# Patient Record
Sex: FEMALE | Race: WHITE | NOT HISPANIC OR LATINO | Employment: FULL TIME | URBAN - METROPOLITAN AREA
[De-identification: names, ages, dates, MRNs, and addresses within clinical notes are randomized per-mention and may not be internally consistent; named-entity substitution may affect disease eponyms.]

---

## 2017-03-21 ENCOUNTER — OFFICE VISIT (OUTPATIENT)
Dept: FAMILY MEDICINE | Facility: CLINIC | Age: 22
End: 2017-03-21
Payer: COMMERCIAL

## 2017-03-21 VITALS
TEMPERATURE: 97.5 F | SYSTOLIC BLOOD PRESSURE: 111 MMHG | WEIGHT: 135.2 LBS | OXYGEN SATURATION: 98 % | DIASTOLIC BLOOD PRESSURE: 72 MMHG | HEIGHT: 67 IN | HEART RATE: 85 BPM | BODY MASS INDEX: 21.22 KG/M2

## 2017-03-21 DIAGNOSIS — Z20.2 EXPOSURE TO CHLAMYDIA: ICD-10-CM

## 2017-03-21 DIAGNOSIS — Z11.3 SCREEN FOR STD (SEXUALLY TRANSMITTED DISEASE): Primary | ICD-10-CM

## 2017-03-21 LAB — HIV 1+2 AB+HIV1 P24 AG SERPL QL IA: NORMAL

## 2017-03-21 PROCEDURE — 99213 OFFICE O/P EST LOW 20 MIN: CPT | Performed by: PHYSICIAN ASSISTANT

## 2017-03-21 PROCEDURE — 87591 N.GONORRHOEAE DNA AMP PROB: CPT | Performed by: PHYSICIAN ASSISTANT

## 2017-03-21 PROCEDURE — 86780 TREPONEMA PALLIDUM: CPT | Performed by: PHYSICIAN ASSISTANT

## 2017-03-21 PROCEDURE — 36415 COLL VENOUS BLD VENIPUNCTURE: CPT | Performed by: PHYSICIAN ASSISTANT

## 2017-03-21 PROCEDURE — 87389 HIV-1 AG W/HIV-1&-2 AB AG IA: CPT | Performed by: PHYSICIAN ASSISTANT

## 2017-03-21 PROCEDURE — 87491 CHLMYD TRACH DNA AMP PROBE: CPT | Performed by: PHYSICIAN ASSISTANT

## 2017-03-21 NOTE — MR AVS SNAPSHOT
"              After Visit Summary   3/21/2017    Ary Lutz    MRN: [id number]           Patient Information     Date Of Birth          1995        Visit Information        Provider Department      3/21/2017 7:20 AM Yanick Baca PA-C [facility] [place]        Today's Diagnoses     Screen for STD (sexually transmitted disease)    -  1    Chlamydia contact           Follow-ups after your visit        Who to contact     If you have questions or need follow up information about today's clinic visit or your schedule please contact [facility] directly at [phone].  Normal or non-critical lab and imaging results will be communicated to you by [place], letter or phone within 4 business days after the clinic has received the results. If you do not hear from us within 7 days, please contact the clinic through [place] or phone. If you have a critical or abnormal lab result, we will notify you by phone as soon as possible.  Submit refill requests through So1 or call your pharmacy and they will forward the refill request to us. Please allow 3 business days for your refill to be completed.          Additional Information About Your Visit        MyChart Information     So1 lets you send messages to your doctor, view your test results, renew your prescriptions, schedule appointments and more. To sign up, go to [email or web address] . Click on \"Log in\" on the left side of the screen, which will take you to the Welcome page. Then click on \"Sign up Now\" on the right side of the page.     You will be asked to enter the access code listed below, as well as some personal information. Please follow the directions to create your username and password.     Your access code is: [email or web address]  Expires: 2017  7:49 AM     Your access code will  in 90 days. If you need help or a new code, please call your [facility] or [phone].        Care EveryWhere ID     " "This is your Care EveryWhere ID. This could be used by other organizations to access your [facility] medical records  [id number]        Your Vitals Were     Pulse Temperature Height Last Period Pulse Oximetry BMI (Body Mass Index)    85 97.5  F (36.4  C) (Tympanic) 5' 7\" (1.702 m) 03/21/2017 98% 21.18 kg/m2       Blood Pressure from Last 3 Encounters:   03/21/17 111/72   12/22/16 (!) 86/60    Weight from Last 3 Encounters:   03/21/17 135 lb 3.2 oz (61.3 kg)   12/22/16 124 lb (56.2 kg)              We Performed the Following     Anti Treponema     Chlamydia trachomatis PCR     HIV Antigen Antibody Combo     Neisseria gonorrhoeae PCR        Primary Care Provider    None Specified       No primary provider on file.        Thank you!     [facility]. Our goal is always to provide you with excellent care. Hearing back from our patients is one way we can continue to improve our services. Please take a few minutes to complete the written survey that you may receive in the mail after your visit with us. Thank you!             Your Updated Medication List - Protect others around you: Learn how to safely use, store and throw away your medicines at www.disposemymeds.org.          This list is accurate as of: 3/21/17  7:49 AM.  Always use your most recent med list.                   Brand Name Dispense Instructions for use    drospirenone-ethinyl estradiol 3-0.03 MG per tablet    BRANDON     1 tablet daily       FLUoxetine 40 MG capsule    PROzac     Take 1 capsule (40 mg) by mouth daily       naproxen 500 MG tablet    NAPROSYN    30 tablet    Take 1 tablet (500 mg) by mouth 2 times daily as needed for moderate pain       pantoprazole 40 MG EC tablet    PROTONIX     TAKE 1 TABLET BY MOUTH EVERY MORNING BEFORE BREAKFAST.         "

## 2017-03-21 NOTE — NURSING NOTE
"Chief Complaint   Patient presents with     STD       Initial There were no vitals taken for this visit. Estimated body mass index is 19.42 kg/(m^2) as calculated from the following:    Height as of 12/22/16: 5' 7\" (1.702 m).    Weight as of 12/22/16: 124 lb (56.2 kg).  Medication Reconciliation: complete   Albania Bedolla CMA    "

## 2017-03-21 NOTE — PROGRESS NOTES
SUBJECTIVE:                                                    Ary Lutz is a 21 year old female who presents to clinic today for the following health issues:      STD check   Patient was notified by partner 1 month ago that he tested positive for chlamydia.  [facility].  She is here today to be retested to ensure that chlamydia has been cured ad would like other STD testing.  She is asymptomatic at this time. No SA since last exposure.+ condom use          Problem list and histories reviewed & adjusted, as indicated.  Additional history: as documented    Patient Active Problem List   Diagnosis     SCOTT (generalized anxiety disorder)     History reviewed. No pertinent past surgical history.    Social History   Substance Use Topics     Smoking status: Never Smoker     Smokeless tobacco: Never Used     Alcohol use No     History reviewed. No pertinent family history.      Current Outpatient Prescriptions   Medication Sig Dispense Refill     drospirenone-ethinyl estradiol (BRANDON) 3-0.03 MG per tablet 1 tablet daily  3     pantoprazole (PROTONIX) 40 MG EC tablet TAKE 1 TABLET BY MOUTH EVERY MORNING BEFORE BREAKFAST.  0     FLUoxetine (PROZAC) 40 MG capsule Take 1 capsule (40 mg) by mouth daily       naproxen (NAPROSYN) 500 MG tablet Take 1 tablet (500 mg) by mouth 2 times daily as needed for moderate pain (Patient not taking: Reported on 3/21/2017) 30 tablet 1     Allergies   Allergen Reactions     Bactrim [Sulfamethoxazole W/Trimethoprim] Rash       Reviewed and updated as needed this visit by clinical staff       Reviewed and updated as needed this visit by Provider         ROS:  Constitutional, HEENT, cardiovascular, pulmonary, gi and gu systems are negative, except as otherwise noted.    OBJECTIVE:                                                    /72 (BP Location: Right arm, Patient Position: Chair, Cuff Size:  "Adult Regular)  Pulse 85  Temp 97.5  F (36.4  C) (Tympanic)  Ht 5' 7\" (1.702 m)  Wt 135 lb 3.2 oz (61.3 kg)  LMP 03/21/2017  SpO2 98%  BMI 21.18 kg/m2  Body mass index is 21.18 kg/(m^2).  GENERAL: healthy, alert and no distress  RESP: lungs clear to auscultation - no rales, rhonchi or wheezes  CV: regular rate and rhythm, normal S1 S2, no S3 or S4, no murmur  ABDOMEN: soft, nontender, no hepatosplenomegaly, no masses and bowel sounds normal    Diagnostic Test Results:  none      ASSESSMENT/PLAN:                                                      1. Exposure to chlamydia  Already treated.  Will retest today and treat accordingly if positive.  She is agreeable to this plan of care    2. Screen for STD (sexually transmitted disease)  - Neisseria gonorrhoeae PCR  - Chlamydia trachomatis PCR  - HIV Antigen Antibody Combo  - Anti Treponema    See Patient Instructions    Yanick Baca PA-C  [facility]  "

## 2017-03-22 LAB
N GONORRHOEA DNA SPEC QL NAA+PROBE: NORMAL
SPECIMEN SOURCE: NORMAL
T PALLIDUM IGG+IGM SER QL: NEGATIVE

## 2017-03-23 LAB
C TRACH DNA SPEC QL NAA+PROBE: ABNORMAL
SPECIMEN SOURCE: ABNORMAL

## 2017-03-24 ENCOUNTER — TELEPHONE (OUTPATIENT)
Dept: FAMILY MEDICINE | Facility: CLINIC | Age: 22
End: 2017-03-24

## 2017-03-24 DIAGNOSIS — A74.9 CHLAMYDIA INFECTION: Primary | ICD-10-CM

## 2017-03-24 RX ORDER — DOXYCYCLINE 100 MG/1
100 CAPSULE ORAL 2 TIMES DAILY
Qty: 14 CAPSULE | Refills: 0 | Status: SHIPPED | OUTPATIENT
Start: 2017-03-24 | End: 2017-03-31

## 2017-03-24 NOTE — TELEPHONE ENCOUNTER
Attempted to call patient regarding her results showing positive chlamydia test. She was treated 1 month ago for this, so I believe that we should  try a different antibiotic. I have called in doxycycline BID x 7 days to the pharmacy.  Please call patient and inform her.  Let me know if she has questions.  Please begin [facility] paperwork.  She should follow up for retesting 3 weeks after last dose of meds.  She should also inform sex partners

## 2017-03-24 NOTE — TELEPHONE ENCOUNTER
Patient called back and was given result below per Yanick Baca PA-C. Patient agrees to  doxycyline. Paperwork for [facility] completed and faxed. Shae Johnston RN

## 2017-04-14 ENCOUNTER — OFFICE VISIT (OUTPATIENT)
Dept: FAMILY MEDICINE | Facility: CLINIC | Age: 22
End: 2017-04-14
Payer: COMMERCIAL

## 2017-04-14 VITALS
BODY MASS INDEX: 20.25 KG/M2 | WEIGHT: 129 LBS | SYSTOLIC BLOOD PRESSURE: 100 MMHG | RESPIRATION RATE: 16 BRPM | TEMPERATURE: 98.6 F | HEIGHT: 67 IN | OXYGEN SATURATION: 99 % | HEART RATE: 74 BPM | DIASTOLIC BLOOD PRESSURE: 60 MMHG

## 2017-04-14 DIAGNOSIS — Z86.19 HX OF CHLAMYDIA INFECTION: ICD-10-CM

## 2017-04-14 DIAGNOSIS — K21.9 GASTROESOPHAGEAL REFLUX DISEASE WITHOUT ESOPHAGITIS: Primary | ICD-10-CM

## 2017-04-14 PROCEDURE — 99213 OFFICE O/P EST LOW 20 MIN: CPT | Performed by: PHYSICIAN ASSISTANT

## 2017-04-14 PROCEDURE — 87491 CHLMYD TRACH DNA AMP PROBE: CPT | Performed by: PHYSICIAN ASSISTANT

## 2017-04-14 NOTE — MR AVS SNAPSHOT
"              After Visit Summary   4/14/2017    Ary Lutz    MRN: [id number]           Patient Information     Date Of Birth          1995        Visit Information        Provider Department      4/14/2017 7:00 AM Yanick Baca PA-C [facility] [place]        Today's Diagnoses     Gastroesophageal reflux disease without esophagitis    -  1    Hx of chlamydia infection           Follow-ups after your visit        Future tests that were ordered for you today     Open Future Orders        Priority Expected Expires Ordered    H Pylori antigen, stool Routine  5/14/2017 4/14/2017            Who to contact     If you have questions or need follow up information about today's clinic visit or your schedule please contact [facility] directly at [phone].  Normal or non-critical lab and imaging results will be communicated to you by MyChart, letter or phone within 4 business days after the clinic has received the results. If you do not hear from us within 7 days, please contact the clinic through eSiliconhart or phone. If you have a critical or abnormal lab result, we will notify you by phone as soon as possible.  Submit refill requests through X2TV or call your pharmacy and they will forward the refill request to us. Please allow 3 business days for your refill to be completed.          Additional Information About Your Visit        MyChart Information     X2TV lets you send messages to your doctor, view your test results, renew your prescriptions, schedule appointments and more. To sign up, go to [email or web address] . Click on \"Log in\" on the left side of the screen, which will take you to the Welcome page. Then click on \"Sign up Now\" on the right side of the page.     You will be asked to enter the access code listed below, as well as some personal information. Please follow the directions to create your username and password.     Your access code is: " "[email or web address]  Expires: 2017  7:49 AM     Your access code will  in 90 days. If you need help or a new code, please call your [facility] clinic or [phone].        Care EveryWhere ID     This is your Care EveryWhere ID. This could be used by other organizations to access your [facility] medical records  [id number]        Your Vitals Were     Pulse Temperature Respirations Height Last Period Pulse Oximetry    74 98.6  F (37  C) 16 5' 7\" (1.702 m) 2017 99%    BMI (Body Mass Index)                   20.2 kg/m2            Blood Pressure from Last 3 Encounters:   17 100/60   17 111/72   16 (!) 86/60    Weight from Last 3 Encounters:   17 129 lb (58.5 kg)   17 135 lb 3.2 oz (61.3 kg)   16 124 lb (56.2 kg)              We Performed the Following     Chlamydia trachomatis PCR        Primary Care Provider Office Phone # Fax #    Yanick Baca PA-C [phone]       [facility] [place] [address] [place] [address] [address]  [place] [address]        Thank you!     Thank you for choosing [facility]  for your care. Our goal is always to provide you with excellent care. Hearing back from our patients is one way we can continue to improve our services. Please take a few minutes to complete the written survey that you may receive in the mail after your visit with us. Thank you!             Your Updated Medication List - Protect others around you: Learn how to safely use, store and throw away your medicines at www.disposemymeds.org.          This list is accurate as of: 17  7:54 AM.  Always use your most recent med list.                   Brand Name Dispense Instructions for use    drospirenone-ethinyl estradiol 3-0.03 MG per tablet    BRANDON     1 tablet daily       FLUoxetine 40 MG capsule    PROzac     Take 1 capsule (40 mg) by mouth daily       pantoprazole 40 MG EC tablet    PROTONIX     TAKE 1 TABLET BY MOUTH EVERY MORNING BEFORE " BREAKFAST.

## 2017-04-14 NOTE — LETTER
[facility]          [address] [place] [address]                            [phone]  Fax: [phone]  April 18, 2017     Ary Lutz  [address]   [facility] [place] [address]      Dear Ary,    The results of your recent tests were reviewed and are as follows:    Your repeat chlamydia test is negative at this time.  No further treatment is needed      Enclosed is a copy of the results.     Results for orders placed or performed in visit on 04/14/17   Chlamydia trachomatis PCR   Result Value Ref Range    Specimen Description Urine     Chlamydia Trachomatis PCR  NEG     Negative   Negative for C. trachomatis rRNA by transcription mediated amplification.   A negative result by transcription mediated amplification does not preclude the   presence of C. trachomatis infection because results are dependent on proper   and adequate collection, absence of inhibitors, and sufficient rRNA to be   detected.         Thank you for choosing [facility].  We appreciate the opportunity to serve you and look forward to supporting your healthcare needs in the future.    If you have any questions or concerns, please call me or my staff at [phone].      Sincerely,      Yanick Baca PA-C

## 2017-04-14 NOTE — PROGRESS NOTES
"Chief Complaint   Patient presents with     Abdominal Pain       Initial /60  Pulse 74  Temp 98.6  F (37  C)  Resp 16  Ht 5' 7\" (1.702 m)  Wt 129 lb (58.5 kg)  LMP 03/21/2017  SpO2 99%  BMI 20.2 kg/m2 Estimated body mass index is 20.2 kg/(m^2) as calculated from the following:    Height as of this encounter: 5' 7\" (1.702 m).    Weight as of this encounter: 129 lb (58.5 kg).  Medication Reconciliation: complete. SHAUNA Duran LPN        SUBJECTIVE:                                                    Ary Lutz is a 21 year old female who presents to clinic today for the following health issues:      ABDOMINAL PAIN     Onset: 3 weeks    Description:   Character: Cramping  Location: middle - changes  Radiation: None    Intensity: 7/10    Progression of Symptoms:  worsening    Accompanying Signs & Symptoms:  Fever/Chills?: no   Gas/Bloating: YES  Nausea: YES- sometimes  Vomitting: no   Diarrhea?: YES  Constipation:no   Dysuria or Hematuria: no    History:   Trauma: no   Previous similar pain: no    Previous tests done: none    Precipitating factors:   Does the pain change with:     Food: YES- better for a short while     BM: no     Urination: no     Alleviating factors:  none    Therapies Tried and outcome: none    LMP:  3/20/17           Ary has a hx of GERD for which she takes Protonix 40 mg daily.   She had an endoscopy in 2014 which showed H pylori and she was treated.  Over the past 3 weeks she has been having nausea and an \"empty gnawing\" feeling her her epigastrium that is constant and is worse with eating.  She notes occasional loose stools as well.  No melena, or hematochezia.    Patient also was treated for chlamydia 3 weeks ago, she is asymptomatic and would like to be tested for cure at this time.    Problem list and histories reviewed & adjusted, as indicated.  Additional history: as documented    Patient Active Problem List   Diagnosis     SCOTT (generalized anxiety disorder)     No " "past surgical history on file.    Social History   Substance Use Topics     Smoking status: Never Smoker     Smokeless tobacco: Never Used     Alcohol use No     Family History   Problem Relation Age of Onset     Thyroid Cancer Mother      Familial Adenomatous Polyposis (FAP) Father          Current Outpatient Prescriptions   Medication Sig Dispense Refill     drospirenone-ethinyl estradiol (BRANDON) 3-0.03 MG per tablet 1 tablet daily  3     pantoprazole (PROTONIX) 40 MG EC tablet TAKE 1 TABLET BY MOUTH EVERY MORNING BEFORE BREAKFAST.  0     FLUoxetine (PROZAC) 40 MG capsule Take 1 capsule (40 mg) by mouth daily       Allergies   Allergen Reactions     Bactrim [Sulfamethoxazole W/Trimethoprim] Rash       Reviewed and updated as needed this visit by clinical staff  Tobacco  Allergies  Meds  Fam Hx  Soc Hx      Reviewed and updated as needed this visit by Provider         ROS:  Constitutional, HEENT, cardiovascular, pulmonary, gi and gu systems are negative, except as otherwise noted.    OBJECTIVE:                                                    /60  Pulse 74  Temp 98.6  F (37  C)  Resp 16  Ht 5' 7\" (1.702 m)  Wt 129 lb (58.5 kg)  LMP 03/21/2017  SpO2 99%  BMI 20.2 kg/m2  Body mass index is 20.2 kg/(m^2).  GENERAL: healthy, alert and no distress  RESP: lungs clear to auscultation - no rales, rhonchi or wheezes  CV: regular rate and rhythm, normal S1 S2, no S3 or S4, no murmur  ABDOMEN: soft, generalized epigastric tenderness, no rebound or guarding, no hepatosplenomegaly, no masses and bowel sounds normal  PSYCH: mentation appears normal, affect normal/bright    Diagnostic Test Results:  none      ASSESSMENT/PLAN:                                                      1. Gastroesophageal reflux disease without esophagitis  Advise that patient start zantac daily with the use of Protonix x 2-3 weeks. And we will check for H pylori infection at this time.  If no improvement in 3 weeks will send to GI " or endoscopy for further assessment  - H Pylori antigen, stool; Future    2. Hx of chlamydia infection  - Chlamydia trachomatis PCR    Follow-Up:As above    Yanick Baca PA-C  [facility] [place]

## 2017-04-18 LAB
C TRACH DNA SPEC QL NAA+PROBE: NORMAL
SPECIMEN SOURCE: NORMAL

## 2017-07-31 ENCOUNTER — OFFICE VISIT (OUTPATIENT)
Dept: FAMILY MEDICINE | Facility: CLINIC | Age: 22
End: 2017-07-31
Payer: COMMERCIAL

## 2017-07-31 VITALS
HEIGHT: 67 IN | TEMPERATURE: 96.7 F | HEART RATE: 83 BPM | BODY MASS INDEX: 20.81 KG/M2 | SYSTOLIC BLOOD PRESSURE: 104 MMHG | OXYGEN SATURATION: 100 % | RESPIRATION RATE: 16 BRPM | DIASTOLIC BLOOD PRESSURE: 72 MMHG | WEIGHT: 132.6 LBS

## 2017-07-31 DIAGNOSIS — J01.90 ACUTE SINUSITIS WITH SYMPTOMS > 10 DAYS: ICD-10-CM

## 2017-07-31 DIAGNOSIS — R35.0 URINARY FREQUENCY: ICD-10-CM

## 2017-07-31 DIAGNOSIS — K21.9 GASTROESOPHAGEAL REFLUX DISEASE WITHOUT ESOPHAGITIS: Primary | ICD-10-CM

## 2017-07-31 LAB
ALBUMIN UR-MCNC: NEGATIVE MG/DL
APPEARANCE UR: CLEAR
BACTERIA #/AREA URNS HPF: ABNORMAL /HPF
BILIRUB UR QL STRIP: NEGATIVE
COLOR UR AUTO: YELLOW
GLUCOSE UR STRIP-MCNC: NEGATIVE MG/DL
HGB UR QL STRIP: ABNORMAL
KETONES UR STRIP-MCNC: NEGATIVE MG/DL
LEUKOCYTE ESTERASE UR QL STRIP: NEGATIVE
MUCOUS THREADS #/AREA URNS LPF: PRESENT /LPF
NITRATE UR QL: NEGATIVE
NON-SQ EPI CELLS #/AREA URNS LPF: ABNORMAL /LPF
PH UR STRIP: 6 PH (ref 5–7)
RBC #/AREA URNS AUTO: ABNORMAL /HPF (ref 0–2)
SP GR UR STRIP: <=1.005 (ref 1–1.03)
URN SPEC COLLECT METH UR: ABNORMAL
UROBILINOGEN UR STRIP-ACNC: 0.2 EU/DL (ref 0.2–1)
WBC #/AREA URNS AUTO: ABNORMAL /HPF (ref 0–2)

## 2017-07-31 PROCEDURE — 99214 OFFICE O/P EST MOD 30 MIN: CPT | Performed by: PHYSICIAN ASSISTANT

## 2017-07-31 PROCEDURE — 81001 URINALYSIS AUTO W/SCOPE: CPT | Performed by: PHYSICIAN ASSISTANT

## 2017-07-31 PROCEDURE — 87086 URINE CULTURE/COLONY COUNT: CPT | Performed by: PHYSICIAN ASSISTANT

## 2017-07-31 NOTE — PROGRESS NOTES
SUBJECTIVE:                                                    Ary Lutz is a 22 year old female who presents to clinic today for the following health issues:      Acute Illness   Acute illness concerns: Sinus concerns  Onset: 4 months    Fever: YES- sometimes, none recently    Chills/Sweats: YES    Headache (location?): YES    Sinus Pressure:YES, R>L    Conjunctivitis:  no    Ear Pain: YES- both    Rhinorrhea: YES    Congestion: YES  Sore Throat: YES- scatchy   Cough: YES    Wheeze: no     Decreased Appetite: no     Nausea: YES    Vomiting: no     Diarrhea:  YES    Dysuria/Freq.: YES- frequent    Fatigue/Achiness: YES- both    Sick/Strep Exposure: no      Therapies Tried and outcome: OTC medication decongestant - helps a little.    Sinus pressure:  4 month hx of facial pressure, dental pain and headaches R>L.  She notes associated nasal congestion, and sore throat, some sneezing and itchy watery eyes.  She was using afrin  Which did not help.  Has not tried any other medications.      GERD; See last note, using Protonix 40 mg without improvement, GERD seems to be worsening despite this.  Has tried omeprazole and zantac without improvement     Urinary frequency:  X 2-3 weeks, no urgency, dysuria or hematuria.  No n/v/d.        Problem list and histories reviewed & adjusted, as indicated.  Additional history: as documented    Patient Active Problem List   Diagnosis     SCOTT (generalized anxiety disorder)     No past surgical history on file.    Social History   Substance Use Topics     Smoking status: Never Smoker     Smokeless tobacco: Never Used     Alcohol use No     Family History   Problem Relation Age of Onset     Thyroid Cancer Mother      Familial Adenomatous Polyposis (FAP) Father          Current Outpatient Prescriptions   Medication Sig Dispense Refill     amoxicillin-clavulanate (AUGMENTIN) 875-125 MG per tablet Take 1 tablet by mouth 2 times daily 20 tablet 0     drospirenone-ethinyl estradiol  "(BRANDON) 3-0.03 MG per tablet 1 tablet daily  3     pantoprazole (PROTONIX) 40 MG EC tablet TAKE 1 TABLET BY MOUTH EVERY MORNING BEFORE BREAKFAST.  0     FLUoxetine (PROZAC) 40 MG capsule Take 1 capsule (40 mg) by mouth daily       Allergies   Allergen Reactions     Bactrim [Sulfamethoxazole W/Trimethoprim] Rash         Reviewed and updated as needed this visit by clinical staff     Reviewed and updated as needed this visit by Provider         ROS:  Constitutional, HEENT, cardiovascular, pulmonary, GI, , musculoskeletal, neuro, skin, endocrine and psych systems are negative, except as otherwise noted.      OBJECTIVE:   /72 (BP Location: Left arm, Patient Position: Chair, Cuff Size: Adult Regular)  Pulse 83  Temp 96.7  F (35.9  C) (Tympanic)  Resp 16  Ht 5' 7\" (1.702 m)  Wt 132 lb 9.6 oz (60.1 kg)  LMP 07/04/2017 (Approximate)  SpO2 100%  BMI 20.77 kg/m2  Body mass index is 20.77 kg/(m^2).  GENERAL: healthy, alert and no distress  RESP: lungs clear to auscultation - no rales, rhonchi or wheezes  HENT:  TTP along right maxillary and frontal sinus, nasal turbinates swollen bilaterally, tonsils without exudates  CV: regular rate and rhythm, normal S1 S2, no S3 or S4, no murmur, click or rub  ABDOMEN: soft, nontender, no hepatosplenomegaly, no masses and bowel sounds normal  PSYCH: mentation appears normal, affect normal/bright    Diagnostic Test Results:  none     ASSESSMENT/PLAN:       1. Gastroesophageal reflux disease without esophagitis  Will test for h pylori, if negative, consider EGD.  - H Pylori antigen, stool; Future  - Urine Microscopic    2. Acute sinusitis with symptoms > 10 days  Symptoms and exam consistent with sinusitis. Will try round of antibiotics and flonase over the next 2 weeks, if no improvement, may consider steroid burst  - amoxicillin-clavulanate (AUGMENTIN) 875-125 MG per tablet; Take 1 tablet by mouth 2 times daily  Dispense: 20 tablet; Refill: 0    3. Urinary frequency  Will " await culture for treatment  - UA reflex to Microscopic and Culture  - Urine Culture Aerobic Bacterial    See Patient Instructions    Yanick Baca PA-C  [facility] [place]

## 2017-07-31 NOTE — MR AVS SNAPSHOT
After Visit Summary   7/31/2017    Ary Lutz    MRN: [id number]           Patient Information     Date Of Birth          1995        Visit Information        Provider Department      7/31/2017 7:00 AM Yanick Baca PA-C [facility] [place]        Today's Diagnoses     Gastroesophageal reflux disease without esophagitis    -  1    Acute sinusitis with symptoms > 10 days        Urinary frequency           Follow-ups after your visit        Future tests that were ordered for you today     Open Future Orders        Priority Expected Expires Ordered    H Pylori antigen, stool Routine  8/30/2017 7/31/2017            Who to contact     If you have questions or need follow up information about today's clinic visit or your schedule please [facility] directly at [phone].  Normal or non-critical lab and imaging results will be communicated to you by MyChart, letter or phone within 4 business days after the clinic has received the results. If you do not hear from us within 7 days, please contact the clinic through MyChart or phone. If you have a critical or abnormal lab result, we will notify you by phone as soon as possible.  Submit refill requests through [employer] or call your pharmacy and they will forward the refill request to us. Please allow 3 business days for your refill to be completed.          Additional Information About Your Visit        MyChart Information     [employer] gives you secure access to your electronic health record. If you see a primary care provider, you can also send messages to your care team and make appointments. If you have questions, please call your primary care clinic.  If you do not have a primary care provider, please call [phone] and they will assist you.        Care EveryWhere ID     This is your Care EveryWhere ID. This could be used by other organizations to access your [facility]  "records  [id number]        Your Vitals Were     Pulse Temperature Respirations Height Last Period Pulse Oximetry    83 96.7  F (35.9  C) (Tympanic) 16 5' 7\" (1.702 m) 07/04/2017 (Approximate) 100%    BMI (Body Mass Index)                   20.77 kg/m2            Blood Pressure from Last 3 Encounters:   07/31/17 104/72   04/14/17 100/60   03/21/17 111/72    Weight from Last 3 Encounters:   07/31/17 132 lb 9.6 oz (60.1 kg)   04/14/17 129 lb (58.5 kg)   03/21/17 135 lb 3.2 oz (61.3 kg)              We Performed the Following     UA reflex to Microscopic and Culture     Urine Culture Aerobic Bacterial          Today's Medication Changes          These changes are accurate as of: 7/31/17  7:26 AM.  If you have any questions, ask your nurse or doctor.               Start taking these medicines.        Dose/Directions    amoxicillin-clavulanate 875-125 MG per tablet   Commonly known as:  AUGMENTIN   Used for:  Acute sinusitis with symptoms > 10 days   Started by:  Yanick Baca PA-C        Dose:  1 tablet   Take 1 tablet by mouth 2 times daily   Quantity:  20 tablet   Refills:  0            Where to get your medicines      These medications were sent to [place] [address] [facility] [place] [address]  [address] [address]     Phone:  [phone]     amoxicillin-clavulanate 875-125 MG per tablet                Primary Care Provider Office Phone # Fax #    Yanick Baca PA-C [phone]       [facility] [address]  [place] [address]        Equal Access to Services     [facility] AH: Hadii gill jackson Socira, waaxda luqadaha, qaybta kaalmada adeegyada, hollis helms. So [facility] [phone].    ATENCIÓN: Si habla español, tiene a spangler disposición servicios gratuitos de asistencia lingüística. Llame al [phone].    We comply with applicable federal civil rights laws and [place] " laws. We do not discriminate on the basis of race, color, national origin, age, disability sex, sexual orientation or gender identity.            Thank you!     Thank you for choosing [facility] [place]  for your care. Our goal is always to provide you with excellent care. Hearing back from our patients is one way we can continue to improve our services. Please take a few minutes to complete the written survey that you may receive in the mail after your visit with us. Thank you!             Your Updated Medication List - Protect others around you: Learn how to safely use, store and throw away your medicines at www.disposemymeds.org.          This list is accurate as of: 7/31/17  7:26 AM.  Always use your most recent med list.                   Brand Name Dispense Instructions for use Diagnosis    amoxicillin-clavulanate 875-125 MG per tablet    AUGMENTIN    20 tablet    Take 1 tablet by mouth 2 times daily    Acute sinusitis with symptoms > 10 days       drospirenone-ethinyl estradiol 3-0.03 MG per tablet    BRANDON     1 tablet daily        FLUoxetine 40 MG capsule    PROzac     Take 1 capsule (40 mg) by mouth daily        pantoprazole 40 MG EC tablet    PROTONIX     TAKE 1 TABLET BY MOUTH EVERY MORNING BEFORE BREAKFAST.

## 2017-08-01 LAB
BACTERIA SPEC CULT: NORMAL
MICRO REPORT STATUS: NORMAL
SPECIMEN SOURCE: NORMAL

## 2017-08-01 NOTE — PROGRESS NOTES
Ary-  Here are your recent results.     Your urine culture grew out normal anushka and no antibiotic is needed at this time.      If you have any questions please do not hesitate to contact our office via phone ([phone]) or you may send me a message via OpenPortal by clicking the contact my Care Team link.      It was a pleasure participating in your care!    Thank you,    Yanick Baca MPH, PA-C  [address] [place] [address] [phone]

## 2017-08-02 DIAGNOSIS — K21.9 GASTROESOPHAGEAL REFLUX DISEASE WITHOUT ESOPHAGITIS: ICD-10-CM

## 2017-08-02 PROCEDURE — 87338 HPYLORI STOOL AG IA: CPT | Performed by: PHYSICIAN ASSISTANT

## 2017-08-03 ENCOUNTER — TELEPHONE (OUTPATIENT)
Dept: FAMILY MEDICINE | Facility: CLINIC | Age: 22
End: 2017-08-03

## 2017-08-03 DIAGNOSIS — K21.9 GASTROESOPHAGEAL REFLUX DISEASE WITHOUT ESOPHAGITIS: Primary | ICD-10-CM

## 2017-08-03 LAB
H PYLORI AG STL QL IA: NORMAL
MICRO REPORT STATUS: NORMAL
SPECIMEN SOURCE: NORMAL

## 2017-08-03 NOTE — TELEPHONE ENCOUNTER
Please call Ary and inform her that her h pylori test is negative .  Given her persistent GERD symptoms I would like her to have an endoscopy at this time.  This test has been ordered.  Please assist her with the GI information

## 2017-08-03 NOTE — TELEPHONE ENCOUNTER
Attempted to call patient, no answer or voicemail. Will try later.   Inga Flower RN   [facility] - Triage

## 2017-08-04 NOTE — TELEPHONE ENCOUNTER
Unable to leave message.  Mailbox full.  I mycharted this information to the patient  Faviola Lea RN- Triage [employer]

## 2017-09-06 DIAGNOSIS — F41.1 GAD (GENERALIZED ANXIETY DISORDER): ICD-10-CM

## 2017-09-06 NOTE — TELEPHONE ENCOUNTER
fluoxetine     Last Written Prescription Date: 8/9/17  Last Fill Quantity: 30, # refills: 0  Last Office Visit with [facility] primary care provider:  7/31/17        Last PHQ-9 score on record=   PHQ-9 SCORE 8/9/2017   Total Score MyChart 2 (Minimal depression)   Total Score 2

## 2017-09-07 ENCOUNTER — SURGERY (OUTPATIENT)
Age: 22
End: 2017-09-07

## 2017-09-07 ENCOUNTER — HOSPITAL ENCOUNTER (OUTPATIENT)
Facility: CLINIC | Age: 22
Discharge: HOME | End: 2017-09-07
Attending: INTERNAL MEDICINE | Admitting: INTERNAL MEDICINE
Payer: COMMERCIAL

## 2017-09-07 VITALS
BODY MASS INDEX: 20.4 KG/M2 | WEIGHT: 130 LBS | OXYGEN SATURATION: 95 % | SYSTOLIC BLOOD PRESSURE: 125 MMHG | DIASTOLIC BLOOD PRESSURE: 65 MMHG | RESPIRATION RATE: 14 BRPM | HEIGHT: 67 IN

## 2017-09-07 LAB — UPPER GI ENDOSCOPY: NORMAL

## 2017-09-07 PROCEDURE — 25000128 H RX IP 250 OP 636: Performed by: INTERNAL MEDICINE

## 2017-09-07 PROCEDURE — G0500 MOD SEDAT ENDO SERVICE >5YRS: HCPCS | Performed by: INTERNAL MEDICINE

## 2017-09-07 PROCEDURE — 88305 TISSUE EXAM BY PATHOLOGIST: CPT | Performed by: INTERNAL MEDICINE

## 2017-09-07 PROCEDURE — 43239 EGD BIOPSY SINGLE/MULTIPLE: CPT | Performed by: INTERNAL MEDICINE

## 2017-09-07 PROCEDURE — 88305 TISSUE EXAM BY PATHOLOGIST: CPT | Mod: 26 | Performed by: INTERNAL MEDICINE

## 2017-09-07 RX ORDER — FLUOXETINE 40 MG/1
CAPSULE ORAL
Qty: 90 CAPSULE | Refills: 0 | Status: SHIPPED | OUTPATIENT
Start: 2017-09-07 | End: 2017-12-06

## 2017-09-07 RX ORDER — FENTANYL CITRATE 50 UG/ML
INJECTION, SOLUTION INTRAMUSCULAR; INTRAVENOUS PRN
Status: DISCONTINUED | OUTPATIENT
Start: 2017-09-07 | End: 2017-09-07 | Stop reason: HOSPADM

## 2017-09-07 RX ORDER — LIDOCAINE 40 MG/G
CREAM TOPICAL
Status: DISCONTINUED | OUTPATIENT
Start: 2017-09-07 | End: 2017-09-07 | Stop reason: HOSPADM

## 2017-09-07 RX ORDER — ONDANSETRON 2 MG/ML
4 INJECTION INTRAMUSCULAR; INTRAVENOUS
Status: DISCONTINUED | OUTPATIENT
Start: 2017-09-07 | End: 2017-09-07 | Stop reason: HOSPADM

## 2017-09-07 RX ADMIN — FENTANYL CITRATE 100 MCG: 50 INJECTION, SOLUTION INTRAMUSCULAR; INTRAVENOUS at 09:58

## 2017-09-07 RX ADMIN — MIDAZOLAM HYDROCHLORIDE 2 MG: 1 INJECTION, SOLUTION INTRAMUSCULAR; INTRAVENOUS at 10:05

## 2017-09-07 RX ADMIN — MIDAZOLAM HYDROCHLORIDE 2 MG: 1 INJECTION, SOLUTION INTRAMUSCULAR; INTRAVENOUS at 10:01

## 2017-09-07 RX ADMIN — FENTANYL CITRATE 50 MCG: 50 INJECTION, SOLUTION INTRAMUSCULAR; INTRAVENOUS at 10:16

## 2017-09-07 RX ADMIN — MIDAZOLAM HYDROCHLORIDE 2 MG: 1 INJECTION, SOLUTION INTRAMUSCULAR; INTRAVENOUS at 09:57

## 2017-09-07 NOTE — TELEPHONE ENCOUNTER
Prescription approved per [facility], [facility] or [facility] refill protocol.  Lissa Carroll RN - Triage  [facility]

## 2017-09-08 LAB — COPATH REPORT: NORMAL

## 2017-09-12 PROBLEM — K21.9 ESOPHAGEAL REFLUX: Status: ACTIVE | Noted: 2017-09-12

## 2017-09-15 ENCOUNTER — TELEPHONE (OUTPATIENT)
Dept: FAMILY MEDICINE | Facility: CLINIC | Age: 22
End: 2017-09-15

## 2017-09-15 NOTE — TELEPHONE ENCOUNTER
Left non-detailed message to call back and ask to speak with a triage nurse at [phone].  Shae Johnston RN

## 2017-09-15 NOTE — TELEPHONE ENCOUNTER
Patient is confirmed    [facility] by the patient.  Routing to Triage to see if this requires a phone call      Georgia LEVY

## 2017-09-18 ENCOUNTER — OFFICE VISIT (OUTPATIENT)
Dept: FAMILY MEDICINE | Facility: CLINIC | Age: 22
End: 2017-09-18
Payer: COMMERCIAL

## 2017-09-18 VITALS
DIASTOLIC BLOOD PRESSURE: 60 MMHG | BODY MASS INDEX: 21.14 KG/M2 | OXYGEN SATURATION: 99 % | HEART RATE: 80 BPM | SYSTOLIC BLOOD PRESSURE: 98 MMHG | TEMPERATURE: 98.2 F | WEIGHT: 135 LBS

## 2017-09-18 DIAGNOSIS — K21.9 GASTROESOPHAGEAL REFLUX DISEASE WITHOUT ESOPHAGITIS: Primary | ICD-10-CM

## 2017-09-18 DIAGNOSIS — R01.1 UNDIAGNOSED CARDIAC MURMURS: ICD-10-CM

## 2017-09-18 PROCEDURE — 99213 OFFICE O/P EST LOW 20 MIN: CPT | Performed by: PHYSICIAN ASSISTANT

## 2017-09-18 NOTE — PROGRESS NOTES
SUBJECTIVE:   Ary Lutz is a 22 year old female who presents to clinic today for the following health issues:      GERD/Heartburn  Onset: Pt had an endoscopy and the H. Pylori came back negative, she is here to f/u     Ary continued to experience a pain and burning in her chest despite Protonix.  Recently underwent endoscopy which was normal. She is wondering about next steps.  Today, she notes that sh has started to have more left sided chest wall pain after eating , which is change in her usual symptoms.  She also notes that her pain is becoming more persistent.  It occurs mainly after eating.  Pain is not exertional.  She does associate the pain with some intermittent palpations and some SOB that she attributes to anxiety when she starts to feel sick.  No dizziness, + nausea.  She has recent normal EKG done at [facility] 07/22/2017, this was reviewed on care everywhere today. No hx of cardiac issues.        Problem list and histories reviewed & adjusted, as indicated.  Additional history: as documented    Patient Active Problem List   Diagnosis     SCOTT (generalized anxiety disorder)     Esophageal reflux     Past Surgical History:   Procedure Laterality Date     ESOPHAGOSCOPY, GASTROSCOPY, DUODENOSCOPY (EGD), COMBINED N/A 9/7/2017    Procedure: COMBINED ESOPHAGOSCOPY, GASTROSCOPY, DUODENOSCOPY (EGD), BIOPSY SINGLE OR MULTIPLE;  gastroscopy;  Surgeon: Hollis Prather MD;  Location:  GI       Social History   Substance Use Topics     Smoking status: Never Smoker     Smokeless tobacco: Never Used     Alcohol use No     Family History   Problem Relation Age of Onset     Thyroid Cancer Mother      Familial Adenomatous Polyposis (FAP) Father          Current Outpatient Prescriptions   Medication Sig Dispense Refill     FLUoxetine (PROZAC) 40 MG capsule TAKE 1 CAPSULE (40 MG) BY MOUTH DAILY 90 capsule 0     drospirenone-ethinyl estradiol (BRANDON) 3-0.03 MG per tablet 1 tablet daily  3      pantoprazole (PROTONIX) 40 MG EC tablet TAKE 1 TABLET BY MOUTH EVERY MORNING BEFORE BREAKFAST.  0     Allergies   Allergen Reactions     Bactrim [Sulfamethoxazole W/Trimethoprim] Rash         Reviewed and updated as needed this visit by clinical staff     Reviewed and updated as needed this visit by Provider         ROS:  Constitutional, HEENT, cardiovascular, pulmonary, GI, , musculoskeletal, neuro, skin, endocrine and psych systems are negative, except as otherwise noted.      OBJECTIVE:   BP 98/60 (BP Location: Left arm, Patient Position: Chair, Cuff Size: Adult Regular)  Pulse 80  Temp 98.2  F (36.8  C) (Tympanic)  Wt 135 lb (61.2 kg)  LMP 08/21/2017  SpO2 99%  BMI 21.14 kg/m2  Body mass index is 21.14 kg/(m^2).  GENERAL: healthy, alert and no distress  RESP: lungs clear to auscultation - no rales, rhonchi or wheezes  CV: regular rate and rhythm, 2/6 systolic murmur more prominent at LSB, no click or rub  PSYCH: mentation appears normal, affect normal/bright    Diagnostic Test Results:  none     ASSESSMENT/PLAN:     1. Gastroesophageal reflux disease without esophagitis  Endoscopy normal despite no improvement of symptoms with Protonix.  On exam today, I do note a systolic murmur that was not noted previously.  This is likely not new, however given her worsening pain and associated symptoms, I would like to first r/o a cardiac cause prior to moving forward with further GI workup. She is agreeable.  Echo scheduled for thursday  - Echocardiogram Complete; Future    2. Undiagnosed cardiac murmurs  - Echocardiogram Complete; Future    See Patient Instructions    Yanick Baca PA-C  [facility]

## 2017-09-18 NOTE — TELEPHONE ENCOUNTER
Patient had scheduled appointment for Chest pains on 9/18/2017 however had cancelled appointment.  Message left for patient to return call if still experiencing symptoms to call and speak to triage nurse.  Lissa Carroll RN - Triage  [facility]

## 2017-09-18 NOTE — NURSING NOTE
"Chief Complaint   Patient presents with     Gastric Problem     RECHECK       Initial BP 98/60 (BP Location: Left arm, Patient Position: Chair, Cuff Size: Adult Regular)  Pulse 80  Temp 98.2  F (36.8  C) (Tympanic)  Wt 135 lb (61.2 kg)  LMP 08/21/2017  SpO2 99%  BMI 21.14 kg/m2 Estimated body mass index is 21.14 kg/(m^2) as calculated from the following:    Height as of 9/7/17: 5' 7\" (1.702 m).    Weight as of this encounter: 135 lb (61.2 kg).  Medication Reconciliation: complete  "

## 2017-09-18 NOTE — MR AVS SNAPSHOT
After Visit Summary   9/18/2017    Ary Lutz    MRN: [id number]           Patient Information     Date Of Birth          1995        Visit Information        Provider Department      9/18/2017 3:00 PM Yanick Baca PA-C [facility]        Today's Diagnoses     Gastroesophageal reflux disease without esophagitis    -  1    Undiagnosed cardiac murmurs           Follow-ups after your visit        Your next 10 appointments already scheduled     Sep 21, 2017  4:00 PM CDT   Ech Complete with ECECH1   [facility] ([facility])    [address] [place] [id number]   [phone]           1. Please bring or wear a comfortable two-piece outfit. 2. You may eat, drink and take your normal medicines. 3. For any questions that cannot be answered, please contact the ordering physician            Sep 22, 2017  7:00 AM CDT   MyChart Long with Yanick Baca PA-C   [facility] ([facility])    [address] [place] [id number]   [phone]              Future tests that were ordered for you today     Open Future Orders        Priority Expected Expires Ordered    Echocardiogram Complete Routine  9/18/2018 9/18/2017            Who to contact     If you have questions or need follow up information about today's clinic visit or your schedule please contact [facility] directly at [phone].  Normal or non-critical lab and imaging results will be communicated to you by MyChart, letter or phone within 4 business days after the clinic has received the results. If you do not hear from us within 7 days, please contact the clinic through [employer] or phone. If you have a critical or abnormal lab result, we will notify you by phone as soon as possible.  Submit refill requests through [employer] or call your pharmacy and they will forward the refill  request to us. Please allow 3 business days for your refill to be completed.          Additional Information About Your Visit        MyChart Information     [employer] gives you secure access to your electronic health record. If you see a primary care provider, you can also send messages to your care team and make appointments. If you have questions, please call your primary care clinic.  If you do not have a primary care provider, please call [phone] and they will assist you.        Care EveryWhere ID     This is your Care EveryWhere ID. This could be used by other organizations to access your [place] medical records  [id number]        Your Vitals Were     Pulse Temperature Last Period Pulse Oximetry BMI (Body Mass Index)       80 98.2  F (36.8  C) (Tympanic) 08/21/2017 99% 21.14 kg/m2        Blood Pressure from Last 3 Encounters:   09/18/17 98/60   09/07/17 125/65   07/31/17 104/72    Weight from Last 3 Encounters:   09/18/17 135 lb (61.2 kg)   09/07/17 130 lb (59 kg)   07/31/17 132 lb 9.6 oz (60.1 kg)               Primary Care Provider Office Phone # Fax #    Yanick Baca PA-C [phone]       [address]  [place] [address]        Equal Access to Services     [facility] AH: Hadii aad ku hadasho Soomaali, waaxda luqadaha, qaybta kaalmada adeegyada, waxay idiin hayaan shilpa vázquez la'aaalessandra . So [facility] [phone].    ATENCIÓN: Si habla español, tiene a spangler disposición servicios gratuitos de asistencia lingüística. Llame al [phone].    We comply with applicable federal civil rights laws and [place] laws. We do not discriminate on the basis of race, color, national origin, age, disability sex, sexual orientation or gender identity.            Thank you!     Thank you for choosing [facility] [place]  for your care. Our goal is always to provide you with excellent care. Hearing back from our patients is one way we can continue to improve our services. Please  take a few minutes to complete the written survey that you may receive in the mail after your visit with us. Thank you!             Your Updated Medication List - Protect others around you: Learn how to safely use, store and throw away your medicines at www.disposemymeds.org.          This list is accurate as of: 9/18/17  4:25 PM.  Always use your most recent med list.                   Brand Name Dispense Instructions for use Diagnosis    drospirenone-ethinyl estradiol 3-0.03 MG per tablet    BRANDON     1 tablet daily        FLUoxetine 40 MG capsule    PROzac    90 capsule    TAKE 1 CAPSULE (40 MG) BY MOUTH DAILY    SCOTT (generalized anxiety disorder)       pantoprazole 40 MG EC tablet    PROTONIX     TAKE 1 TABLET BY MOUTH EVERY MORNING BEFORE BREAKFAST.

## 2017-09-19 ENCOUNTER — MYC MEDICAL ADVICE (OUTPATIENT)
Dept: FAMILY MEDICINE | Facility: CLINIC | Age: 22
End: 2017-09-19

## 2017-09-19 NOTE — TELEPHONE ENCOUNTER
Please see My Chart message below and advise as appropriate.  Lissa Carroll RN - Triage  [facility]

## 2017-09-20 ENCOUNTER — TELEPHONE (OUTPATIENT)
Dept: FAMILY MEDICINE | Facility: CLINIC | Age: 22
End: 2017-09-20

## 2017-09-20 NOTE — TELEPHONE ENCOUNTER
Please call patient to see how she is feeling today and whether the her pain has improved, see mychart message

## 2017-09-20 NOTE — TELEPHONE ENCOUNTER
Left non detailed message for patient to return call.  Inga Flower RN   [facility] - Triage

## 2017-09-20 NOTE — TELEPHONE ENCOUNTER
Patient states that her pain is still a dull pain no increase or worsening in pain. States still continuous.  Echo is scheduled tomorrow.     Denies: SOB, sweats, light headed, dizziness, nausea, vomiting, radiating pain.     Education on any further worsening or additional symptoms to seek ER care and not wait for testing tomorrow.  Patient agreed with plan.  Lissa Carroll RN - Triage  [facility]

## 2017-09-21 ENCOUNTER — RADIANT APPOINTMENT (OUTPATIENT)
Dept: CARDIOLOGY | Facility: CLINIC | Age: 22
End: 2017-09-21
Attending: PHYSICIAN ASSISTANT
Payer: COMMERCIAL

## 2017-09-21 DIAGNOSIS — K21.9 GASTROESOPHAGEAL REFLUX DISEASE WITHOUT ESOPHAGITIS: ICD-10-CM

## 2017-09-21 DIAGNOSIS — R01.1 UNDIAGNOSED CARDIAC MURMURS: ICD-10-CM

## 2017-09-21 PROCEDURE — 93306 TTE W/DOPPLER COMPLETE: CPT | Performed by: INTERNAL MEDICINE

## 2017-09-22 ENCOUNTER — MYC MEDICAL ADVICE (OUTPATIENT)
Dept: FAMILY MEDICINE | Facility: CLINIC | Age: 22
End: 2017-09-22

## 2017-09-22 DIAGNOSIS — K21.9 GASTROESOPHAGEAL REFLUX DISEASE WITHOUT ESOPHAGITIS: Primary | ICD-10-CM

## 2017-09-22 NOTE — TELEPHONE ENCOUNTER
Please review and advise to my chart request for echo results.  Lissa Carroll RN - Triage  [facility]

## 2017-10-02 ENCOUNTER — MYC MEDICAL ADVICE (OUTPATIENT)
Dept: FAMILY MEDICINE | Facility: CLINIC | Age: 22
End: 2017-10-02

## 2017-10-02 DIAGNOSIS — Z30.41 ENCOUNTER FOR SURVEILLANCE OF CONTRACEPTIVE PILLS: Primary | ICD-10-CM

## 2017-10-02 RX ORDER — DROSPIRENONE AND ETHINYL ESTRADIOL 0.03MG-3MG
1 KIT ORAL DAILY
Qty: 30 TABLET | Refills: 11 | Status: SHIPPED | OUTPATIENT
Start: 2017-10-02 | End: 2018-04-12

## 2017-10-02 NOTE — TELEPHONE ENCOUNTER
Please see My Chart message below and advise as appropriate.  Lissa Carroll RN - Triage  [facility]

## 2017-10-02 NOTE — TELEPHONE ENCOUNTER
Please see MyChart response:    Yes, I do take them without any breaks, I have been taking them since I was 16 years old.  And my current period just started yesterday!    Inga Flower RN   [facility] - Triage

## 2017-10-02 NOTE — TELEPHONE ENCOUNTER
[employer] message sent to patient.   Inga Flower RN   [facility] - Triage

## 2017-10-02 NOTE — TELEPHONE ENCOUNTER
Please ask if She has been taking this continuously without breaks in medication and LMP prior to refill

## 2017-10-02 NOTE — TELEPHONE ENCOUNTER
OCP      Last Written Prescription Date: HISTORICAL  Last Office Visit with [facility], P or [facility] prescribing provider: 9/18/17    Routing refill request to provider for review/approval because:  Medication is reported/historical    Inga Flower RN   [facility] - Triage

## 2017-10-12 ENCOUNTER — OFFICE VISIT (OUTPATIENT)
Dept: FAMILY MEDICINE | Facility: CLINIC | Age: 22
End: 2017-10-12
Payer: COMMERCIAL

## 2017-10-12 VITALS
RESPIRATION RATE: 16 BRPM | SYSTOLIC BLOOD PRESSURE: 124 MMHG | BODY MASS INDEX: 21.03 KG/M2 | DIASTOLIC BLOOD PRESSURE: 77 MMHG | HEIGHT: 67 IN | TEMPERATURE: 98 F | OXYGEN SATURATION: 97 % | HEART RATE: 82 BPM | WEIGHT: 134 LBS

## 2017-10-12 DIAGNOSIS — F41.0 PANIC ATTACK: ICD-10-CM

## 2017-10-12 DIAGNOSIS — F41.1 GAD (GENERALIZED ANXIETY DISORDER): Primary | ICD-10-CM

## 2017-10-12 DIAGNOSIS — K21.9 GASTROESOPHAGEAL REFLUX DISEASE WITHOUT ESOPHAGITIS: ICD-10-CM

## 2017-10-12 PROCEDURE — 99213 OFFICE O/P EST LOW 20 MIN: CPT | Performed by: PHYSICIAN ASSISTANT

## 2017-10-12 RX ORDER — HYDROXYZINE HYDROCHLORIDE 25 MG/1
TABLET, FILM COATED ORAL
Qty: 60 TABLET | Refills: 0 | Status: SHIPPED | OUTPATIENT
Start: 2017-10-12 | End: 2017-11-08

## 2017-10-12 RX ORDER — PANTOPRAZOLE SODIUM 40 MG/1
40 TABLET, DELAYED RELEASE ORAL DAILY
Qty: 90 TABLET | Refills: 1 | Status: SHIPPED | OUTPATIENT
Start: 2017-10-12 | End: 2017-11-28

## 2017-10-12 NOTE — NURSING NOTE
"Chief Complaint   Patient presents with     Recheck Medication       Initial /77 (BP Location: Right arm, Patient Position: Chair, Cuff Size: Adult Regular)  Pulse 82  Temp 98  F (36.7  C) (Tympanic)  Resp 16  Ht 5' 7\" (1.702 m)  Wt 134 lb (60.8 kg)  LMP 08/15/2017 (Approximate)  SpO2 97%  BMI 20.99 kg/m2 Estimated body mass index is 20.99 kg/(m^2) as calculated from the following:    Height as of this encounter: 5' 7\" (1.702 m).    Weight as of this encounter: 134 lb (60.8 kg).  Medication Reconciliation: complete    Saadia Odonnell MA  "

## 2017-10-12 NOTE — PROGRESS NOTES
SUBJECTIVE:   Ary Lutz is a 22 year old female who presents to clinic today for the following health issues:      Concern - Medication Check      Anxiety/panic:  Taking Prozac 40 mg daily without issue  At previous doctor she was given script for Ativan for PRN use for panic attacks. She began to take ativan daily due to extreme anxiety and this medication was discontinued, she was then given valium for PRN use (?).  She has panic attacks about once every 3-4 months and needs to take her valium.  She is due for a refill and would like to have some around in case of panic symptoms.      GERD:  Lost referral to GI for refractory GERD.  Needs this to be reprinted          Problem list and histories reviewed & adjusted, as indicated.  Additional history: as documented    Patient Active Problem List   Diagnosis     SCOTT (generalized anxiety disorder)     Esophageal reflux     Panic attack     Past Surgical History:   Procedure Laterality Date     ESOPHAGOSCOPY, GASTROSCOPY, DUODENOSCOPY (EGD), COMBINED N/A 9/7/2017    Procedure: COMBINED ESOPHAGOSCOPY, GASTROSCOPY, DUODENOSCOPY (EGD), BIOPSY SINGLE OR MULTIPLE;  gastroscopy;  Surgeon: Hollis Prather MD;  Location:  GI       Social History   Substance Use Topics     Smoking status: Never Smoker     Smokeless tobacco: Never Used     Alcohol use No     Family History   Problem Relation Age of Onset     Thyroid Cancer Mother      Familial Adenomatous Polyposis (FAP) Father          Current Outpatient Prescriptions   Medication Sig Dispense Refill     pantoprazole (PROTONIX) 40 MG EC tablet Take 1 tablet (40 mg) by mouth daily 90 tablet 1     hydrOXYzine (ATARAX) 25 MG tablet Take 1-2 tablets by mouth for severe anxiety 60 tablet 0     drospirenone-ethinyl estradiol (BRANDON) 3-0.03 MG per tablet Take 1 tablet by mouth daily 30 tablet 11     FLUoxetine (PROZAC) 40 MG capsule TAKE 1 CAPSULE (40 MG) BY MOUTH DAILY 90 capsule 0     Allergies   Allergen  "Reactions     Bactrim [Sulfamethoxazole W/Trimethoprim] Rash         Reviewed and updated as needed this visit by clinical staffAllergies  Meds       Reviewed and updated as needed this visit by Provider         ROS:  Constitutional, HEENT, cardiovascular, pulmonary, gi and gu systems are negative, except as otherwise noted.      OBJECTIVE:   /77 (BP Location: Right arm, Patient Position: Chair, Cuff Size: Adult Regular)  Pulse 82  Temp 98  F (36.7  C) (Tympanic)  Resp 16  Ht 5' 7\" (1.702 m)  Wt 134 lb (60.8 kg)  LMP 08/15/2017 (Approximate)  SpO2 97%  BMI 20.99 kg/m2  Body mass index is 20.99 kg/(m^2).  GENERAL: healthy, alert and no distress  PSYCH: mentation appears normal, affect normal/bright    Diagnostic Test Results:  none     ASSESSMENT/PLAN:       1. SCOTT (generalized anxiety disorder)    2. Panic attack  Advise that she start with atarax due to safety profile and hx of dependence/abuse concerns.  She is agreeable.  Will follow up if persistent symptoms  - hydrOXYzine (ATARAX) 25 MG tablet; Take 1-2 tablets by mouth for severe anxiety  Dispense: 60 tablet; Refill: 0    3. Gastroesophageal reflux disease without esophagitis  - pantoprazole (PROTONIX) 40 MG EC tablet; Take 1 tablet (40 mg) by mouth daily  Dispense: 90 tablet; Refill: 1  - GASTROENTEROLOGY ADULT REF CONSULT ONLY    See Patient Instructions    Yanick Baca PA-C  [facility]  "

## 2017-10-12 NOTE — MR AVS SNAPSHOT
After Visit Summary   10/12/2017    Ary Lutz    MRN: [id number]           Patient Information     Date Of Birth          1995        Visit Information        Provider Department      10/12/2017 5:40 PM Yanick Baca PA-C [facility] [place]        Today's Diagnoses     SCOTT (generalized anxiety disorder)    -  1    Panic attack        Gastroesophageal reflux disease without esophagitis           Follow-ups after your visit        Additional Services     GASTROENTEROLOGY ADULT REF CONSULT ONLY       [facility]erred Location[facility] [phone]      Please be aware that coverage of these services is subject to the terms and limitations of your health insurance plan.  Call member services at your health plan with any benefit or coverage questions.  Any procedures must be performed at a [place] facility OR coordinated by your clinic's referral office.    Please bring the following with you to your appointment:    (1) Any X-Rays, CTs or MRIs which have been performed.  Contact the facility where they were done to arrange for  prior to your scheduled appointment.    (2) List of current medications   (3) This referral request   (4) Any documents/labs given to you for this referral                  Who to contact     If you have questions or need follow up information about today's clinic visit or your schedule please contact [facility] directly at [phone].  Normal or non-critical lab and imaging results will be communicated to you by MyChart, letter or phone within 4 business days after the clinic has received the results. If you do not hear from us within 7 days, please contact the clinic through MyChart or phone. If you have a critical or abnormal lab result, we will notify you by phone as soon as possible.  Submit refill requests through [employer] or call your pharmacy and they will forward the refill request to us. Please  "allow 3 business days for your refill to be completed.          Additional Information About Your Visit        ClipCardhart Information     [employer] gives you secure access to your electronic health record. If you see a primary care provider, you can also send messages to your care team and make appointments. If you have questions, please call your primary care clinic.  If you do not have a primary care provider, please call [phone] and they will assist you.        Care EveryWhere ID     This is your Care EveryWhere ID. This could be used by other organizations to access your [place] medical records  [id number]        Your Vitals Were     Pulse Temperature Respirations Height Last Period Pulse Oximetry    82 98  F (36.7  C) (Tympanic) 16 5' 7\" (1.702 m) 08/15/2017 (Approximate) 97%    BMI (Body Mass Index)                   20.99 kg/m2            Blood Pressure from Last 3 Encounters:   10/12/17 124/77   09/18/17 98/60   09/07/17 125/65    Weight from Last 3 Encounters:   10/12/17 134 lb (60.8 kg)   09/18/17 135 lb (61.2 kg)   09/07/17 130 lb (59 kg)              We Performed the Following     GASTROENTEROLOGY ADULT REF CONSULT ONLY          Today's Medication Changes          These changes are accurate as of: 10/12/17  5:54 PM.  If you have any questions, ask your nurse or doctor.               Start taking these medicines.        Dose/Directions    hydrOXYzine 25 MG tablet   Commonly known as:  ATARAX   Used for:  Panic attack   Started by:  Yanick Baca PA-C        Take 1-2 tablets by mouth for severe anxiety   Quantity:  60 tablet   Refills:  0         These medicines have changed or have updated prescriptions.        Dose/Directions    pantoprazole 40 MG EC tablet   Commonly known as:  PROTONIX   This may have changed:  See the new instructions.   Used for:  Gastroesophageal reflux disease without esophagitis   Changed by:  Yanick Baca PA-C        Dose:  40 mg   Take 1 tablet (40 mg) by " mouth daily   Quantity:  90 tablet   Refills:  1            Where to get your medicines      These medications were sent to [facility] [address] [facility] - [place], [place] - [address]  [address], [place] [address]     Phone:  [phone]     hydrOXYzine 25 MG tablet    pantoprazole 40 MG EC tablet                Primary Care Provider Office Phone # Fax #    Yanick Baca PA-C [phone]       [address]  [place] [address]        Equal Access to Services     [facility]: Hadii aad ku hadasho Soomaali, waaxda luqadaha, qaybta kaalmada adeegyada, waxmilagros marcos haygiuliana arroyo . So [facility] [phone].    ATENCIÓN: Si habla español, tiene a spangler disposición servicios gratuitos de asistencia lingüística. [facility] [phone].    We comply with applicable federal civil rights laws and [place] laws. We do not discriminate on the basis of race, color, national origin, age, disability, sex, sexual orientation, or gender identity.            Thank you!     Thank you for choosing [facility] [place]  for your care. Our goal is always to provide you with excellent care. Hearing back from our patients is one way we can continue to improve our services. Please take a few minutes to complete the written survey that you may receive in the mail after your visit with us. Thank you!             Your Updated Medication List - Protect others around you: Learn how to safely use, store and throw away your medicines at www.disposemymeds.org.          This list is accurate as of: 10/12/17  5:54 PM.  Always use your most recent med list.                   Brand Name Dispense Instructions for use Diagnosis    drospirenone-ethinyl estradiol 3-0.03 MG per tablet    BRANDON    30 tablet    Take 1 tablet by mouth daily    Encounter for surveillance of contraceptive pills       FLUoxetine 40 MG capsule    PROzac    90 capsule    TAKE 1 CAPSULE (40 MG) BY MOUTH  DAILY    SCOTT (generalized anxiety disorder)       hydrOXYzine 25 MG tablet    ATARAX    60 tablet    Take 1-2 tablets by mouth for severe anxiety    Panic attack       pantoprazole 40 MG EC tablet    PROTONIX    90 tablet    Take 1 tablet (40 mg) by mouth daily    Gastroesophageal reflux disease without esophagitis

## 2017-11-08 DIAGNOSIS — F41.0 PANIC ATTACK: ICD-10-CM

## 2017-11-08 RX ORDER — HYDROXYZINE HYDROCHLORIDE 25 MG/1
TABLET, FILM COATED ORAL
Qty: 60 TABLET | Refills: 0 | Status: SHIPPED | OUTPATIENT
Start: 2017-11-08 | End: 2018-04-12

## 2017-11-08 NOTE — TELEPHONE ENCOUNTER
Prescription approved per [facility], [facility] or [facility] refill protocol.  Lissa Carroll RN - Triage  [facility]

## 2017-11-28 ENCOUNTER — OFFICE VISIT (OUTPATIENT)
Dept: FAMILY MEDICINE | Facility: CLINIC | Age: 22
End: 2017-11-28
Payer: COMMERCIAL

## 2017-11-28 VITALS
SYSTOLIC BLOOD PRESSURE: 114 MMHG | TEMPERATURE: 97.5 F | RESPIRATION RATE: 16 BRPM | BODY MASS INDEX: 21.06 KG/M2 | OXYGEN SATURATION: 99 % | WEIGHT: 134.2 LBS | DIASTOLIC BLOOD PRESSURE: 73 MMHG | HEART RATE: 91 BPM | HEIGHT: 67 IN

## 2017-11-28 DIAGNOSIS — K21.9 GASTROESOPHAGEAL REFLUX DISEASE WITHOUT ESOPHAGITIS: ICD-10-CM

## 2017-11-28 DIAGNOSIS — F41.1 GAD (GENERALIZED ANXIETY DISORDER): Primary | ICD-10-CM

## 2017-11-28 DIAGNOSIS — F41.0 PANIC ATTACK: ICD-10-CM

## 2017-11-28 DIAGNOSIS — R42 LIGHTHEADEDNESS: ICD-10-CM

## 2017-11-28 LAB
BASOPHILS # BLD AUTO: 0 10E9/L (ref 0–0.2)
BASOPHILS NFR BLD AUTO: 0.2 %
DIFFERENTIAL METHOD BLD: ABNORMAL
EOSINOPHIL # BLD AUTO: 0.1 10E9/L (ref 0–0.7)
EOSINOPHIL NFR BLD AUTO: 1.7 %
ERYTHROCYTE [DISTWIDTH] IN BLOOD BY AUTOMATED COUNT: 14.1 % (ref 10–15)
HCT VFR BLD AUTO: 36.1 % (ref 35–47)
HGB BLD-MCNC: 12.2 G/DL (ref 11.7–15.7)
LYMPHOCYTES # BLD AUTO: 1.7 10E9/L (ref 0.8–5.3)
LYMPHOCYTES NFR BLD AUTO: 29.7 %
MCH RBC QN AUTO: 25.4 PG (ref 26.5–33)
MCHC RBC AUTO-ENTMCNC: 33.8 G/DL (ref 31.5–36.5)
MCV RBC AUTO: 75 FL (ref 78–100)
MONOCYTES # BLD AUTO: 0.5 10E9/L (ref 0–1.3)
MONOCYTES NFR BLD AUTO: 8.3 %
NEUTROPHILS # BLD AUTO: 3.5 10E9/L (ref 1.6–8.3)
NEUTROPHILS NFR BLD AUTO: 60.1 %
PLATELET # BLD AUTO: 225 10E9/L (ref 150–450)
RBC # BLD AUTO: 4.8 10E12/L (ref 3.8–5.2)
WBC # BLD AUTO: 5.8 10E9/L (ref 4–11)

## 2017-11-28 PROCEDURE — 80048 BASIC METABOLIC PNL TOTAL CA: CPT | Performed by: PHYSICIAN ASSISTANT

## 2017-11-28 PROCEDURE — 82728 ASSAY OF FERRITIN: CPT | Performed by: PHYSICIAN ASSISTANT

## 2017-11-28 PROCEDURE — 99213 OFFICE O/P EST LOW 20 MIN: CPT | Performed by: PHYSICIAN ASSISTANT

## 2017-11-28 PROCEDURE — 85025 COMPLETE CBC W/AUTO DIFF WBC: CPT | Performed by: PHYSICIAN ASSISTANT

## 2017-11-28 PROCEDURE — 36415 COLL VENOUS BLD VENIPUNCTURE: CPT | Performed by: PHYSICIAN ASSISTANT

## 2017-11-28 RX ORDER — ESOMEPRAZOLE MAGNESIUM 40 MG/1
40 CAPSULE, DELAYED RELEASE ORAL
Qty: 90 CAPSULE | Refills: 1 | Status: SHIPPED | OUTPATIENT
Start: 2017-11-28 | End: 2019-04-15 | Stop reason: ALTCHOICE

## 2017-11-28 ASSESSMENT — ANXIETY QUESTIONNAIRES
5. BEING SO RESTLESS THAT IT IS HARD TO SIT STILL: SEVERAL DAYS
IF YOU CHECKED OFF ANY PROBLEMS ON THIS QUESTIONNAIRE, HOW DIFFICULT HAVE THESE PROBLEMS MADE IT FOR YOU TO DO YOUR WORK, TAKE CARE OF THINGS AT HOME, OR GET ALONG WITH OTHER PEOPLE: SOMEWHAT DIFFICULT
GAD7 TOTAL SCORE: 7
2. NOT BEING ABLE TO STOP OR CONTROL WORRYING: SEVERAL DAYS
6. BECOMING EASILY ANNOYED OR IRRITABLE: SEVERAL DAYS
1. FEELING NERVOUS, ANXIOUS, OR ON EDGE: SEVERAL DAYS
3. WORRYING TOO MUCH ABOUT DIFFERENT THINGS: SEVERAL DAYS
7. FEELING AFRAID AS IF SOMETHING AWFUL MIGHT HAPPEN: SEVERAL DAYS

## 2017-11-28 ASSESSMENT — PATIENT HEALTH QUESTIONNAIRE - PHQ9
5. POOR APPETITE OR OVEREATING: SEVERAL DAYS
SUM OF ALL RESPONSES TO PHQ QUESTIONS 1-9: 2

## 2017-11-28 NOTE — PROGRESS NOTES
SUBJECTIVE:   Ary Lutz is a 22 year old female who presents to clinic today for the following health issues:    LAB REQUEST      Hx of anemia:  Had anemia in the past when she saw another provider.  Previously taking iron supplement.  Has been feeling intermittent lightheadedness for the past few months and would like her iron checked again.  No palpitations, HA, n/v, SOB noted.  Not currently symptomatic.    GERD: Taking protonix 40 mg daily, continues to experience regurgitation of food with eating.  Noted that stomach discomfort has improved, but refleux of food occurs despite her diet and is very uncomfortable for her.  She would like to try new PPI.  Has appointment with GI for February ( see last encounter)          Problem list and histories reviewed & adjusted, as indicated.  Additional history: as documented    Patient Active Problem List   Diagnosis     SCOTT (generalized anxiety disorder)     Esophageal reflux     Panic attack     Past Surgical History:   Procedure Laterality Date     ESOPHAGOSCOPY, GASTROSCOPY, DUODENOSCOPY (EGD), COMBINED N/A 9/7/2017    Procedure: COMBINED ESOPHAGOSCOPY, GASTROSCOPY, DUODENOSCOPY (EGD), BIOPSY SINGLE OR MULTIPLE;  gastroscopy;  Surgeon: Hollis Prather MD;  Location:  GI       Social History   Substance Use Topics     Smoking status: Never Smoker     Smokeless tobacco: Never Used     Alcohol use No     Family History   Problem Relation Age of Onset     Thyroid Cancer Mother      Familial Adenomatous Polyposis (FAP) Father          Current Outpatient Prescriptions   Medication Sig Dispense Refill     esomeprazole (NEXIUM) 40 MG CR capsule Take 1 capsule (40 mg) by mouth every morning (before breakfast) Take 30-60 minutes before a eating. 90 capsule 1     hydrOXYzine (ATARAX) 25 MG tablet TAKE 1-2 TABLETS BY MOUTH FOR SEVERE ANXIETY 60 tablet 0     drospirenone-ethinyl estradiol (BRANDON) 3-0.03 MG per tablet Take 1 tablet by mouth daily 30 tablet 11  "    FLUoxetine (PROZAC) 40 MG capsule TAKE 1 CAPSULE (40 MG) BY MOUTH DAILY 90 capsule 0     Allergies   Allergen Reactions     Bactrim [Sulfamethoxazole W/Trimethoprim] Rash         Reviewed and updated as needed this visit by clinical staff       Reviewed and updated as needed this visit by Provider         ROS:  Constitutional, HEENT, cardiovascular, pulmonary, gi and gu systems are negative, except as otherwise noted.      OBJECTIVE:   /73 (BP Location: Right arm, Patient Position: Chair, Cuff Size: Adult Regular)  Pulse 91  Temp 97.5  F (36.4  C) (Tympanic)  Resp 16  Ht 5' 7\" (1.702 m)  Wt 134 lb 3.2 oz (60.9 kg)  LMP 11/28/2017 (Exact Date)  SpO2 99%  BMI 21.02 kg/m2  Body mass index is 21.02 kg/(m^2).  GENERAL: healthy, alert and no distress  RESP: lungs clear to auscultation - no rales, rhonchi or wheezes  CV: regular rate and rhythm, normal S1 S2, no S3 or S4, no murmur  PSYCH: mentation appears normal, affect normal/bright    Diagnostic Test Results:  none     ASSESSMENT/PLAN:       1. Lightheadedness  Recent echo normal, will assess with labs at this time and make recommendations based on results  - Ferritin  - CBC with platelets differential  - Basic metabolic panel    2. SCOTT (generalized anxiety disorder)  Controlled, see PHQ9/SCOTT    3. Gastroesophageal reflux disease without esophagitis  Will try nexium daily, STOP protonix.  Plan for GI follow up in February, sooner if possible.  EGD normal.  - esomeprazole (NEXIUM) 40 MG CR capsule; Take 1 capsule (40 mg) by mouth every morning (before breakfast) Take 30-60 minutes before a eating.  Dispense: 90 capsule; Refill: 1    4. Panic attack  controlled      See Patient Instructions    Yanick Baca PA-C  [facility]  "

## 2017-11-28 NOTE — NURSING NOTE
"Chief Complaint   Patient presents with     LAB REQUEST     check iron levels       Initial /73 (BP Location: Right arm, Patient Position: Chair, Cuff Size: Adult Regular)  Pulse 91  Temp 97.5  F (36.4  C) (Tympanic)  Resp 16  Ht 5' 7\" (1.702 m)  Wt 134 lb 3.2 oz (60.9 kg)  LMP 11/28/2017 (Exact Date)  SpO2 99%  BMI 21.02 kg/m2 Estimated body mass index is 21.02 kg/(m^2) as calculated from the following:    Height as of this encounter: 5' 7\" (1.702 m).    Weight as of this encounter: 134 lb 3.2 oz (60.9 kg).  Medication Reconciliation: complete    Saadia Odonnell MA  "

## 2017-11-28 NOTE — MR AVS SNAPSHOT
After Visit Summary   11/28/2017    Ary Lutz    MRN: [id number]           Patient Information     Date Of Birth          1995        Visit Information        Provider Department      11/28/2017 3:40 PM Yanick Baca PA-C [facility] [place]        Today's Diagnoses     SCOTT (generalized anxiety disorder)    -  1    Lightheadedness        Gastroesophageal reflux disease without esophagitis        Panic attack           Follow-ups after your visit        Follow-up notes from your care team     Return if symptoms worsen or fail to improve.      Who to contact     If you have questions or need follow up information about today's clinic visit or your schedule please contact [facility] directly at [phone].  Normal or non-critical lab and imaging results will be communicated to you by MacroSolvehart, letter or phone within 4 business days after the clinic has received the results. If you do not hear from us within 7 days, please contact the clinic through [employer] or phone. If you have a critical or abnormal lab result, we will notify you by phone as soon as possible.  Submit refill requests through [employer] or call your pharmacy and they will forward the refill request to us. Please allow 3 business days for your refill to be completed.          Additional Information About Your Visit        MyChart Information     [employer] gives you secure access to your electronic health record. If you see a primary care provider, you can also send messages to your care team and make appointments. If you have questions, please call your primary care clinic.  If you do not have a primary care provider, please call [phone] and they will assist you.        Care EveryWhere ID     This is your Care EveryWhere ID. This could be used by other organizations to access your [place] medical records  [id number]        Your Vitals Were     Pulse Temperature Respirations Height  "Last Period Pulse Oximetry    91 97.5  F (36.4  C) (Tympanic) 16 5' 7\" (1.702 m) 11/28/2017 (Exact Date) 99%    BMI (Body Mass Index)                   21.02 kg/m2            Blood Pressure from Last 3 Encounters:   11/28/17 114/73   10/12/17 124/77   09/18/17 98/60    Weight from Last 3 Encounters:   11/28/17 134 lb 3.2 oz (60.9 kg)   10/12/17 134 lb (60.8 kg)   09/18/17 135 lb (61.2 kg)              We Performed the Following     Basic metabolic panel     CBC with platelets differential     Ferritin          Today's Medication Changes          These changes are accurate as of: 11/28/17  4:14 PM.  If you have any questions, ask your nurse or doctor.               Start taking these medicines.        Dose/Directions    esomeprazole 40 MG CR capsule   Commonly known as:  nexIUM   Used for:  Gastroesophageal reflux disease without esophagitis   Started by:  Yanick Baca PA-C        Dose:  40 mg   Take 1 capsule (40 mg) by mouth every morning (before breakfast) Take 30-60 minutes before a eating.   Quantity:  90 capsule   Refills:  1            Where to get your medicines      These medications were sent to [facility] [address] [facility] [place] [address]  [address] [facility] [address]     Phone:  [phone]     esomeprazole 40 MG CR capsule                Primary Care Provider Office Phone # Fax #    Yanick Baca PA-C [phone]       [address]  [place] [address]        Equal Access to Services     [facility]: Hadii aad ku hadasho Soomaali, waaxda luqadaha, qaybta kaalmada adeegyada, hollis helms. So [facility] [phone].    ATENCIÓN: Si habla español, tiene a spangler disposición servicios gratuitos de asistencia lingüística. Llame al [phone].    We comply with applicable federal civil rights laws and [place] laws. We do not discriminate on the basis of race, color, national origin, age, disability, " sex, sexual orientation, or gender identity.            Thank you!     Thank you for choosing [facility] [place]  for your care. Our goal is always to provide you with excellent care. Hearing back from our patients is one way we can continue to improve our services. Please take a few minutes to complete the written survey that you may receive in the mail after your visit with us. Thank you!             Your Updated Medication List - Protect others around you: Learn how to safely use, store and throw away your medicines at www.disposemymeds.org.          This list is accurate as of: 11/28/17  4:14 PM.  Always use your most recent med list.                   Brand Name Dispense Instructions for use Diagnosis    drospirenone-ethinyl estradiol 3-0.03 MG per tablet    BRANDON    30 tablet    Take 1 tablet by mouth daily    Encounter for surveillance of contraceptive pills       esomeprazole 40 MG CR capsule    nexIUM    90 capsule    Take 1 capsule (40 mg) by mouth every morning (before breakfast) Take 30-60 minutes before a eating.    Gastroesophageal reflux disease without esophagitis       FLUoxetine 40 MG capsule    PROzac    90 capsule    TAKE 1 CAPSULE (40 MG) BY MOUTH DAILY    SCOTT (generalized anxiety disorder)       hydrOXYzine 25 MG tablet    ATARAX    60 tablet    TAKE 1-2 TABLETS BY MOUTH FOR SEVERE ANXIETY    Panic attack

## 2017-11-29 LAB
ANION GAP SERPL CALCULATED.3IONS-SCNC: 10 MMOL/L (ref 3–14)
BUN SERPL-MCNC: 13 MG/DL (ref 7–30)
CALCIUM SERPL-MCNC: 9 MG/DL (ref 8.5–10.1)
CHLORIDE SERPL-SCNC: 105 MMOL/L (ref 94–109)
CO2 SERPL-SCNC: 25 MMOL/L (ref 20–32)
CREAT SERPL-MCNC: 0.64 MG/DL (ref 0.52–1.04)
FERRITIN SERPL-MCNC: 4 NG/ML (ref 12–150)
GFR SERPL CREATININE-BSD FRML MDRD: >90 ML/MIN/1.7M2
GLUCOSE SERPL-MCNC: 79 MG/DL (ref 70–99)
POTASSIUM SERPL-SCNC: 3.9 MMOL/L (ref 3.4–5.3)
SODIUM SERPL-SCNC: 140 MMOL/L (ref 133–144)

## 2017-11-29 ASSESSMENT — ANXIETY QUESTIONNAIRES: GAD7 TOTAL SCORE: 7

## 2017-11-30 NOTE — PROGRESS NOTES
Ary-  Here are your recent results.      Your CBC ( blood counts) show that your red blood cells are slightly small indicating early anemia.  Your ferritin ( iron storage) is also low indicating iron deficiency that is the likely cause of this.  This is common in young women.  Please begin a daily iron supplement ( 325 mg ferrous sulfate once daily) and increase to twice daily as tolerated. Take this supplement with orange juice or another form of vitamin C to help with absorption. This medication may cause some constipation at first. You may continue to eat a high fiber diet and a probiotic to combat this.      Please let me know if you have questions!    If you have any questions please do not hesitate to contact our office via phone ([phone]) or you may send me a message via twtMob by clicking the contact my Care Team link.      It was a pleasure participating in your care!    Thank you,    Yanick Baca MPH, PA-C  [address] [place] [address] [phone]

## 2017-12-06 DIAGNOSIS — F41.1 GAD (GENERALIZED ANXIETY DISORDER): ICD-10-CM

## 2017-12-06 RX ORDER — FLUOXETINE 40 MG/1
CAPSULE ORAL
Qty: 90 CAPSULE | Refills: 3 | Status: SHIPPED | OUTPATIENT
Start: 2017-12-06 | End: 2018-04-12

## 2017-12-06 NOTE — TELEPHONE ENCOUNTER
LOV 11/28/17. Depression is not on the problem list.  Prescription approved per [facility] [place].  Shae Johnston RN

## 2017-12-06 NOTE — TELEPHONE ENCOUNTER
FLUoxetine (PROZAC) 40 MG capsule  Last Written Prescription Date: 9/7/17  Last Fill Quantity: 90,  # refills: 0   Last Office Visit with [facility], [facility] or [facility] prescribing provider: 11/28/17                                           Requested Prescriptions   Pending Prescriptions Disp Refills     FLUoxetine (PROZAC) 40 MG capsule [Pharmacy Med Name: FLUOXETINE HCL 40 MG CAPSULE] 90 capsule 0     Sig: TAKE 1 CAPSULE (40 MG) BY MOUTH DAILY    [facility]RIs Protocol Passed    12/6/2017  1:36 AM    PHQ-9 SCORE 12/22/2016 8/9/2017 11/28/2017   Total Score MyChart - 2 (Minimal depression) -   Total Score 4 2 2     SCOTT-7 SCORE 12/22/2016 11/28/2017   Total Score 3 7          Passed - Recent or future visit with authorizing provider    Patient had office visit in the last year or has a visit in the next 30 days with authorizing provider.  See chart review.            Passed - Medication is NOT Bupropion    If the medication is Bupropion (Wellbutrin), and the patient is taking for smoking cessation; OK to refill.         Passed - Patient is age 18 or older       Passed - No active pregnancy on record       Passed - No positive pregnancy test in last 12 months        PHQ-9 SCORE 12/22/2016 8/9/2017 11/28/2017   Total Score MyChart - 2 (Minimal depression) -   Total Score 4 2 2     No flowsheet data found.

## 2018-01-19 ENCOUNTER — OFFICE VISIT (OUTPATIENT)
Dept: FAMILY MEDICINE | Facility: CLINIC | Age: 23
End: 2018-01-19
Payer: COMMERCIAL

## 2018-01-19 VITALS
HEIGHT: 67 IN | BODY MASS INDEX: 20.56 KG/M2 | TEMPERATURE: 98.3 F | DIASTOLIC BLOOD PRESSURE: 74 MMHG | HEART RATE: 92 BPM | WEIGHT: 131 LBS | SYSTOLIC BLOOD PRESSURE: 119 MMHG | OXYGEN SATURATION: 99 % | RESPIRATION RATE: 12 BRPM

## 2018-01-19 DIAGNOSIS — G44.019 EPISODIC CLUSTER HEADACHE, NOT INTRACTABLE: ICD-10-CM

## 2018-01-19 DIAGNOSIS — J01.90 ACUTE SINUSITIS WITH SYMPTOMS > 10 DAYS: Primary | ICD-10-CM

## 2018-01-19 PROCEDURE — 99214 OFFICE O/P EST MOD 30 MIN: CPT | Performed by: FAMILY MEDICINE

## 2018-01-19 RX ORDER — CEFDINIR 300 MG/1
300 CAPSULE ORAL 2 TIMES DAILY
Qty: 20 CAPSULE | Refills: 0 | Status: SHIPPED | OUTPATIENT
Start: 2018-01-19 | End: 2018-02-22

## 2018-01-19 NOTE — MR AVS SNAPSHOT
After Visit Summary   1/19/2018    Ary Lutz    MRN: [id number]           Patient Information     Date Of Birth          1995        Visit Information        Provider Department      1/19/2018 11:20 AM Frankie Martins MD [facility] [place]        Today's Diagnoses     Acute sinusitis with symptoms > 10 days    -  1    Episodic cluster headache, not intractable           Follow-ups after your visit        Follow-up notes from your care team     Return in about 2 weeks (around 2/2/2018).      Who to contact     If you have questions or need follow up information about today's clinic visit or your schedule please contact [facility] directly at [phone].  Normal or non-critical lab and imaging results will be communicated to you by MyChart, letter or phone within 4 business days after the clinic has received the results. If you do not hear from us within 7 days, please contact the clinic through [employer] or phone. If you have a critical or abnormal lab result, we will notify you by phone as soon as possible.  Submit refill requests through [employer] or call your pharmacy and they will forward the refill request to us. Please allow 3 business days for your refill to be completed.          Additional Information About Your Visit        MyChart Information     [employer] gives you secure access to your electronic health record. If you see a primary care provider, you can also send messages to your care team and make appointments. If you have questions, please call your primary care clinic.  If you do not have a primary care provider, please call [phone] and they will assist you.        Care EveryWhere ID     This is your Care EveryWhere ID. This could be used by other organizations to access your [place] medical records  [id number]        Your Vitals Were     Pulse Temperature Respirations Height Last Period Pulse Oximetry    92 98.3  F (36.8  C) (Tympanic) 12 5'  "7\" (1.702 m) 12/29/2017 (Approximate) 99%    BMI (Body Mass Index)                   20.52 kg/m2            Blood Pressure from Last 3 Encounters:   01/19/18 119/74   11/28/17 114/73   10/12/17 124/77    Weight from Last 3 Encounters:   01/19/18 131 lb (59.4 kg)   11/28/17 134 lb 3.2 oz (60.9 kg)   10/12/17 134 lb (60.8 kg)              Today, you had the following     No orders found for display         Today's Medication Changes          These changes are accurate as of: 1/19/18 11:44 AM.  If you have any questions, ask your nurse or doctor.               Start taking these medicines.        Dose/Directions    cefdinir 300 MG capsule   Commonly known as:  OMNICEF   Used for:  Acute sinusitis with symptoms > 10 days   Started by:  Frankie Martins MD        Dose:  300 mg   Take 1 capsule (300 mg) by mouth 2 times daily   Quantity:  20 capsule   Refills:  0            Where to get your medicines      These medications were sent to [place] [id number] [facility] [address]  [address] [place] [address]     Phone:  [phone]     cefdinir 300 MG capsule                Primary Care Provider Office Phone # Fax #    Yanick Baca PA-C [phone] [address]  [place] [address]        Equal Access to Services     [facility] AH: Hadii aad ku hadasho Soomaali, waaxda luqadaha, qaybta kaalmada adeegyada, hollis helms. So [facility] [phone].    ATENCIÓN: Si habla español, tiene a spangler disposición servicios gratuitos de asistencia lingüística. Llame al [phone].    We comply with applicable federal civil rights laws and [place] laws. We do not discriminate on the basis of race, color, national origin, age, disability, sex, sexual orientation, or gender identity.            Thank you!     Thank you for choosing [facility] [place]  for your care. Our goal is always to provide you with excellent care. " Hearing back from our patients is one way we can continue to improve our services. Please take a few minutes to complete the written survey that you may receive in the mail after your visit with us. Thank you!             Your Updated Medication List - Protect others around you: Learn how to safely use, store and throw away your medicines at www.disposemymeds.org.          This list is accurate as of: 1/19/18 11:44 AM.  Always use your most recent med list.                   Brand Name Dispense Instructions for use Diagnosis    cefdinir 300 MG capsule    OMNICEF    20 capsule    Take 1 capsule (300 mg) by mouth 2 times daily    Acute sinusitis with symptoms > 10 days       drospirenone-ethinyl estradiol 3-0.03 MG per tablet    BRANDON    30 tablet    Take 1 tablet by mouth daily    Encounter for surveillance of contraceptive pills       esomeprazole 40 MG CR capsule    nexIUM    90 capsule    Take 1 capsule (40 mg) by mouth every morning (before breakfast) Take 30-60 minutes before a eating.    Gastroesophageal reflux disease without esophagitis       FLUoxetine 40 MG capsule    PROzac    90 capsule    TAKE 1 CAPSULE (40 MG) BY MOUTH DAILY    SCOTT (generalized anxiety disorder)       hydrOXYzine 25 MG tablet    ATARAX    60 tablet    TAKE 1-2 TABLETS BY MOUTH FOR SEVERE ANXIETY    Panic attack

## 2018-01-19 NOTE — PROGRESS NOTES
SUBJECTIVE:   Ary Luzt is a 22 year old female who presents to clinic today for the following health issues:      Headache       Duration: 1 day    Description (location/character/radiation): severe headache last night, now better     Intensity:  mild    Accompanying signs and symptoms: facial pain on right side, nausea    History (similar episodes/previous evaluation): None    Precipitating or alleviating factors: None    Therapies tried and outcome: Ibuprofen, Excedrin, decongestant        Problem list and histories reviewed & adjusted, as indicated.  Additional history: as documented    Patient Active Problem List   Diagnosis     SCOTT (generalized anxiety disorder)     Esophageal reflux     Panic attack     Past Surgical History:   Procedure Laterality Date     ESOPHAGOSCOPY, GASTROSCOPY, DUODENOSCOPY (EGD), COMBINED N/A 9/7/2017    Procedure: COMBINED ESOPHAGOSCOPY, GASTROSCOPY, DUODENOSCOPY (EGD), BIOPSY SINGLE OR MULTIPLE;  gastroscopy;  Surgeon: Hollis Prather MD;  Location:  GI       Social History   Substance Use Topics     Smoking status: Never Smoker     Smokeless tobacco: Never Used     Alcohol use No     Family History   Problem Relation Age of Onset     Thyroid Cancer Mother      Familial Adenomatous Polyposis (FAP) Father          Current Outpatient Prescriptions   Medication Sig Dispense Refill     cefdinir (OMNICEF) 300 MG capsule Take 1 capsule (300 mg) by mouth 2 times daily 20 capsule 0     FLUoxetine (PROZAC) 40 MG capsule TAKE 1 CAPSULE (40 MG) BY MOUTH DAILY 90 capsule 3     hydrOXYzine (ATARAX) 25 MG tablet TAKE 1-2 TABLETS BY MOUTH FOR SEVERE ANXIETY 60 tablet 0     drospirenone-ethinyl estradiol (BRANDON) 3-0.03 MG per tablet Take 1 tablet by mouth daily 30 tablet 11     esomeprazole (NEXIUM) 40 MG CR capsule Take 1 capsule (40 mg) by mouth every morning (before breakfast) Take 30-60 minutes before a eating. (Patient not taking: Reported on 1/19/2018) 90 capsule 1  "    Allergies   Allergen Reactions     Bactrim [Sulfamethoxazole W/Trimethoprim] Rash     Recent Labs   Lab Test  11/28/17   1611   CR  0.64   GFRESTIMATED  >90   GFRESTBLACK  >90   POTASSIUM  3.9      BP Readings from Last 3 Encounters:   01/19/18 119/74   11/28/17 114/73   10/12/17 124/77    Wt Readings from Last 3 Encounters:   01/19/18 131 lb (59.4 kg)   11/28/17 134 lb 3.2 oz (60.9 kg)   10/12/17 134 lb (60.8 kg)              Reviewed and updated as needed this visit by clinical staff     Reviewed and updated as needed this visit by Provider         ROS:  Constitutional, HEENT, cardiovascular, pulmonary, gi and gu systems are negative, except as otherwise noted.      OBJECTIVE:   /74 (Cuff Size: Adult Regular)  Pulse 92  Temp 98.3  F (36.8  C) (Tympanic)  Resp 12  Ht 5' 7\" (1.702 m)  Wt 131 lb (59.4 kg)  LMP 12/29/2017 (Approximate)  SpO2 99%  BMI 20.52 kg/m2  Body mass index is 20.52 kg/(m^2).  GENERAL: healthy, alert and no distress  NECK: no adenopathy, no asymmetry, masses, or scars and thyroid normal to palpation  RESP: lungs clear to auscultation - no rales, rhonchi or wheezes  CV: regular rate and rhythm, normal S1 S2, no S3 or S4, no murmur, click or rub, no peripheral edema and peripheral pulses strong  ABDOMEN: soft, nontender, no hepatosplenomegaly, no masses and bowel sounds normal  MS: no gross musculoskeletal defects noted, no edema        ASSESSMENT/PLAN:   ASSESSMENT / PLAN:  (J01.90) Acute sinusitis with symptoms > 10 days  (primary encounter diagnosis)  Comment: has purulent post nasal drainage on right nostril, maxillary tenderness on right side, will have her to try with abx  Plan: cefdinir (OMNICEF) 300 MG capsule            (G44.019) Episodic cluster headache, not intractable  Comment: she has cluster HA worsening with sinus infection   Plan: will monitor closely, and if no improving within next 2 weeks, will consider adding abortive regiment with sumatriptan       FUTURE " APPOINTMENTS:       - Follow-up visit in 2 weeks if cluster HA sx not improving     Frankie Martins MD  [facility]

## 2018-01-19 NOTE — NURSING NOTE
"Chief Complaint   Patient presents with     Headache       Initial /74 (Cuff Size: Adult Regular)  Pulse 92  Temp 98.3  F (36.8  C) (Tympanic)  Resp 12  Ht 5' 7\" (1.702 m)  Wt 131 lb (59.4 kg)  LMP 12/29/2017 (Approximate)  SpO2 99%  BMI 20.52 kg/m2 Estimated body mass index is 20.52 kg/(m^2) as calculated from the following:    Height as of this encounter: 5' 7\" (1.702 m).    Weight as of this encounter: 131 lb (59.4 kg).  Medication Reconciliation: complete   Fara Newton, CMA  "

## 2018-02-22 ENCOUNTER — OFFICE VISIT (OUTPATIENT)
Dept: FAMILY MEDICINE | Facility: CLINIC | Age: 23
End: 2018-02-22
Payer: COMMERCIAL

## 2018-02-22 VITALS
OXYGEN SATURATION: 97 % | BODY MASS INDEX: 20.88 KG/M2 | HEIGHT: 67 IN | HEART RATE: 100 BPM | WEIGHT: 133 LBS | DIASTOLIC BLOOD PRESSURE: 85 MMHG | TEMPERATURE: 98.2 F | SYSTOLIC BLOOD PRESSURE: 119 MMHG

## 2018-02-22 DIAGNOSIS — J01.90 ACUTE SINUSITIS WITH SYMPTOMS > 10 DAYS: ICD-10-CM

## 2018-02-22 DIAGNOSIS — K64.8 INTERNAL HEMORRHOIDS: ICD-10-CM

## 2018-02-22 DIAGNOSIS — K21.9 GASTROESOPHAGEAL REFLUX DISEASE WITHOUT ESOPHAGITIS: Primary | ICD-10-CM

## 2018-02-22 PROCEDURE — 99213 OFFICE O/P EST LOW 20 MIN: CPT | Performed by: PHYSICIAN ASSISTANT

## 2018-02-22 RX ORDER — METHYLPREDNISOLONE 4 MG
TABLET, DOSE PACK ORAL
Qty: 21 TABLET | Refills: 0 | Status: SHIPPED | OUTPATIENT
Start: 2018-02-22 | End: 2018-03-12

## 2018-02-22 RX ORDER — HYDROCORTISONE ACETATE 25 MG/1
25 SUPPOSITORY RECTAL 2 TIMES DAILY
Qty: 28 SUPPOSITORY | Refills: 1 | Status: SHIPPED | OUTPATIENT
Start: 2018-02-22 | End: 2018-04-12

## 2018-02-22 NOTE — PROGRESS NOTES
SUBJECTIVE:   Ary Lutz is a 22 year old female who presents to clinic today for the following health issues:      Hemorrhoids  Onset:  2 weeks ago     Description:   Pain: YES  Itching: no     Accompanying Signs & Symptoms:  Blood streaked toilet paper: YES  Blood in stool: no   Changes in stool pattern: no     History:   Any previous GI studies done:endoscopy   Family History of colon cancer: no     Precipitating factors:   None    Alleviating factors:  None    Therapies Tried and outcome: none       Hx of constipation.  Over the past few weeks she has noted 2-3 episodes of BRB per rectum while having a BM.  Blood is noted as a small amount of streaking on the toilet paper.   She is having mild pain with BMs, no f/c, n/v/d.      Sinusitis: Was treated 1 month ago for sinusitis.  She continues to have intermittent facial pain on the side, nasal congestion has improved, no f/c.           Problem list and histories reviewed & adjusted, as indicated.  Additional history: as documented    Patient Active Problem List   Diagnosis     SCOTT (generalized anxiety disorder)     Esophageal reflux     Panic attack     Past Surgical History:   Procedure Laterality Date     ESOPHAGOSCOPY, GASTROSCOPY, DUODENOSCOPY (EGD), COMBINED N/A 9/7/2017    Procedure: COMBINED ESOPHAGOSCOPY, GASTROSCOPY, DUODENOSCOPY (EGD), BIOPSY SINGLE OR MULTIPLE;  gastroscopy;  Surgeon: Hollis Prather MD;  Location:  GI       Social History   Substance Use Topics     Smoking status: Never Smoker     Smokeless tobacco: Never Used     Alcohol use No     Family History   Problem Relation Age of Onset     Thyroid Cancer Mother      Familial Adenomatous Polyposis (FAP) Father          Current Outpatient Prescriptions   Medication Sig Dispense Refill     hydrocortisone (ANUSOL-HC) 25 MG Suppository Place 1 suppository (25 mg) rectally 2 times daily 28 suppository 1     methylPREDNISolone (MEDROL DOSEPAK) 4 MG tablet Follow package  "instructions 21 tablet 0     FLUoxetine (PROZAC) 40 MG capsule TAKE 1 CAPSULE (40 MG) BY MOUTH DAILY 90 capsule 3     esomeprazole (NEXIUM) 40 MG CR capsule Take 1 capsule (40 mg) by mouth every morning (before breakfast) Take 30-60 minutes before a eating. 90 capsule 1     hydrOXYzine (ATARAX) 25 MG tablet TAKE 1-2 TABLETS BY MOUTH FOR SEVERE ANXIETY 60 tablet 0     drospirenone-ethinyl estradiol (BRANDON) 3-0.03 MG per tablet Take 1 tablet by mouth daily 30 tablet 11     Allergies   Allergen Reactions     Bactrim [Sulfamethoxazole W/Trimethoprim] Rash       Reviewed and updated as needed this visit by clinical staff       Reviewed and updated as needed this visit by Provider         ROS:  Constitutional, HEENT, cardiovascular, pulmonary, gi and gu systems are negative, except as otherwise noted.    OBJECTIVE:     /85  Pulse 100  Temp 98.2  F (36.8  C) (Oral)  Ht 5' 7.32\" (1.71 m)  Wt 133 lb (60.3 kg)  LMP 02/20/2018  SpO2 97%  BMI 20.63 kg/m2  Body mass index is 20.63 kg/(m^2).  GENERAL: healthy, alert and no distress  HENT: TTP along right maxillary sinus  ABDOMEN: soft, nontender, no hepatosplenomegaly, no masses and bowel sounds normal  RECTAL (female): small non thrombosing internal hemorrhoid noted on anoscopy, no active bleeding.       ASSESSMENT/PLAN:     1. Internal hemorrhoids  Will treat with anusol suppository, advise that she eat high fiber, lots of water and work to avoid constipation.  - hydrocortisone (ANUSOL-HC) 25 MG Suppository; Place 1 suppository (25 mg) rectally 2 times daily  Dispense: 28 suppository; Refill: 1    2. Gastroesophageal reflux disease without esophagitis  Per GI    3. Acute sinusitis with symptoms > 10 days  Continued right sided maxillary sinus pain following antibiotics, symptoms are improved but not at baseline.  Will try steroid x 7 days, she will RTC if new or worsening.  - methylPREDNISolone (MEDROL DOSEPAK) 4 MG tablet; Follow package instructions  Dispense: " 21 tablet; Refill: 0    See Patient Instructions    Yanick Baca PA-C  [facility] [place] [place]

## 2018-02-22 NOTE — MR AVS SNAPSHOT
After Visit Summary   2/22/2018    Ary Lutz    MRN: [id number]           Patient Information     Date Of Birth          1995        Visit Information        Provider Department      2/22/2018 7:40 AM [facility]        Today's Diagnoses     Gastroesophageal reflux disease without esophagitis    -  1    Internal hemorrhoids           Follow-ups after your visit        Follow-up notes from your care team     Return if symptoms worsen or fail to improve.      Who to contact     If you have questions or need follow up information about today's clinic visit or your schedule please contact [facility] directly at [phone].  Normal or non-critical lab and imaging results will be communicated to you by [employer], letter or phone within 4 business days after the clinic has received the results. If you do not hear from us within 7 days, please contact the clinic through [employer] or phone. If you have a critical or abnormal lab result, we will notify you by phone as soon as possible.  Submit refill requests through [employer] or call your pharmacy and they will forward the refill request to us. Please allow 3 business days for your refill to be completed.          Additional Information About Your Visit        MyChart Information     [employer] gives you secure access to your electronic health record. If you see a primary care provider, you can also send messages to your care team and make appointments. If you have questions, please call your primary care clinic.  If you do not have a primary care provider, please call [phone] and they will assist you.        Care EveryWhere ID     This is your Care EveryWhere ID. This could be used by other organizations to access your [place] medical records  [id number]        Your Vitals Were     Pulse Temperature Height Last Period Pulse Oximetry BMI (Body Mass Index)    100 98.2  F (36.8  C)  "(Oral) 5' 7.32\" (1.71 m) 02/20/2018 97% 20.63 kg/m2       Blood Pressure from Last 3 Encounters:   02/22/18 119/85   01/19/18 119/74   11/28/17 114/73    Weight from Last 3 Encounters:   02/22/18 133 lb (60.3 kg)   01/19/18 131 lb (59.4 kg)   11/28/17 134 lb 3.2 oz (60.9 kg)              Today, you had the following     No orders found for display         Today's Medication Changes          These changes are accurate as of 2/22/18  8:16 AM.  If you have any questions, ask your nurse or doctor.               Start taking these medicines.        Dose/Directions    hydrocortisone 25 MG Suppository   Commonly known as:  ANUSOL-HC   Used for:  Internal hemorrhoids   Started by:  Yanick Baca PA-C        Dose:  25 mg   Place 1 suppository (25 mg) rectally 2 times daily   Quantity:  28 suppository   Refills:  1            Where to get your medicines      These medications were sent to [place] [address] [facility] [address]  [address] [place] [address]     Phone:  [phone]     hydrocortisone 25 MG Suppository                Primary Care Provider Office Phone # Fax #    Yanick Baca PA-C [phone]       [address]  [place] [address]        Equal Access to Services     [facility] AH: Hadii gill navarro hadgiovannyo Socira, waaxda luqadaha, qaybta kaalmada adeegyada, hollis cramer ademarcelo helms. So [facility] [phone].    ATENCIÓN: Si habla español, tiene a spangler disposición servicios gratuitos de asistencia lingüística. Llame al [phone].    We comply with applicable federal civil rights laws and [place] laws. We do not discriminate on the basis of race, color, national origin, age, disability, sex, sexual orientation, or gender identity.            Thank you!     Thank you for choosing [facility] [place]  for your care. Our goal is always to provide you with excellent care. Hearing back from our patients is " one way we can continue to improve our services. Please take a few minutes to complete the written survey that you may receive in the mail after your visit with us. Thank you!             Your Updated Medication List - Protect others around you: Learn how to safely use, store and throw away your medicines at www.disposemymeds.org.          This list is accurate as of 2/22/18  8:16 AM.  Always use your most recent med list.                   Brand Name Dispense Instructions for use Diagnosis    drospirenone-ethinyl estradiol 3-0.03 MG per tablet    BRANDON    30 tablet    Take 1 tablet by mouth daily    Encounter for surveillance of contraceptive pills       esomeprazole 40 MG CR capsule    nexIUM    90 capsule    Take 1 capsule (40 mg) by mouth every morning (before breakfast) Take 30-60 minutes before a eating.    Gastroesophageal reflux disease without esophagitis       FLUoxetine 40 MG capsule    PROzac    90 capsule    TAKE 1 CAPSULE (40 MG) BY MOUTH DAILY    SCOTT (generalized anxiety disorder)       hydrocortisone 25 MG Suppository    ANUSOL-HC    28 suppository    Place 1 suppository (25 mg) rectally 2 times daily    Internal hemorrhoids       hydrOXYzine 25 MG tablet    ATARAX    60 tablet    TAKE 1-2 TABLETS BY MOUTH FOR SEVERE ANXIETY    Panic attack

## 2018-03-06 ENCOUNTER — HOSPITAL ENCOUNTER (EMERGENCY)
Facility: CLINIC | Age: 23
Discharge: HOME | End: 2018-03-07
Attending: EMERGENCY MEDICINE | Admitting: EMERGENCY MEDICINE
Payer: COMMERCIAL

## 2018-03-06 DIAGNOSIS — K21.9 GASTROESOPHAGEAL REFLUX DISEASE, ESOPHAGITIS PRESENCE NOT SPECIFIED: ICD-10-CM

## 2018-03-06 DIAGNOSIS — K29.00 ACUTE GASTRITIS, PRESENCE OF BLEEDING UNSPECIFIED, UNSPECIFIED GASTRITIS TYPE: ICD-10-CM

## 2018-03-06 LAB
ALBUMIN SERPL-MCNC: 3.8 G/DL (ref 3.4–5)
ALP SERPL-CCNC: 56 U/L (ref 40–150)
ALT SERPL W P-5'-P-CCNC: 17 U/L (ref 0–50)
ANION GAP SERPL CALCULATED.3IONS-SCNC: 8 MMOL/L (ref 3–14)
AST SERPL W P-5'-P-CCNC: 17 U/L (ref 0–45)
BASOPHILS # BLD AUTO: 0 10E9/L (ref 0–0.2)
BASOPHILS NFR BLD AUTO: 0.3 %
BILIRUB SERPL-MCNC: 0.4 MG/DL (ref 0.2–1.3)
BUN SERPL-MCNC: 15 MG/DL (ref 7–30)
CALCIUM SERPL-MCNC: 9 MG/DL (ref 8.5–10.1)
CHLORIDE SERPL-SCNC: 108 MMOL/L (ref 94–109)
CO2 SERPL-SCNC: 24 MMOL/L (ref 20–32)
CREAT SERPL-MCNC: 0.76 MG/DL (ref 0.52–1.04)
DIFFERENTIAL METHOD BLD: ABNORMAL
EOSINOPHIL # BLD AUTO: 0 10E9/L (ref 0–0.7)
EOSINOPHIL NFR BLD AUTO: 0.4 %
ERYTHROCYTE [DISTWIDTH] IN BLOOD BY AUTOMATED COUNT: 14.1 % (ref 10–15)
GFR SERPL CREATININE-BSD FRML MDRD: >90 ML/MIN/1.7M2
GLUCOSE SERPL-MCNC: 104 MG/DL (ref 70–99)
HCG SERPL QL: NEGATIVE
HCT VFR BLD AUTO: 37 % (ref 35–47)
HGB BLD-MCNC: 12.7 G/DL (ref 11.7–15.7)
IMM GRANULOCYTES # BLD: 0 10E9/L (ref 0–0.4)
IMM GRANULOCYTES NFR BLD: 0.1 %
LIPASE SERPL-CCNC: 165 U/L (ref 73–393)
LYMPHOCYTES # BLD AUTO: 1.5 10E9/L (ref 0.8–5.3)
LYMPHOCYTES NFR BLD AUTO: 19.3 %
MCH RBC QN AUTO: 26.2 PG (ref 26.5–33)
MCHC RBC AUTO-ENTMCNC: 34.3 G/DL (ref 31.5–36.5)
MCV RBC AUTO: 76 FL (ref 78–100)
MONOCYTES # BLD AUTO: 0.5 10E9/L (ref 0–1.3)
MONOCYTES NFR BLD AUTO: 6.3 %
NEUTROPHILS # BLD AUTO: 5.9 10E9/L (ref 1.6–8.3)
NEUTROPHILS NFR BLD AUTO: 73.6 %
NRBC # BLD AUTO: 0 10*3/UL
NRBC BLD AUTO-RTO: 0 /100
PLATELET # BLD AUTO: 200 10E9/L (ref 150–450)
POTASSIUM SERPL-SCNC: 3.9 MMOL/L (ref 3.4–5.3)
PROT SERPL-MCNC: 7.4 G/DL (ref 6.8–8.8)
RBC # BLD AUTO: 4.84 10E12/L (ref 3.8–5.2)
SODIUM SERPL-SCNC: 140 MMOL/L (ref 133–144)
WBC # BLD AUTO: 8 10E9/L (ref 4–11)

## 2018-03-06 PROCEDURE — 80053 COMPREHEN METABOLIC PANEL: CPT | Performed by: EMERGENCY MEDICINE

## 2018-03-06 PROCEDURE — 83690 ASSAY OF LIPASE: CPT | Performed by: EMERGENCY MEDICINE

## 2018-03-06 PROCEDURE — 96374 THER/PROPH/DIAG INJ IV PUSH: CPT

## 2018-03-06 PROCEDURE — 96361 HYDRATE IV INFUSION ADD-ON: CPT

## 2018-03-06 PROCEDURE — 96375 TX/PRO/DX INJ NEW DRUG ADDON: CPT

## 2018-03-06 PROCEDURE — 25000128 H RX IP 250 OP 636: Performed by: EMERGENCY MEDICINE

## 2018-03-06 PROCEDURE — 25000132 ZZH RX MED GY IP 250 OP 250 PS 637: Performed by: EMERGENCY MEDICINE

## 2018-03-06 PROCEDURE — 99284 EMERGENCY DEPT VISIT MOD MDM: CPT | Mod: 25

## 2018-03-06 PROCEDURE — 84703 CHORIONIC GONADOTROPIN ASSAY: CPT | Performed by: EMERGENCY MEDICINE

## 2018-03-06 PROCEDURE — 25000125 ZZHC RX 250: Performed by: EMERGENCY MEDICINE

## 2018-03-06 PROCEDURE — 85025 COMPLETE CBC W/AUTO DIFF WBC: CPT | Performed by: EMERGENCY MEDICINE

## 2018-03-06 RX ORDER — METOCLOPRAMIDE HYDROCHLORIDE 5 MG/ML
10 INJECTION INTRAMUSCULAR; INTRAVENOUS ONCE
Status: COMPLETED | OUTPATIENT
Start: 2018-03-06 | End: 2018-03-06

## 2018-03-06 RX ORDER — SODIUM CHLORIDE 9 MG/ML
1000 INJECTION, SOLUTION INTRAVENOUS CONTINUOUS
Status: DISCONTINUED | OUTPATIENT
Start: 2018-03-06 | End: 2018-03-07 | Stop reason: HOSPADM

## 2018-03-06 RX ORDER — KETOROLAC TROMETHAMINE 30 MG/ML
15 INJECTION, SOLUTION INTRAMUSCULAR; INTRAVENOUS ONCE
Status: COMPLETED | OUTPATIENT
Start: 2018-03-06 | End: 2018-03-06

## 2018-03-06 RX ORDER — DIPHENHYDRAMINE HYDROCHLORIDE 50 MG/ML
25 INJECTION INTRAMUSCULAR; INTRAVENOUS ONCE
Status: COMPLETED | OUTPATIENT
Start: 2018-03-06 | End: 2018-03-06

## 2018-03-06 RX ADMIN — LIDOCAINE HYDROCHLORIDE 30 ML: 20 SOLUTION ORAL; TOPICAL at 22:58

## 2018-03-06 RX ADMIN — SODIUM CHLORIDE 1000 ML: 9 INJECTION, SOLUTION INTRAVENOUS at 22:58

## 2018-03-06 RX ADMIN — METOCLOPRAMIDE 10 MG: 5 INJECTION, SOLUTION INTRAMUSCULAR; INTRAVENOUS at 23:01

## 2018-03-06 RX ADMIN — DIPHENHYDRAMINE HYDROCHLORIDE 25 MG: 50 INJECTION, SOLUTION INTRAMUSCULAR; INTRAVENOUS at 23:03

## 2018-03-06 RX ADMIN — KETOROLAC TROMETHAMINE 15 MG: 30 INJECTION, SOLUTION INTRAMUSCULAR at 22:59

## 2018-03-06 ASSESSMENT — ENCOUNTER SYMPTOMS
ABDOMINAL PAIN: 1
VOMITING: 1
FREQUENCY: 0
COUGH: 1
RHINORRHEA: 0
SHORTNESS OF BREATH: 1
NAUSEA: 1
HEADACHES: 1
DIARRHEA: 0
CHEST TIGHTNESS: 1
HEMATURIA: 0
FEVER: 0
DYSURIA: 0

## 2018-03-06 NOTE — ED AVS SNAPSHOT
Emergency Department    [address] [place] [address]    Phone:  [phone]    Fax:  [phone]                                       Ary Lutz   MRN: [id number]    Department:   Emergency Department   Date of Visit:  3/6/2018           After Visit Summary Signature Page     I have received my discharge instructions, and my questions have been answered. I have discussed any challenges I see with this plan with the nurse or doctor.    ..........................................................................................................................................  Patient/Patient Representative Signature      ..........................................................................................................................................  Patient Representative Print Name and Relationship to Patient    ..................................................               ................................................  Date                                            Time    ..........................................................................................................................................  Reviewed by Signature/Title    ...................................................              ..............................................  Date                                                            Time

## 2018-03-06 NOTE — ED AVS SNAPSHOT
Emergency Department    [address] [facility] [address]    Phone:  [phone]    Fax:  [phone]                                       Ary Lutz   MRN: [id number]    Department:   Emergency Department   Date of Visit:  3/6/2018           Patient Information     Date Of Birth          1995        Your diagnoses for this visit were:     Gastroesophageal reflux disease, esophagitis presence not specified     Acute gastritis, presence of bleeding unspecified, unspecified gastritis type        You were seen by Mac Mcclure MD.      Follow-up Information     Follow up with  Emergency Department.    Specialty:  EMERGENCY MEDICINE    Why:  As needed    Contact information:    [address] [facility] [address] [phone]        Follow up with Yanick Baca PA-C.    Specialty:  Physician Assistant    Why:  As needed    Contact information:    [address] [place] [address] [address]  [place] [address] [phone]          Discharge Instructions       Take the below medications as prescribed.     New Prescriptions    MAGIC MOUTHWASH (FIRST-MOUTHWASH BLM) SUSPENSION    Swish and swallow 15 mLs in mouth every 6 hours as needed (abdominal pain)     Avoid ibuprofen.    Please return to the emergency department as needed for new or worsening symptoms such as severe and uncontrollable pain, vomiting and unable to keep anything down, black bowel movements, bloody bowel movements, any other concerning symptoms.    Please follow-up with gastroenterology as previously scheduled.    Gastritis (Adult)    Gastritis is inflammation and irritation of the stomach lining. It can be present for a short time (acute) or be long lasting (chronic). Gastritis is often caused by infection with bacteria called H pylori. More than a third of people in the  have this bacteria in their bodies. In many cases, H pylori causes no problems or symptoms. In some people, though, the  infection irritates the stomach lining and causes gastritis. Other causes of stomach irritation include drinking alcohol or taking pain-relieving medicines called NSAIDs (such as aspirin or ibuprofen).   Symptoms of gastritis can include:    Abdominal pain or bloating    Loss of appetite    Nausea or vomiting    Vomiting blood or having black stools    Feeling more tired than usual  An inflamed and irritated stomach lining is more likely to develop a sore called an ulcer. To help prevent this, gastritis should be treated.  Home care  If needed, medicines may be prescribed. If you have H pylori infection, treating it will likely relieve your symptoms. Other changes can help reduce stomach irritation and help it heal.    If you have been prescribed medicines for H pylori infection, take them as directed. Take all of the medicine until it is finished or your healthcare provider tells you to stop, even if you feel better.    Your healthcare provider may recommend avoiding NSAIDs. If you take daily aspirin for your heart or other medical reasons, do not stop without talking to your healthcare provider first.    Avoid drinking alcohol.    Stop smoking. Smoking can irritate the stomach and delay healing. As much as possible, stay away from second hand smoke.  Follow-up care  Follow up with your healthcare provider, or as advised by our staff. Testing may be needed to check for inflammation or an ulcer.  When to seek medical advice  Call your healthcare provider for any of the following:    Stomach pain that gets worse or moves to the lower right abdomen (appendix area)    Chest pain that appears or gets worse, or spreads to the back, neck, shoulder, or arm    Frequent vomiting (can t keep down liquids)    Blood in the stool or vomit (red or black in color)    Feeling weak or dizzy    Fever of 100.4 F (38 C) or higher, or as directed by your healthcare provider  Date Last Reviewed: 6/22/2015 2000-2017 [email or web address] [address] [place] [id number]. All rights reserved. This information is not intended as a substitute for professional medical care. Always follow your healthcare professional's instructions.          24 Hour Appointment Hotline       To make an appointment at any [facility], call [phone] ([phone]). If you don't have a family doctor or clinic, we will help you find one. [facility] are conveniently located to serve the needs of you and your family.             Review of your medicines      START taking        Dose / Directions Last dose taken    magic mouthwash suspension (diphenhydrAMINE, lidocaine, aluminum-magnesium & simethicone) Susp compounding kit   Dose:  15 mL   Quantity:  237 mL        Swish and swallow 15 mLs in mouth every 6 hours as needed (abdominal pain)   Refills:  1          Our records show that you are taking the medicines listed below. If these are incorrect, please call your family doctor or clinic.        Dose / Directions Last dose taken    drospirenone-ethinyl estradiol 3-0.03 MG per tablet   Commonly known as:  BRANDON   Dose:  1 tablet   Quantity:  30 tablet        Take 1 tablet by mouth daily   Refills:  11        esomeprazole 40 MG CR capsule   Commonly known as:  nexIUM   Dose:  40 mg   Quantity:  90 capsule        Take 1 capsule (40 mg) by mouth every morning (before breakfast) Take 30-60 minutes before a eating.   Refills:  1        FLUoxetine 40 MG capsule   Commonly known as:  PROzac   Quantity:  90 capsule        TAKE 1 CAPSULE (40 MG) BY MOUTH DAILY   Refills:  3        hydrocortisone 25 MG Suppository   Commonly known as:  ANUSOL-HC   Dose:  25 mg   Quantity:  28 suppository        Place 1 suppository (25 mg) rectally 2 times daily   Refills:  1        hydrOXYzine 25 MG tablet   Commonly known as:  ATARAX   Quantity:  60 tablet        TAKE 1-2 TABLETS BY MOUTH FOR SEVERE ANXIETY   Refills:  0        methylPREDNISolone 4 MG tablet    Commonly known as:  MEDROL DOSEPAK   Quantity:  21 tablet        Follow package instructions   Refills:  0                Prescriptions were sent or printed at these locations (1 Prescription)                   Other Prescriptions                Printed at Department/Unit printer (1 of 1)         magic mouthwash (FIRST-MOUTHWASH BLM) suspension                Procedures and tests performed during your visit     CBC with platelets differential    Comprehensive metabolic panel    HCG QUALitative pregnancy (blood)    Lipase      Orders Needing Specimen Collection     None      Pending Results     No orders found for last 3 day(s).            Pending Culture Results     No orders found for last 3 day(s).            Pending Results Instructions     If you had any lab results that were not finalized at the time of your Discharge, you can call the ED Lab Result RN at [phone]. You will be contacted by this team for any positive Lab results or changes in treatment. The nurses are available 7 days a week from 10A to 6:30P.  You can leave a message 24 hours per day and they will return your call.        Test Results From Your Hospital Stay        3/6/2018 10:58 PM      Component Results     Component Value Ref Range & Units Status    WBC 8.0 4.0 - 11.0 10e9/L Final    RBC Count 4.84 3.8 - 5.2 10e12/L Final    Hemoglobin 12.7 11.7 - 15.7 g/dL Final    Hematocrit 37.0 35.0 - 47.0 % Final    MCV 76 (L) 78 - 100 fl Final    MCH 26.2 (L) 26.5 - 33.0 pg Final    MCHC 34.3 31.5 - 36.5 g/dL Final    RDW 14.1 10.0 - 15.0 % Final    Platelet Count 200 150 - 450 10e9/L Final    Diff Method Automated Method  Final    % Neutrophils 73.6 % Final    % Lymphocytes 19.3 % Final    % Monocytes 6.3 % Final    % Eosinophils 0.4 % Final    % Basophils 0.3 % Final    % Immature Granulocytes 0.1 % Final    Nucleated RBCs 0 0 /100 Final    Absolute Neutrophil 5.9 1.6 - 8.3 10e9/L Final    Absolute Lymphocytes 1.5 0.8 - 5.3 10e9/L Final     Absolute Monocytes 0.5 0.0 - 1.3 10e9/L Final    Absolute Eosinophils 0.0 0.0 - 0.7 10e9/L Final    Absolute Basophils 0.0 0.0 - 0.2 10e9/L Final    Abs Immature Granulocytes 0.0 0 - 0.4 10e9/L Final    Absolute Nucleated RBC 0.0  Final         3/6/2018 11:15 PM      Component Results     Component Value Ref Range & Units Status    Sodium 140 133 - 144 mmol/L Final    Potassium 3.9 3.4 - 5.3 mmol/L Final    Chloride 108 94 - 109 mmol/L Final    Carbon Dioxide 24 20 - 32 mmol/L Final    Anion Gap 8 3 - 14 mmol/L Final    Glucose 104 (H) 70 - 99 mg/dL Final    Urea Nitrogen 15 7 - 30 mg/dL Final    Creatinine 0.76 0.52 - 1.04 mg/dL Final    GFR Estimate >90 >60 mL/min/1.7m2 Final    Non  GFR Calc    GFR Estimate If Black >90 >60 mL/min/1.7m2 Final    African American GFR Calc    Calcium 9.0 8.5 - 10.1 mg/dL Final    Bilirubin Total 0.4 0.2 - 1.3 mg/dL Final    Albumin 3.8 3.4 - 5.0 g/dL Final    Protein Total 7.4 6.8 - 8.8 g/dL Final    Alkaline Phosphatase 56 40 - 150 U/L Final    ALT 17 0 - 50 U/L Final    AST 17 0 - 45 U/L Final         3/6/2018 11:13 PM      Component Results     Component Value Ref Range & Units Status    Lipase 165 73 - 393 U/L Final         3/6/2018 11:09 PM      Component Results     Component Value Ref Range & Units Status    HCG Qualitative Serum Negative NEG^Negative Final    This test is for screening purposes.  Results should be interpreted along with   the clinical picture.  Confirmation testing is available if warranted by   ordering [id number], HCG Quantitative Pregnancy.                  Clinical Quality Measure: Blood Pressure Screening     Your blood pressure was checked while you were in the emergency department today. The last reading we obtained was  BP: (!) 138/91 . Please read the guidelines below about what these numbers mean and what you should do about them.  If your systolic blood pressure (the top number) is less than 120 and your diastolic blood pressure (the  bottom number) is less than 80, then your blood pressure is normal. There is nothing more that you need to do about it.  If your systolic blood pressure (the top number) is 120-139 or your diastolic blood pressure (the bottom number) is 80-89, your blood pressure may be higher than it should be. You should have your blood pressure rechecked within a year by a primary care provider.  If your systolic blood pressure (the top number) is 140 or greater or your diastolic blood pressure (the bottom number) is 90 or greater, you may have high blood pressure. High blood pressure is treatable, but if left untreated over time it can put you at risk for heart attack, stroke, or kidney failure. You should have your blood pressure rechecked by a primary care provider within the next 4 weeks.  If your provider in the emergency department today gave you specific instructions to follow-up with your doctor or provider even sooner than that, you should follow that instruction and not wait for up to 4 weeks for your follow-up visit.        Thank you for choosing [facility]       Thank you for choosing [facility] for your care. Our goal is always to provide you with excellent care. Hearing back from our patients is one way we can continue to improve our services. Please take a few minutes to complete the written survey that you may receive in the mail after you visit with us. Thank you!        [email or web address] Information     [facility] gives you secure access to your electronic health record. If you see a primary care provider, you can also send messages to your care team and make appointments. If you have questions, please call your primary care clinic.  If you do not have a primary care provider, please call [phone] and they will assist you.        Care EveryWhere ID     This is your Care EveryWhere ID. This could be used by other organizations to access your [facility] medical records  [id number]        Equal Access to Services     JOSE KIRAN  AH: Lynda Phan, waluma luchiragadaha, qalayata kahollis julian. So [facility] [phone].    ATENCIÓN: Si habla español, tiene a spangler disposición servicios gratuitos de asistencia lingüística. Llame al [phone].    We comply with applicable federal civil rights laws and [place] laws. We do not discriminate on the basis of race, color, national origin, age, disability, sex, sexual orientation, or gender identity.            After Visit Summary       This is your record. Keep this with you and show to your community pharmacist(s) and doctor(s) at your next visit.

## 2018-03-07 VITALS
HEIGHT: 67 IN | DIASTOLIC BLOOD PRESSURE: 76 MMHG | RESPIRATION RATE: 18 BRPM | OXYGEN SATURATION: 97 % | HEART RATE: 85 BPM | TEMPERATURE: 98.6 F | WEIGHT: 130 LBS | BODY MASS INDEX: 20.4 KG/M2 | SYSTOLIC BLOOD PRESSURE: 112 MMHG

## 2018-03-07 RX ORDER — DIPHENHYDRAMINE HYDROCHLORIDE AND LIDOCAINE HYDROCHLORIDE AND ALUMINUM HYDROXIDE AND MAGNESIUM HYDRO
15 KIT EVERY 6 HOURS PRN
Qty: 237 ML | Refills: 1 | Status: SHIPPED | OUTPATIENT
Start: 2018-03-07 | End: 2018-08-20

## 2018-03-07 NOTE — DISCHARGE INSTRUCTIONS
Take the below medications as prescribed.     New Prescriptions    MAGIC MOUTHWASH (FIRST-MOUTHWASH BLM) SUSPENSION    Swish and swallow 15 mLs in mouth every 6 hours as needed (abdominal pain)     Avoid ibuprofen.    Please return to the emergency department as needed for new or worsening symptoms such as severe and uncontrollable pain, vomiting and unable to keep anything down, black bowel movements, bloody bowel movements, any other concerning symptoms.    Please follow-up with gastroenterology as previously scheduled.    Gastritis (Adult)    Gastritis is inflammation and irritation of the stomach lining. It can be present for a short time (acute) or be long lasting (chronic). Gastritis is often caused by infection with bacteria called H pylori. More than a third of people in the [place] have this bacteria in their bodies. In many cases, H pylori causes no problems or symptoms. In some people, though, the infection irritates the stomach lining and causes gastritis. Other causes of stomach irritation include drinking alcohol or taking pain-relieving medicines called NSAIDs (such as aspirin or ibuprofen).   Symptoms of gastritis can include:    Abdominal pain or bloating    Loss of appetite    Nausea or vomiting    Vomiting blood or having black stools    Feeling more tired than usual  An inflamed and irritated stomach lining is more likely to develop a sore called an ulcer. To help prevent this, gastritis should be treated.  Home care  If needed, medicines may be prescribed. If you have H pylori infection, treating it will likely relieve your symptoms. Other changes can help reduce stomach irritation and help it heal.    If you have been prescribed medicines for H pylori infection, take them as directed. Take all of the medicine until it is finished or your healthcare provider tells you to stop, even if you feel better.    Your healthcare provider may recommend avoiding NSAIDs. If you take daily aspirin for your heart  or other medical reasons, do not stop without talking to your healthcare provider first.    Avoid drinking alcohol.    Stop smoking. Smoking can irritate the stomach and delay healing. As much as possible, stay away from second hand smoke.  Follow-up care  Follow up with your healthcare provider, or as advised by our staff. Testing may be needed to check for inflammation or an ulcer.  When to seek medical advice  Call your healthcare provider for any of the following:    Stomach pain that gets worse or moves to the lower right abdomen (appendix area)    Chest pain that appears or gets worse, or spreads to the back, neck, shoulder, or arm    Frequent vomiting (can t keep down liquids)    Blood in the stool or vomit (red or black in color)    Feeling weak or dizzy    Fever of 100.4 F (38 C) or higher, or as directed by your healthcare provider  Date Last Reviewed: 6/22/2015 2000-2017 [employer]. [address] [place] [address], [place] [address]. All rights reserved. This information is not intended as a substitute for professional medical care. Always follow your healthcare professional's instructions.

## 2018-03-07 NOTE — ED PROVIDER NOTES
History     Chief Complaint:  Abdominal Pain     HPI   Ary Lutz is a 22 year old female with a history of anxiety and GERD who presents to the emergency department for evaluation of abdominal pain. The patient states that she awoke this morning with a headache, which is similar in location, quality and onset to her past migraines. She took two tablets of Excedrin migraine for these symptoms and was otherwise feeling generally well. However, approximately 5 hours ago, she developed burning upper abdominal discomfort, which has been fairly persistent since onset, with associated nausea and two episodes of vomiting.  She additionally has had some chest tightness with associated shortness of breath with this discomfort as well. Of note, the patient has a history of GERD, for which she underwent an EGD in the past, and states that her current symptoms feel somewhat similar. She attempted to take an Lisa-seltzer for her discomfort this evening, though this seemed to exacerbate her symptoms further. She additionally has had a slight dry cough as of late, though denies any recent fever, rhinorrhea, congestion, black or bloody vomit, or changes in urination or bowel movements.     Allergies:  Bactrim    Medications:    Hydrocortisone  Prozac  Nexium  Atarax  Sarah  Hydroxyzine     Past Medical History:    Panic attacks  generalized anxiety disorder  GERD    Past Surgical History:    esophagogastroduodenoscopy    Family History:    Thyroid cancer  Familial Adenomatous Polyposis     Social History:  Presents with her boyfriend.   Negative for tobacco use.  Negative for alcohol use.  PCP:Yanick Baca  Marital Status:  Single [1]    Review of Systems   Constitutional: Negative for fever.   HENT: Negative for congestion and rhinorrhea.    Respiratory: Positive for cough, chest tightness and shortness of breath.    Gastrointestinal: Positive for abdominal pain, nausea and vomiting. Negative for diarrhea.  "  Genitourinary: Negative for dysuria, frequency and hematuria.   Neurological: Positive for headaches.   All other systems reviewed and are negative.    Physical Exam   First Vitals:  BP: (!) 138/91  Pulse: 104  Temp: 98.6  F (37  C)  Resp: 18  Height: 170.2 cm (5' 7\")  Weight: 59 kg (130 lb)  SpO2: 99 %    Physical Exam  Constitutional: Well developed, nontox appearance, appears uncomfortable  Head: Atraumatic.   Mouth/Throat: Oropharynx is clear and moist.   Neck:  no stridor  Eyes: no scleral icterus  Cardiovascular: RRR, 2+ bilat radial pulses  Pulmonary/Chest: nml resp effort, Clear BS bilat  Abdominal: ND, +BS, soft, epigastric tenderness, no rebound or guarding   : no CVA tenderness bilat  Ext: Warm, well perfused, no edema  Neurological: A&O, symmetric facies, moves ext x4  Skin: Skin is warm and dry.   Psychiatric: Anxious. Thought content normal.   Nursing note and vitals reviewed.    Emergency Department Course   Laboratory:  CBC: WBC: 8.0, HGB 12.7, PLT: 200  CMP: Glucose 104 (H), o/w WNL (Creatinine: 0.76)    Lipase: 165    HCG qualitative blood: Negative     Interventions:  2258 NS 1L IV   GI Cocktail (Maalox/Mylanta and viscous Lidocaine), 30 mL suspension, PO   2259 Toradol, 15 mg, IV injection  2301 Reglan 10 mg IV  2303 Benadryl 25 mg IV    Emergency Department Course:  Nursing notes and vitals reviewed. 2237 I performed an exam of the patient as documented above.     IV inserted. Medicine administered as documented above. Blood drawn. This was sent to the lab for further testing, results above.    0004 I rechecked the patient and discussed the results of her workup thus far.     Findings and plan explained to the Patient. Patient discharged home with instructions regarding supportive care, medications, and reasons to return. The importance of close follow-up was reviewed. The patient was prescribed Magic Mouthwash.     I personally reviewed the laboratory results with the Patient and answered " all related questions prior to discharge.     Impression & Plan    Medical Decision Makin-year-old female presenting with epigastric tenderness radiating to chest    Differential diagnosis includes GERD, gastritis, esophagitis, pancreatitis, hepatitis, pain NOS, anxiety.  Symptoms are similar to the patient's previous episodes of abdominal pain for which she had an EGD last fall.  Patient also reports experiencing a typical migraine and denies any new symptoms such as vision changes, focal weakness.  Patient was given medications as noted above with significant improvement in her symptoms.  Labs ordered as noted above and unremarkable.  Patient is likely experiencing an episode of gastritis/GERD.  She has an appointment with gastroenterology in 2 days and she is maxed out on her Nexium.  She is provided a prescription for Magic mouthwash and advised to keep her GI appointment in 2 days.  This point I feel she is safe for discharge given her improvement in symptoms.  Recommendations given regarding return to the emergency department as needed for new or worsening symptoms.  Counseled on all results, disposition and diagnosis.  Pt understanding and agreeable to plan. Patient discharged in stable condition.        Diagnosis:    ICD-10-CM   1. Gastroesophageal reflux disease, esophagitis presence not specified K21.9   2. Acute gastritis, presence of bleeding unspecified, unspecified gastritis type K29.00       Disposition:  discharged to home    Discharge Medications:   Details   magic mouthwash (FIRST-MOUTHWASH BLM) suspension Swish and swallow 15 mLs in mouth every 6 hours as needed (abdominal pain), Disp-237 mL, R-1, Local Print        I, Adrianne Mesa, am serving as a scribe on 3/6/2018 at 10:37 PM to personally document services performed by Mac Mcclure MD based on my observations and the provider's statements to me.     3/6/2018    EMERGENCY DEPARTMENT       Mac Mcclure MD  18  [phone]

## 2018-03-12 ENCOUNTER — OFFICE VISIT (OUTPATIENT)
Dept: FAMILY MEDICINE | Facility: CLINIC | Age: 23
End: 2018-03-12
Payer: COMMERCIAL

## 2018-03-12 VITALS
BODY MASS INDEX: 21.46 KG/M2 | TEMPERATURE: 98.5 F | DIASTOLIC BLOOD PRESSURE: 74 MMHG | SYSTOLIC BLOOD PRESSURE: 115 MMHG | WEIGHT: 137 LBS | HEART RATE: 70 BPM | OXYGEN SATURATION: 96 %

## 2018-03-12 DIAGNOSIS — R19.7 DIARRHEA, UNSPECIFIED TYPE: Primary | ICD-10-CM

## 2018-03-12 DIAGNOSIS — R10.12 ABDOMINAL PAIN, LEFT UPPER QUADRANT: ICD-10-CM

## 2018-03-12 LAB
ALBUMIN UR-MCNC: NEGATIVE MG/DL
APPEARANCE UR: CLEAR
BASOPHILS # BLD AUTO: 0 10E9/L (ref 0–0.2)
BASOPHILS NFR BLD AUTO: 0.4 %
BILIRUB UR QL STRIP: NEGATIVE
COLOR UR AUTO: YELLOW
DIFFERENTIAL METHOD BLD: NORMAL
EOSINOPHIL # BLD AUTO: 0.1 10E9/L (ref 0–0.7)
EOSINOPHIL NFR BLD AUTO: 1.2 %
ERYTHROCYTE [DISTWIDTH] IN BLOOD BY AUTOMATED COUNT: 14.4 % (ref 10–15)
GLUCOSE UR STRIP-MCNC: NEGATIVE MG/DL
HCT VFR BLD AUTO: 38.5 % (ref 35–47)
HGB BLD-MCNC: 12.9 G/DL (ref 11.7–15.7)
HGB UR QL STRIP: NEGATIVE
KETONES UR STRIP-MCNC: NEGATIVE MG/DL
LEUKOCYTE ESTERASE UR QL STRIP: NEGATIVE
LYMPHOCYTES # BLD AUTO: 2 10E9/L (ref 0.8–5.3)
LYMPHOCYTES NFR BLD AUTO: 35.7 %
MCH RBC QN AUTO: 26.8 PG (ref 26.5–33)
MCHC RBC AUTO-ENTMCNC: 33.5 G/DL (ref 31.5–36.5)
MCV RBC AUTO: 80 FL (ref 78–100)
MONOCYTES # BLD AUTO: 0.3 10E9/L (ref 0–1.3)
MONOCYTES NFR BLD AUTO: 5.6 %
NEUTROPHILS # BLD AUTO: 3.2 10E9/L (ref 1.6–8.3)
NEUTROPHILS NFR BLD AUTO: 57.1 %
NITRATE UR QL: NEGATIVE
PH UR STRIP: 6 PH (ref 5–7)
PLATELET # BLD AUTO: 226 10E9/L (ref 150–450)
RBC # BLD AUTO: 4.82 10E12/L (ref 3.8–5.2)
SOURCE: NORMAL
SP GR UR STRIP: 1.01 (ref 1–1.03)
UROBILINOGEN UR STRIP-ACNC: 0.2 EU/DL (ref 0.2–1)
WBC # BLD AUTO: 5.7 10E9/L (ref 4–11)

## 2018-03-12 PROCEDURE — 36415 COLL VENOUS BLD VENIPUNCTURE: CPT | Performed by: NURSE PRACTITIONER

## 2018-03-12 PROCEDURE — 85025 COMPLETE CBC W/AUTO DIFF WBC: CPT | Performed by: NURSE PRACTITIONER

## 2018-03-12 PROCEDURE — 81003 URINALYSIS AUTO W/O SCOPE: CPT | Performed by: NURSE PRACTITIONER

## 2018-03-12 PROCEDURE — 99214 OFFICE O/P EST MOD 30 MIN: CPT | Performed by: NURSE PRACTITIONER

## 2018-03-12 NOTE — PATIENT INSTRUCTIONS
Push fluids  Take imodium to lessen frequency of stools  Continue nexium 40mg daily  Avoid fatty and acidic foods  Eat a bland diet for the next 2-3 days until feeling better

## 2018-03-12 NOTE — LETTER
[facility]  [address] [facility]  [address]  [place]  [address]  Tel: [phone]    March 13, 2018    Ary Lutz  [address]   [place] [address]        Dear Ms. Lutz,    Your hemoglobin is stable , Joaquina.  And your urine is negative for infection. We will see what the stool cultures show us.     If you have any further questions or problems, please contact our office.      Sincerely,    Aslhee Handley, LAURENT/rita          Enclosure: Lab Results                                          Results for orders placed or performed in visit on 03/12/18   CBC with platelets and differential   Result Value Ref Range    WBC 5.7 4.0 - 11.0 10e9/L    RBC Count 4.82 3.8 - 5.2 10e12/L    Hemoglobin 12.9 11.7 - 15.7 g/dL    Hematocrit 38.5 35.0 - 47.0 %    MCV 80 78 - 100 fl    MCH 26.8 26.5 - 33.0 pg    MCHC 33.5 31.5 - 36.5 g/dL    RDW 14.4 10.0 - 15.0 %    Platelet Count 226 150 - 450 10e9/L    Diff Method Automated Method     % Neutrophils 57.1 %    % Lymphocytes 35.7 %    % Monocytes 5.6 %    % Eosinophils 1.2 %    % Basophils 0.4 %    Absolute Neutrophil 3.2 1.6 - 8.3 10e9/L    Absolute Lymphocytes 2.0 0.8 - 5.3 10e9/L    Absolute Monocytes 0.3 0.0 - 1.3 10e9/L    Absolute Eosinophils 0.1 0.0 - 0.7 10e9/L    Absolute Basophils 0.0 0.0 - 0.2 10e9/L   *UA reflex to Microscopic and Culture ([facility] and [facility] (except [place] and [place])   Result Value Ref Range    Color Urine Yellow     Appearance Urine Clear     Glucose Urine Negative NEG^Negative mg/dL    Bilirubin Urine Negative NEG^Negative    Ketones Urine Negative NEG^Negative mg/dL    Specific Gravity Urine 1.010 1.003 - 1.035    Blood Urine Negative NEG^Negative    pH Urine 6.0 5.0 - 7.0 pH    Protein Albumin Urine Negative NEG^Negative mg/dL    Urobilinogen Urine 0.2 0.2 - 1.0 EU/dL    Nitrite Urine Negative NEG^Negative    Leukocyte Esterase Urine Negative NEG^Negative    Source Midstream Urine

## 2018-03-12 NOTE — MR AVS SNAPSHOT
After Visit Summary   3/12/2018    Ary Lutz    MRN: [id number]           Patient Information     Date Of Birth          1995        Visit Information        Provider Department      3/12/2018 2:30 PM Ashlee Handley APRN CNP [facility]        Today's Diagnoses     Diarrhea, unspecified type    -  1    Abdominal pain, left upper quadrant          Care Instructions    Push fluids  Take imodium to lessen frequency of stools  Continue nexium 40mg daily  Avoid fatty and acidic foods  Eat a bland diet for the next 2-3 days until feeling better           Follow-ups after your visit        Future tests that were ordered for you today     Open Future Orders        Priority Expected Expires Ordered    Enteric Bacteria and Virus Panel by LUPIS Stool Routine  3/12/2019 3/12/2018    Clostridium difficile Toxin B PCR Routine  4/11/2018 3/12/2018            Who to contact     If you have questions or need follow up information about today's clinic visit or your schedule please contact [facility] directly at [phone].  Normal or non-critical lab and imaging results will be communicated to you by [employer], letter or phone within 4 business days after the clinic has received the results. If you do not hear from us within 7 days, please contact the clinic through [employer] or phone. If you have a critical or abnormal lab result, we will notify you by phone as soon as possible.  Submit refill requests through [employer] or call your pharmacy and they will forward the refill request to us. Please allow 3 business days for your refill to be completed.          Additional Information About Your Visit        [employer] Information     [employer] gives you secure access to your electronic health record. If you see a primary care provider, you can also send messages to your care team and make appointments. If you have questions, please call your primary care clinic.  If you do not have a primary  care provider, please call [phone] and they will assist you.        Care EveryWhere ID     This is your Care EveryWhere ID. This could be used by other organizations to access your [place] medical records  [id number]        Your Vitals Were     Pulse Temperature Last Period Pulse Oximetry BMI (Body Mass Index)       70 98.5  F (36.9  C) (Oral) 02/20/2018 96% 21.46 kg/m2        Blood Pressure from Last 3 Encounters:   03/12/18 115/74   03/07/18 112/76   02/22/18 119/85    Weight from Last 3 Encounters:   03/12/18 137 lb (62.1 kg)   03/06/18 130 lb (59 kg)   02/22/18 133 lb (60.3 kg)              We Performed the Following     *UA reflex to Microscopic and Culture ([place] [facility] [place] [facility] (except [place] and [place])     CBC with platelets and differential          Today's Medication Changes          These changes are accurate as of 3/12/18  3:16 PM.  If you have any questions, ask your nurse or doctor.               Stop taking these medicines if you haven't already. Please contact your care team if you have questions.     methylPREDNISolone 4 MG tablet   Commonly known as:  MEDROL DOSEPAK   Stopped by:  Ashlee Handley APRN CNP                    Primary Care Provider Office Phone # Fax #    Yanick Baca PA-C [phone]       [address] [place] [address]        Equal Access to Services     [facility] AH: Hadii gill navarro hadasho Socira, waaxda luqadaha, qaybta kaalmada hollis page. So [facility] [phone].    ATENCIÓN: Si habla español, tiene a spangler disposición servicios gratuitos de asistencia lingüística. Llame al [phone].    We comply with applicable federal civil rights laws and [place] laws. We do not discriminate on the basis of race, color, national origin, age, disability, sex, sexual orientation, or gender identity.            Thank you!     Thank you for choosing [facility]  for your care. Our  goal is always to provide you with excellent care. Hearing back from our patients is one way we can continue to improve our services. Please take a few minutes to complete the written survey that you may receive in the mail after your visit with us. Thank you!             Your Updated Medication List - Protect others around you: Learn how to safely use, store and throw away your medicines at www.disposemymeds.org.          This list is accurate as of 3/12/18  3:16 PM.  Always use your most recent med list.                   Brand Name Dispense Instructions for use Diagnosis    drospirenone-ethinyl estradiol 3-0.03 MG per tablet    BRANDON    30 tablet    Take 1 tablet by mouth daily    Encounter for surveillance of contraceptive pills       esomeprazole 40 MG CR capsule    nexIUM    90 capsule    Take 1 capsule (40 mg) by mouth every morning (before breakfast) Take 30-60 minutes before a eating.    Gastroesophageal reflux disease without esophagitis       FLUoxetine 40 MG capsule    PROzac    90 capsule    TAKE 1 CAPSULE (40 MG) BY MOUTH DAILY    SCOTT (generalized anxiety disorder)       hydrocortisone 25 MG Suppository    ANUSOL-HC    28 suppository    Place 1 suppository (25 mg) rectally 2 times daily    Internal hemorrhoids       hydrOXYzine 25 MG tablet    ATARAX    60 tablet    TAKE 1-2 TABLETS BY MOUTH FOR SEVERE ANXIETY    Panic attack       magic mouthwash suspension (diphenhydrAMINE, lidocaine, aluminum-magnesium & simethicone) Susp compounding kit     237 mL    Swish and swallow 15 mLs in mouth every 6 hours as needed (abdominal pain)

## 2018-03-12 NOTE — PROGRESS NOTES
SUBJECTIVE:   Ary Lutz is a 22 year old female who presents to clinic today for the following health issues:      Diarrhea      Duration: having liquid stools after meals for the past week , has some luq pain intermittently, no blood in stool , mild nausea but no vomiting.  No blood in stools, not black    Description:       Consistency of stool: loose and green       Blood in stool: no        Number of loose stools past 24 hours: 4-5 stimulated by eating    Intensity:  moderate    Accompanying signs and symptoms:       Fever: no        Nausea/vomitting: YES- nausea mild       Abdominal pain: YES- on left side 1-3/10       Weight loss: no   Menses due in about a week ; on ocp    History (recent antibiotics or travel/ill contacts/med changes/testing done): chronic problems with intestines, both loose and constipated stools. And often left sided abdominal pain.   Has another GI evaluation coming up next week .  Dx with GERDon PPI    ABX in January for sinus infection    Has had anemia      Precipitating or alleviating factors: None    Therapies tried and outcome: Imodium AD and PeptoBismol; is on nexium chronically, eating a bland diet; heating pad for pain helps    Does not take bulking agent; is not on iron supplement but on OCP      Problem list and histories reviewed & adjusted, as indicated.  Additional history: as documented    Patient Active Problem List   Diagnosis     SCOTT (generalized anxiety disorder)     Esophageal reflux     Panic attack     Past Surgical History:   Procedure Laterality Date     ESOPHAGOSCOPY, GASTROSCOPY, DUODENOSCOPY (EGD), COMBINED N/A 9/7/2017    Procedure: COMBINED ESOPHAGOSCOPY, GASTROSCOPY, DUODENOSCOPY (EGD), BIOPSY SINGLE OR MULTIPLE;  gastroscopy;  Surgeon: Hollis Prather MD;  Location:  GI       Social History   Substance Use Topics     Smoking status: Never Smoker     Smokeless tobacco: Never Used     Alcohol use No     Family History   Problem Relation  Age of Onset     Thyroid Cancer Mother      Familial Adenomatous Polyposis (FAP) Father          Current Outpatient Prescriptions   Medication Sig Dispense Refill     magic mouthwash (FIRST-MOUTHWASH BLM) suspension Swish and swallow 15 mLs in mouth every 6 hours as needed (abdominal pain) 237 mL 1     hydrocortisone (ANUSOL-HC) 25 MG Suppository Place 1 suppository (25 mg) rectally 2 times daily 28 suppository 1     FLUoxetine (PROZAC) 40 MG capsule TAKE 1 CAPSULE (40 MG) BY MOUTH DAILY 90 capsule 3     esomeprazole (NEXIUM) 40 MG CR capsule Take 1 capsule (40 mg) by mouth every morning (before breakfast) Take 30-60 minutes before a eating. 90 capsule 1     hydrOXYzine (ATARAX) 25 MG tablet TAKE 1-2 TABLETS BY MOUTH FOR SEVERE ANXIETY 60 tablet 0     drospirenone-ethinyl estradiol (BRANDON) 3-0.03 MG per tablet Take 1 tablet by mouth daily 30 tablet 11     Allergies   Allergen Reactions     Bactrim [Sulfamethoxazole W/Trimethoprim] Rash       Reviewed and updated as needed this visit by clinical staff       Reviewed and updated as needed this visit by Provider         ROS:  Constitutional, HEENT, cardiovascular, pulmonary, gi and gu systems are negative, except as otherwise noted.    OBJECTIVE:     /74 (BP Location: Left arm, Cuff Size: Adult Regular)  Pulse 70  Temp 98.5  F (36.9  C) (Oral)  Wt 137 lb (62.1 kg)  LMP 02/20/2018  SpO2 96%  BMI 21.46 kg/m2  Body mass index is 21.46 kg/(m^2).  GENERAL: healthy, alert and no distress  EYES: Eyes grossly normal to inspection, PERRL and conjunctivae and sclerae normal  NECK: no adenopathy, no asymmetry, masses, or scars and thyroid normal to palpation  RESP: lungs clear to auscultation - no rales, rhonchi or wheezes  CV: regular rate and rhythm, normal S1 S2, no S3 or S4, no murmur, click or rub,  ABDOMEN: soft, nontender, no hepatosplenomegaly, no masses and bowel sounds normal  MS: no gross musculoskeletal defects noted, no edema    Diagnostic Test  Results:  Pending  c dif, stool cultures and cbc    ASSESSMENT/PLAN:         ICD-10-CM    1. Diarrhea, unspecified type R19.7 CBC with platelets and differential     Enteric Bacteria and Virus Panel by LUPIS Stool     Clostridium difficile Toxin B PCR   2. Abdominal pain, left upper quadrant R10.12 *UA reflex to Microscopic and Culture ([place] [facility] (except [place] and [place])       Patient Instructions   Push fluids  Take imodium to lessen frequency of stools  Continue nexium 40mg daily  Avoid fatty and acidic foods  Eat a bland diet for the next 2-3 days until feeling better       ROBE Hicks [facility]

## 2018-03-12 NOTE — NURSING NOTE
"Chief Complaint   Patient presents with     Diarrhea       Initial /74 (BP Location: Left arm, Cuff Size: Adult Regular)  Pulse 70  Temp 98.5  F (36.9  C) (Oral)  Wt 137 lb (62.1 kg)  LMP 02/20/2018  SpO2 96%  BMI 21.46 kg/m2 Estimated body mass index is 21.46 kg/(m^2) as calculated from the following:    Height as of 3/6/18: 5' 7\" (1.702 m).    Weight as of this encounter: 137 lb (62.1 kg).  Medication Reconciliation: complete     Saadia Reese MA    "

## 2018-03-13 NOTE — PROGRESS NOTES
Yoour hemoglobin is stable , Joaquina.  And your urine is negative for infection . We will see what the stool cultures show us.

## 2018-04-12 ENCOUNTER — OFFICE VISIT (OUTPATIENT)
Dept: FAMILY MEDICINE | Facility: CLINIC | Age: 23
End: 2018-04-12
Payer: COMMERCIAL

## 2018-04-12 VITALS
SYSTOLIC BLOOD PRESSURE: 108 MMHG | BODY MASS INDEX: 21.35 KG/M2 | HEIGHT: 67 IN | TEMPERATURE: 96 F | OXYGEN SATURATION: 96 % | RESPIRATION RATE: 16 BRPM | DIASTOLIC BLOOD PRESSURE: 74 MMHG | HEART RATE: 88 BPM | WEIGHT: 136 LBS

## 2018-04-12 DIAGNOSIS — Z00.00 ENCOUNTER FOR ROUTINE ADULT HEALTH EXAMINATION WITHOUT ABNORMAL FINDINGS: ICD-10-CM

## 2018-04-12 DIAGNOSIS — Z11.3 SCREENING EXAMINATION FOR VENEREAL DISEASE: Primary | ICD-10-CM

## 2018-04-12 DIAGNOSIS — K21.9 GASTROESOPHAGEAL REFLUX DISEASE WITHOUT ESOPHAGITIS: ICD-10-CM

## 2018-04-12 DIAGNOSIS — Z30.41 ENCOUNTER FOR SURVEILLANCE OF CONTRACEPTIVE PILLS: ICD-10-CM

## 2018-04-12 DIAGNOSIS — F41.1 GAD (GENERALIZED ANXIETY DISORDER): ICD-10-CM

## 2018-04-12 DIAGNOSIS — F41.0 PANIC ATTACK: ICD-10-CM

## 2018-04-12 PROCEDURE — 87491 CHLMYD TRACH DNA AMP PROBE: CPT | Performed by: PHYSICIAN ASSISTANT

## 2018-04-12 PROCEDURE — 87591 N.GONORRHOEAE DNA AMP PROB: CPT | Performed by: PHYSICIAN ASSISTANT

## 2018-04-12 PROCEDURE — 99395 PREV VISIT EST AGE 18-39: CPT | Performed by: PHYSICIAN ASSISTANT

## 2018-04-12 RX ORDER — HYDROXYZINE HYDROCHLORIDE 25 MG/1
TABLET, FILM COATED ORAL
Qty: 60 TABLET | Refills: 0 | Status: SHIPPED | OUTPATIENT
Start: 2018-04-12 | End: 2018-05-12

## 2018-04-12 RX ORDER — FLUOXETINE 40 MG/1
CAPSULE ORAL
Qty: 90 CAPSULE | Refills: 3 | Status: SHIPPED | OUTPATIENT
Start: 2018-04-12 | End: 2019-04-15

## 2018-04-12 RX ORDER — DROSPIRENONE AND ETHINYL ESTRADIOL 0.03MG-3MG
1 KIT ORAL DAILY
Qty: 30 TABLET | Refills: 11 | Status: SHIPPED | OUTPATIENT
Start: 2018-04-12 | End: 2019-04-15

## 2018-04-12 NOTE — MR AVS SNAPSHOT
After Visit Summary   4/12/2018    Ary Lutz    MRN: [id number]           Patient Information     Date Of Birth          1995        Visit Information        Provider Department      4/12/2018 7:00 AM Yanick Baca PA-C [facility]        Today's Diagnoses     Screening examination for venereal disease    -  1    Encounter for routine adult health examination without abnormal findings        Gastroesophageal reflux disease without esophagitis        SCOTT (generalized anxiety disorder)        Panic attack        Encounter for surveillance of contraceptive pills          Care Instructions      Preventive Health Recommendations  Female Ages 18 to 25     Yearly exam:     See your health care provider every year in order to  o Review health changes.   o Discuss preventive care.    o Review your medicines if your doctor has prescribed any.      You should be tested each year for STDs (sexually transmitted diseases).       After age 20, talk to your provider about how often you should have cholesterol testing.      Starting at age 21, get a Pap test every three years. If you have an abnormal result, your doctor may have you test more often.      If you are at risk for diabetes, you should have a diabetes test (fasting glucose).     Shots:     Get a flu shot each year.     Get a tetanus shot every 10 years.     Consider getting the shot (vaccine) that prevents cervical cancer (Gardasil).    Nutrition:     Eat at least 5 servings of fruits and vegetables each day.    Eat whole-grain bread, whole-wheat pasta and brown rice instead of white grains and rice.    Talk to your provider about Calcium and Vitamin D.     Lifestyle    Exercise at least 150 minutes a week each week (30 minutes a day, 5 days a week). This will help you control your weight and prevent disease.    Limit alcohol to one drink per day.    No smoking.     Wear sunscreen to prevent skin cancer.    See your  "dentist every six months for an exam and cleaning.          Follow-ups after your visit        Follow-up notes from your care team     Return in about 1 year (around 4/12/2019) for Physical Exam.      Who to contact     If you have questions or need follow up information about today's clinic visit or your schedule please contact [facility] [place] directly at [phone].  Normal or non-critical lab and imaging results will be communicated to you by [employer], letter or phone within 4 business days after the clinic has received the results. If you do not hear from us within 7 days, please contact the clinic through [employer] or phone. If you have a critical or abnormal lab result, we will notify you by phone as soon as possible.  Submit refill requests through [employer] or call your pharmacy and they will forward the refill request to us. Please allow 3 business days for your refill to be completed.          Additional Information About Your Visit        [employer] Information     [employer] gives you secure access to your electronic health record. If you see a primary care provider, you can also send messages to your care team and make appointments. If you have questions, please call your primary care clinic.  If you do not have a primary care provider, please call [phone] and they will assist you.        Care EveryWhere ID     This is your Care EveryWhere ID. This could be used by other organizations to access your [place] medical records  [id number]        Your Vitals Were     Pulse Temperature Respirations Height Last Period Pulse Oximetry    88 96  F (35.6  C) (Tympanic) 16 5' 7\" (1.702 m) 03/12/2018 (Within Days) 96%    BMI (Body Mass Index)                   21.3 kg/m2            Blood Pressure from Last 3 Encounters:   04/12/18 108/74   03/12/18 115/74   03/07/18 112/76    Weight from Last 3 Encounters:   04/12/18 136 lb (61.7 kg)   03/12/18 137 lb (62.1 kg)   03/06/18 130 lb (59 kg)              We " Performed the Following     CHLAMYDIA TRACHOMATIS PCR     NEISSERIA GONORRHOEA PCR          Today's Medication Changes          These changes are accurate as of 4/12/18  7:32 AM.  If you have any questions, ask your nurse or doctor.               These medicines have changed or have updated prescriptions.        Dose/Directions    FLUoxetine 40 MG capsule   Commonly known as:  PROzac   This may have changed:  See the new instructions.   Used for:  SCOTT (generalized anxiety disorder)   Changed by:  Yanick Baca PA-C        TAKE 1 CAPSULE (40 MG) BY MOUTH DAILY   Quantity:  90 capsule   Refills:  3       hydrOXYzine 25 MG tablet   Commonly known as:  ATARAX   This may have changed:  See the new instructions.   Used for:  Panic attack   Changed by:  Yanick Baca PA-C        TAKE 1-2 TABLETS BY MOUTH FOR SEVERE ANXIETY   Quantity:  60 tablet   Refills:  0            Where to get your medicines      These medications were sent to [place] [address] [place] [facility] [place] - [address]  [address] [facility] [address]     Phone:  [phone]     drospirenone-ethinyl estradiol 3-0.03 MG per tablet    FLUoxetine 40 MG capsule    hydrOXYzine 25 MG tablet                Primary Care Provider Office Phone # Fax #    Yanick Baca PA-C [phone]       [address]  [place] [address]        Equal Access to Services     [facility] AH: Hadii aad ku hadasho Soomaali, waaxda luqadaha, qaybta kaalmada adeegyada, hollis arroyo . So [facility] [phone].    ATENCIÓN: Si habla español, tiene a spangler disposición servicios gratuitos de asistencia lingüística. Llkandi al [phone].    We comply with applicable federal civil rights laws and [place] laws. We do not discriminate on the basis of race, color, national origin, age, disability, sex, sexual orientation, or gender identity.            Thank you!     Thank you for choosing  [facility] [place]  for your care. Our goal is always to provide you with excellent care. Hearing back from our patients is one way we can continue to improve our services. Please take a few minutes to complete the written survey that you may receive in the mail after your visit with us. Thank you!             Your Updated Medication List - Protect others around you: Learn how to safely use, store and throw away your medicines at www.disposemymeds.org.          This list is accurate as of 4/12/18  7:32 AM.  Always use your most recent med list.                   Brand Name Dispense Instructions for use Diagnosis    drospirenone-ethinyl estradiol 3-0.03 MG per tablet    BRANDON    30 tablet    Take 1 tablet by mouth daily    Encounter for surveillance of contraceptive pills       esomeprazole 40 MG CR capsule    nexIUM    90 capsule    Take 1 capsule (40 mg) by mouth every morning (before breakfast) Take 30-60 minutes before a eating.    Gastroesophageal reflux disease without esophagitis       FLUoxetine 40 MG capsule    PROzac    90 capsule    TAKE 1 CAPSULE (40 MG) BY MOUTH DAILY    SCOTT (generalized anxiety disorder)       hydrOXYzine 25 MG tablet    ATARAX    60 tablet    TAKE 1-2 TABLETS BY MOUTH FOR SEVERE ANXIETY    Panic attack       magic mouthwash suspension (diphenhydrAMINE, lidocaine, aluminum-magnesium & simethicone) Susp compounding kit     237 mL    Swish and swallow 15 mLs in mouth every 6 hours as needed (abdominal pain)

## 2018-04-12 NOTE — NURSING NOTE
"Chief Complaint   Patient presents with     Physical     Fasting       Initial /74  Pulse 88  Temp 96  F (35.6  C) (Tympanic)  Resp 16  Ht 5' 7\" (1.702 m)  Wt 136 lb (61.7 kg)  LMP 03/12/2018 (Within Days)  SpO2 96%  BMI 21.3 kg/m2 Estimated body mass index is 21.3 kg/(m^2) as calculated from the following:    Height as of this encounter: 5' 7\" (1.702 m).    Weight as of this encounter: 136 lb (61.7 kg).  Medication Reconciliation: complete    Saadia Odonnell MA  "

## 2018-04-12 NOTE — PROGRESS NOTES
SUBJECTIVE:   CC: Ary Lutz is an 22 year old woman who presents for preventive health visit.     Healthy Habits:    Do you get at least three servings of calcium containing foods daily (dairy, green leafy vegetables, etc.)? yes    Amount of exercise or daily activities, outside of work: 6 day(s) per week    Problems taking medications regularly No    Medication side effects: No    Have you had an eye exam in the past two years? yes    Do you see a dentist twice per year? yes    Do you have sleep apnea, excessive snoring or daytime drowsiness?no    Ary is a 22 year old female who presents to clinic today for her annual wellness exam. Her GERD and bowel issues are being followed closely by GI. She has started a probiotic that she says has helped with digestion and her GERD symptoms.     Today's PHQ-2 Score:   PHQ-2 ( 1999 Pfizer) 4/12/2018 1/19/2018   Q1: Little interest or pleasure in doing things 0 0   Q2: Feeling down, depressed or hopeless 0 0   PHQ-2 Score 0 0     Abuse: Current or Past(Physical, Sexual or Emotional)- No  Do you feel safe in your environment - Yes    Social History   Substance Use Topics     Smoking status: Never Smoker     Smokeless tobacco: Never Used     Alcohol use No     If you drink alcohol do you typically have >3 drinks per day or >7 drinks per week? No                     Reviewed orders with patient.  Reviewed health maintenance and updated orders accordingly - Yes      History of abnormal Pap smear: NO - age 21-29 PAP every 3 years recommended    Reviewed and updated as needed this visit by clinical staff  Tobacco  Allergies  Meds       Reviewed and updated as needed this visit by Provider        No past medical history on file.   Past Surgical History:   Procedure Laterality Date     ESOPHAGOSCOPY, GASTROSCOPY, DUODENOSCOPY (EGD), COMBINED N/A 9/7/2017    Procedure: COMBINED ESOPHAGOSCOPY, GASTROSCOPY, DUODENOSCOPY (EGD), BIOPSY SINGLE OR MULTIPLE;  gastroscopy;   "Surgeon: Hollis Prather MD;  Location:  GI       ROS:  C: NEGATIVE for fever, chills, change in weight  I: NEGATIVE for worrisome rashes, moles or lesions  E: NEGATIVE for vision changes or irritation  ENT: NEGATIVE for ear, mouth and throat problems  R: NEGATIVE for significant cough or SOB  B: NEGATIVE for masses, tenderness or discharge  CV: NEGATIVE for chest pain, palpitations or peripheral edema  GI: Continued GERD and diarrhea, see below  : NEGATIVE for unusual urinary or vaginal symptoms. Periods are regular.  M: NEGATIVE for significant arthralgias or myalgia  N: NEGATIVE for weakness, dizziness or paresthesias  P: NEGATIVE for changes in mood or affect    OBJECTIVE:   /74  Pulse 88  Temp 96  F (35.6  C) (Tympanic)  Resp 16  Ht 5' 7\" (1.702 m)  Wt 136 lb (61.7 kg)  LMP 03/12/2018 (Within Days)  SpO2 96%  BMI 21.3 kg/m2  EXAM:  GENERAL: healthy, alert and no distress  EYES: Eyes grossly normal to inspection, PERRL and conjunctivae and sclerae normal  HENT: ear canals and TM's normal, nose and mouth without ulcers or lesions  NECK: no adenopathy, no asymmetry, masses, or scars and thyroid normal to palpation  RESP: lungs clear to auscultation - no rales, rhonchi or wheezes  BREAST: normal without masses, tenderness or nipple discharge and no palpable axillary masses or adenopathy  CV: regular rate and rhythm, normal S1 S2, no S3 or S4, no murmur, click or rub  ABDOMEN: mild epigastric pain with palpation; soft, no hepatosplenomegaly, no masses and bowel sounds normal  MS: no gross musculoskeletal defects noted, no edema  NEURO: brachial and patellar reflexes 3+  SKIN: no suspicious lesions or rashes  PSYCH: mentation appears normal, affect normal/bright    ASSESSMENT/PLAN:   1. Encounter for routine adult health examination without abnormal findings  Ary was well with no additional concerns.    2. Screening examination for venereal disease  - NEISSERIA GONORRHOEA PCR  - " "CHLAMYDIA TRACHOMATIS PCR    3. Gastroesophageal reflux disease without esophagitis  Continuing to follow with GI    4. SCOTT (generalized anxiety disorder)  Patient reports being well controlled with intermittent use of Atarax for panic.  - FLUoxetine (PROZAC) 40 MG capsule; TAKE 1 CAPSULE (40 MG) BY MOUTH DAILY  Dispense: 90 capsule; Refill: 3    5. Panic attack  See SCOTT above.  - hydrOXYzine (ATARAX) 25 MG tablet; TAKE 1-2 TABLETS BY MOUTH FOR SEVERE ANXIETY  Dispense: 60 tablet; Refill: 0    6. Encounter for surveillance of contraceptive pills  No complaints of side effects. Taking as directed.  - drospirenone-ethinyl estradiol (BRANDON) 3-0.03 MG per tablet; Take 1 tablet by mouth daily  Dispense: 30 tablet; Refill: 11    COUNSELING:   Reviewed preventive health counseling, as reflected in patient instructions       Regular exercise       Healthy diet/nutrition         reports that she has never smoked. She has never used smokeless tobacco.    Estimated body mass index is 21.3 kg/(m^2) as calculated from the following:    Height as of this encounter: 5' 7\" (1.702 m).    Weight as of this encounter: 136 lb (61.7 kg).       Counseling Resources:  ATP IV Guidelines  Pooled Cohorts Equation Calculator  Breast Cancer Risk Calculator  FRAX Risk Assessment  ICSI Preventive Guidelines  Dietary Guidelines for Americans, 2010  USDA's MyPlate  ASA Prophylaxis  Lung CA Screening    Yanick Baca PA-C  [facility] [place]  "

## 2018-04-13 LAB
C TRACH DNA SPEC QL NAA+PROBE: NEGATIVE
N GONORRHOEA DNA SPEC QL NAA+PROBE: NEGATIVE
SPECIMEN SOURCE: NORMAL
SPECIMEN SOURCE: NORMAL

## 2018-04-13 NOTE — PROGRESS NOTES
Ary-  Here are your recent results.       -Chlamydia and gonnohrea tests are normal.    If you have any questions please do not hesitate to contact our office via phone ([phone]) or you may send me a message via [employer] by clicking the contact my Care Team link.      It was a pleasure participating in your care!    Thank you,    Yanick Baca MPH, PA-C  [address] [place] [address] [phone]

## 2018-04-21 DIAGNOSIS — K21.9 GASTROESOPHAGEAL REFLUX DISEASE WITHOUT ESOPHAGITIS: ICD-10-CM

## 2018-04-23 RX ORDER — PANTOPRAZOLE SODIUM 40 MG/1
TABLET, DELAYED RELEASE ORAL
Qty: 90 TABLET | Refills: 1 | Status: SHIPPED | OUTPATIENT
Start: 2018-04-23 | End: 2018-10-23

## 2018-04-23 NOTE — TELEPHONE ENCOUNTER
"Requested Prescriptions   Pending Prescriptions Disp Refills     pantoprazole (PROTONIX) 40 MG EC tablet [Pharmacy Med Name: PANTOPRAZOLE SOD DR 40 MG TAB] 90 tablet 1     Sig: TAKE 1 TABLET BY MOUTH DAILY    PPI Protocol Passed    4/21/2018 10:06 AM       Passed - Not on Clopidogrel (unless Pantoprazole ordered)       Passed - No diagnosis of osteoporosis on record       Passed - Recent (12 mo) or future (30 days) visit within the authorizing provider's specialty    Patient had office visit in the last 12 months or has a visit in the next 30 days with authorizing provider or within the authorizing provider's specialty.  See \"Patient Info\" tab in inbasket, or \"Choose Columns\" in Meds & Orders section of the refill encounter.           Passed - Patient is age 18 or older       Passed - No active pregnacy on record       Passed - No positive pregnancy test in past 12 months   Last Written Prescription Date:  11/28/2016   Last Fill Quantity: ,  # refills:  0  Last office visit: 4/12/2018 with prescribing provider:   4/12/2018   Future Office Visit:           "

## 2018-05-12 DIAGNOSIS — F41.0 PANIC ATTACK: ICD-10-CM

## 2018-05-14 RX ORDER — HYDROXYZINE HYDROCHLORIDE 25 MG/1
TABLET, FILM COATED ORAL
Qty: 60 TABLET | Refills: 0 | Status: SHIPPED | OUTPATIENT
Start: 2018-05-14 | End: 2019-05-06

## 2018-05-14 NOTE — TELEPHONE ENCOUNTER
"Requested Prescriptions   Pending Prescriptions Disp Refills     hydrOXYzine (ATARAX) 25 MG tablet [Pharmacy Med Name: HYDROXYZINE HCL 25 MG TABLET]  Last Written Prescription Date:  4/12/18  Last Fill Quantity: 60,  # refills: 0   Last office visit: 4/12/2018 with prescribing provider:  Keven   Future Office Visit:     60 tablet 0     Sig: TAKE 1-2 TABLETS BY MOUTH DAILY FOR SEVERE ANXIETY    Antihistamines Protocol Passed    5/12/2018  6:53 AM       Passed - Recent (12 mo) or future (30 days) visit within the authorizing provider's specialty    Patient had office visit in the last 12 months or has a visit in the next 30 days with authorizing provider or within the authorizing provider's specialty.  See \"Patient Info\" tab in inbasket, or \"Choose Columns\" in Meds & Orders section of the refill encounter.           Passed - Patient is age 3 or older    Apply age and/or weight-based dosing for peds patients age 3 and older.    Forward request to provider for patients under the age of 3.            "

## 2018-05-14 NOTE — TELEPHONE ENCOUNTER
Refill approved through [facility] protocol.  Vernell Blake RN  [facility]  [phone]

## 2018-05-31 ENCOUNTER — OFFICE VISIT (OUTPATIENT)
Dept: FAMILY MEDICINE | Facility: CLINIC | Age: 23
End: 2018-05-31
Payer: COMMERCIAL

## 2018-05-31 VITALS
SYSTOLIC BLOOD PRESSURE: 107 MMHG | OXYGEN SATURATION: 97 % | WEIGHT: 139.4 LBS | RESPIRATION RATE: 14 BRPM | DIASTOLIC BLOOD PRESSURE: 70 MMHG | BODY MASS INDEX: 21.88 KG/M2 | HEIGHT: 67 IN | HEART RATE: 76 BPM | TEMPERATURE: 98.5 F

## 2018-05-31 DIAGNOSIS — K21.9 GASTROESOPHAGEAL REFLUX DISEASE WITHOUT ESOPHAGITIS: Primary | ICD-10-CM

## 2018-05-31 DIAGNOSIS — R19.7 DIARRHEA, UNSPECIFIED TYPE: ICD-10-CM

## 2018-05-31 PROCEDURE — 99213 OFFICE O/P EST LOW 20 MIN: CPT | Performed by: PHYSICIAN ASSISTANT

## 2018-05-31 ASSESSMENT — ANXIETY QUESTIONNAIRES
IF YOU CHECKED OFF ANY PROBLEMS ON THIS QUESTIONNAIRE, HOW DIFFICULT HAVE THESE PROBLEMS MADE IT FOR YOU TO DO YOUR WORK, TAKE CARE OF THINGS AT HOME, OR GET ALONG WITH OTHER PEOPLE: VERY DIFFICULT
3. WORRYING TOO MUCH ABOUT DIFFERENT THINGS: SEVERAL DAYS
6. BECOMING EASILY ANNOYED OR IRRITABLE: MORE THAN HALF THE DAYS
1. FEELING NERVOUS, ANXIOUS, OR ON EDGE: MORE THAN HALF THE DAYS
7. FEELING AFRAID AS IF SOMETHING AWFUL MIGHT HAPPEN: MORE THAN HALF THE DAYS
5. BEING SO RESTLESS THAT IT IS HARD TO SIT STILL: SEVERAL DAYS
2. NOT BEING ABLE TO STOP OR CONTROL WORRYING: MORE THAN HALF THE DAYS
GAD7 TOTAL SCORE: 12

## 2018-05-31 ASSESSMENT — PATIENT HEALTH QUESTIONNAIRE - PHQ9: 5. POOR APPETITE OR OVEREATING: MORE THAN HALF THE DAYS

## 2018-05-31 NOTE — PROGRESS NOTES
SUBJECTIVE:   Ary Lutz is a 22 year old female who presents to clinic today for the following health issues:    ABDOMINAL PAIN     Onset:  2 months    Description: Pt would like a new GI referral  Character: Sharp, Dull ache, Stabbing, Gnawing, Burning, Fullness and Cramping  Location: left lower quadrant, epigastrium  Radiation: None     Intensity: moderate    Progression of Symptoms:  same    Accompanying Signs & Symptoms:  Fever/Chills?: no   Gas/Bloating: YES  Nausea: YES  Vomitting: NO  Diarrhea?: YES, intermittent x 2 months, loose stools  Constipation:no   Dysuria or Hematuria: no    History:   Trauma: no   Previous similar pain: YES   Previous tests done: Upper Endoscopy  1 year ago, showed non specific irritation of stomach    Precipitating factors:   Does the pain change with:     Food: YES- not really     BM: YES- sometime    Urination: no     Alleviating factors:  nothing    Therapies Tried and outcome: Tums, magic mouthwash, and some antacids.     LMP:  05/01/18 approximately       Ary has been experiencing an increase in epigastric bloating, burning and discomfort as well as some LLQ bloating over the past 2 months associated with intermittent loose stools and nausea, was previously seen by GI, would like a new referral to GI today for a second opinion  She is currently taking protonix and a probiotic without full relief.    Problem list and histories reviewed & adjusted, as indicated.  Additional history: as documented    Patient Active Problem List   Diagnosis     SCOTT (generalized anxiety disorder)     Esophageal reflux     Panic attack     Past Surgical History:   Procedure Laterality Date     ESOPHAGOSCOPY, GASTROSCOPY, DUODENOSCOPY (EGD), COMBINED N/A 9/7/2017    Procedure: COMBINED ESOPHAGOSCOPY, GASTROSCOPY, DUODENOSCOPY (EGD), BIOPSY SINGLE OR MULTIPLE;  gastroscopy;  Surgeon: Hollis Prather MD;  Location: [facility]       Social History   Substance Use Topics     Smoking  "status: Never Smoker     Smokeless tobacco: Never Used     Alcohol use No     Family History   Problem Relation Age of Onset     Thyroid Cancer Mother      Familial Adenomatous Polyposis (FAP) Father          Current Outpatient Prescriptions   Medication Sig Dispense Refill     drospirenone-ethinyl estradiol (BRANDON) 3-0.03 MG per tablet Take 1 tablet by mouth daily 30 tablet 11     esomeprazole (NEXIUM) 40 MG CR capsule Take 1 capsule (40 mg) by mouth every morning (before breakfast) Take 30-60 minutes before a eating. 90 capsule 1     FLUoxetine (PROZAC) 40 MG capsule TAKE 1 CAPSULE (40 MG) BY MOUTH DAILY 90 capsule 3     hydrOXYzine (ATARAX) 25 MG tablet TAKE 1-2 TABLETS BY MOUTH DAILY FOR SEVERE ANXIETY 60 tablet 0     magic mouthwash (FIRST-MOUTHWASH BLM) suspension Swish and swallow 15 mLs in mouth every 6 hours as needed (abdominal pain) 237 mL 1     pantoprazole (PROTONIX) 40 MG EC tablet TAKE 1 TABLET BY MOUTH DAILY (Patient not taking: Reported on 5/31/2018) 90 tablet 1     Allergies   Allergen Reactions     Bactrim [Sulfamethoxazole W/Trimethoprim] Rash       Reviewed and updated as needed this visit by clinical staff       Reviewed and updated as needed this visit by Provider         ROS:  Constitutional, HEENT, cardiovascular, pulmonary, gi and gu systems are negative, except as otherwise noted.    OBJECTIVE:     /70  Pulse 76  Temp 98.5  F (36.9  C) (Tympanic)  Resp 14  Ht 5' 7\" (1.702 m)  Wt 139 lb 6.4 oz (63.2 kg)  LMP 05/01/2018 (Within Days)  SpO2 97%  BMI 21.83 kg/m2  Body mass index is 21.83 kg/(m^2).  GENERAL: healthy, alert and no distress  RESP: lungs clear to auscultation - no rales, rhonchi or wheezes  CV: regular rate and rhythm, normal S1 S2, no S3 or S4  ABDOMEN: soft, nontender, no hepatosplenomegaly, no masses and bowel sounds normal    Diagnostic Test Results:  none     ASSESSMENT/PLAN:       (K21.9) Gastroesophageal reflux disease without esophagitis  (primary encounter " diagnosis)  Comment: Symptoms seem consistent with GERD and possible IBS.  She will continue current regimen and follow up with GI as requested for further assessment.  Plan: GASTROENTEROLOGY ADULT REF CONSULT ONLY            (R19.7) Diarrhea, unspecified type  Comment:  Plan: see above    See Patient Instructions    Yanick Baca PA-C  [facility]

## 2018-05-31 NOTE — MR AVS SNAPSHOT
After Visit Summary   5/31/2018    Ary Lutz    MRN: [id number]           Patient Information     Date Of Birth          1995        Visit Information        Provider Department      5/31/2018 5:00 PM Yanick Baca PA-C [facility] [place]        Today's Diagnoses     Gastroesophageal reflux disease without esophagitis    -  1    Diarrhea, unspecified type           Follow-ups after your visit        Additional Services     GASTROENTEROLOGY ADULT REF CONSULT ONLY       Preferred Location: [place] [facility] [phone]      Please be aware that coverage of these services is subject to the terms and limitations of your health insurance plan.  Call member services at your health plan with any benefit or coverage questions.  Any procedures must be performed at a [facility] facility OR coordinated by your clinic's referral office.    Please bring the following with you to your appointment:    (1) Any X-Rays, CTs or MRIs which have been performed.  Contact the facility where they were done to arrange for  prior to your scheduled appointment.    (2) List of current medications   (3) This referral request   (4) Any documents/labs given to you for this referral                  Follow-up notes from your care team     Return in about 1 week (around 6/7/2018).      Who to contact     If you have questions or need follow up information about today's clinic visit or your schedule please contact [facility] [place] directly at [phone].  Normal or non-critical lab and imaging results will be communicated to you by MyChart, letter or phone within 4 business days after the clinic has received the results. If you do not hear from us within 7 days, please contact the clinic through MyChart or phone. If you have a critical or abnormal lab result, we will notify you by phone as soon as possible.  Submit refill requests through [employer] or call your pharmacy and they will forward  "the refill request to us. Please allow 3 business days for your refill to be completed.          Additional Information About Your Visit        [employer]TRUEhart Information     "[employer] gives you secure access to your electronic health record. If you see a primary care provider, you can also send messages to your care team and make appointments. If you have questions, please call your primary care clinic.  If you do not have a primary care provider, please call [phone] and they will assist you.        Care EveryWhere ID     This is your Care EveryWhere ID. This could be used by other organizations to access your [facility] medical records  [id number]        Your Vitals Were     Pulse Temperature Respirations Height Last Period Pulse Oximetry    76 98.5  F (36.9  C) (Tympanic) 14 5' 7\" (1.702 m) 05/01/2018 (Within Days) 97%    BMI (Body Mass Index)                   21.83 kg/m2            Blood Pressure from Last 3 Encounters:   05/31/18 107/70   04/12/18 108/74   03/12/18 115/74    Weight from Last 3 Encounters:   05/31/18 139 lb 6.4 oz (63.2 kg)   04/12/18 136 lb (61.7 kg)   03/12/18 137 lb (62.1 kg)              We Performed the Following     GASTROENTEROLOGY ADULT REF CONSULT ONLY        Primary Care Provider Office Phone # Fax #    Yanick Baca PA-C [phone]       [address] [place] [facility] [address]        Equal Access to Services     [facility]: Hadii aad ku hadasho Soomaali, waaxda luqadaha, qaybta kaalmada adeegyada, waxay priti cramer ademarcelo perez'giuliana . So [facility] [phone].    ATENCIÓN: Si habla español, tiene a spangler disposición servicios gratuitos de asistencia lingüística. Emma al [phone].    We comply with applicable federal civil rights laws and [place] laws. We do not discriminate on the basis of race, color, national origin, age, disability, sex, sexual orientation, or gender identity.            Thank you!     Thank you for choosing [facility] [place]  for your care. Our goal is always to provide you with excellent care. Hearing back from our patients is one way we can continue to improve our services. Please take a few minutes to complete the written survey that you may receive in the mail after your visit with us. Thank you!             Your Updated Medication List - Protect others around you: Learn how to safely use, store and throw away your medicines at www.disposemymeds.org.          This list is accurate as of 5/31/18  5:46 PM.  Always use your most recent med list.                   Brand Name Dispense Instructions for use Diagnosis    drospirenone-ethinyl estradiol 3-0.03 MG per tablet    BRANDON    30 tablet    Take 1 tablet by mouth daily    Encounter for surveillance of contraceptive pills       esomeprazole 40 MG CR capsule    nexIUM    90 capsule    Take 1 capsule (40 mg) by mouth every morning (before breakfast) Take 30-60 minutes before a eating.    Gastroesophageal reflux disease without esophagitis       FLUoxetine 40 MG capsule    PROzac    90 capsule    TAKE 1 CAPSULE (40 MG) BY MOUTH DAILY    SCOTT (generalized anxiety disorder)       hydrOXYzine 25 MG tablet    ATARAX    60 tablet    TAKE 1-2 TABLETS BY MOUTH DAILY FOR SEVERE ANXIETY    Panic attack       magic mouthwash suspension (diphenhydrAMINE, lidocaine, aluminum-magnesium & simethicone) Susp compounding kit     237 mL    Swish and swallow 15 mLs in mouth every 6 hours as needed (abdominal pain)        pantoprazole 40 MG EC tablet    PROTONIX    90 tablet    TAKE 1 TABLET BY MOUTH DAILY    Gastroesophageal reflux disease without esophagitis

## 2018-06-01 ASSESSMENT — PATIENT HEALTH QUESTIONNAIRE - PHQ9: SUM OF ALL RESPONSES TO PHQ QUESTIONS 1-9: 2

## 2018-06-01 ASSESSMENT — ANXIETY QUESTIONNAIRES: GAD7 TOTAL SCORE: 12

## 2018-07-11 ENCOUNTER — TRANSFERRED RECORDS (OUTPATIENT)
Dept: HEALTH INFORMATION MANAGEMENT | Facility: CLINIC | Age: 23
End: 2018-07-11

## 2018-07-30 ENCOUNTER — OFFICE VISIT (OUTPATIENT)
Dept: FAMILY MEDICINE | Facility: CLINIC | Age: 23
End: 2018-07-30
Payer: COMMERCIAL

## 2018-07-30 VITALS
RESPIRATION RATE: 16 BRPM | HEIGHT: 67 IN | DIASTOLIC BLOOD PRESSURE: 70 MMHG | TEMPERATURE: 98 F | BODY MASS INDEX: 21.97 KG/M2 | WEIGHT: 140 LBS | HEART RATE: 88 BPM | SYSTOLIC BLOOD PRESSURE: 110 MMHG

## 2018-07-30 DIAGNOSIS — J06.9 VIRAL UPPER RESPIRATORY TRACT INFECTION: Primary | ICD-10-CM

## 2018-07-30 DIAGNOSIS — R30.0 DYSURIA: ICD-10-CM

## 2018-07-30 DIAGNOSIS — N92.6 ABNORMAL BLEEDING IN MENSTRUAL CYCLE: ICD-10-CM

## 2018-07-30 DIAGNOSIS — S50.311D: ICD-10-CM

## 2018-07-30 LAB
ALBUMIN UR-MCNC: NEGATIVE MG/DL
APPEARANCE UR: CLEAR
BILIRUB UR QL STRIP: NEGATIVE
COLOR UR AUTO: YELLOW
GLUCOSE UR STRIP-MCNC: NEGATIVE MG/DL
HGB UR QL STRIP: ABNORMAL
KETONES UR STRIP-MCNC: NEGATIVE MG/DL
LEUKOCYTE ESTERASE UR QL STRIP: NEGATIVE
NITRATE UR QL: NEGATIVE
NON-SQ EPI CELLS #/AREA URNS LPF: NORMAL /LPF
PH UR STRIP: 5.5 PH (ref 5–7)
RBC #/AREA URNS AUTO: NORMAL /HPF
SOURCE: ABNORMAL
SP GR UR STRIP: 1.01 (ref 1–1.03)
UROBILINOGEN UR STRIP-ACNC: 0.2 EU/DL (ref 0.2–1)
WBC #/AREA URNS AUTO: NORMAL /HPF

## 2018-07-30 PROCEDURE — 81001 URINALYSIS AUTO W/SCOPE: CPT | Performed by: PHYSICIAN ASSISTANT

## 2018-07-30 PROCEDURE — 87086 URINE CULTURE/COLONY COUNT: CPT | Performed by: PHYSICIAN ASSISTANT

## 2018-07-30 PROCEDURE — 99214 OFFICE O/P EST MOD 30 MIN: CPT | Performed by: PHYSICIAN ASSISTANT

## 2018-07-30 ASSESSMENT — ENCOUNTER SYMPTOMS
NEUROLOGICAL NEGATIVE: 1
EYES NEGATIVE: 1
RESPIRATORY NEGATIVE: 1
ROS SKIN COMMENTS: AS IN HPI
PSYCHIATRIC NEGATIVE: 1
CONSTITUTIONAL NEGATIVE: 1
CARDIOVASCULAR NEGATIVE: 1
GASTROINTESTINAL NEGATIVE: 1

## 2018-07-30 NOTE — MR AVS SNAPSHOT
After Visit Summary   7/30/2018    Ary Lutz    MRN: [id number]           Patient Information     Date Of Birth          1995        Visit Information        Provider Department      7/30/2018 2:30 PM Jackie Galloway PA-C [facility]        Today's Diagnoses     Viral upper respiratory tract infection    -  1    Elbow abrasion, right, subsequent encounter        Dysuria        Abnormal bleeding in menstrual cycle           Follow-ups after your visit        Who to contact     If you have questions or need follow up information about today's clinic visit or your schedule please contact [facility] directly at [phone].  Normal or non-critical lab and imaging results will be communicated to you by MyChart, letter or phone within 4 business days after the clinic has received the results. If you do not hear from us within 7 days, please contact the clinic through [employer] or phone. If you have a critical or abnormal lab result, we will notify you by phone as soon as possible.  Submit refill requests through [employer] or call your pharmacy and they will forward the refill request to us. Please allow 3 business days for your refill to be completed.          Additional Information About Your Visit        MyChart Information     [employer] gives you secure access to your electronic health record. If you see a primary care provider, you can also send messages to your care team and make appointments. If you have questions, please call your primary care clinic.  If you do not have a primary care provider, please call [phone] and they will assist you.        Care EveryWhere ID     This is your Care EveryWhere ID. This could be used by other organizations to access your [place] medical records  [id number]        Your Vitals Were     Pulse Temperature Respirations Height BMI (Body Mass Index)       88 98  F (36.7  C) (Tympanic)  "16 5' 7\" (1.702 m) 21.93 kg/m2        Blood Pressure from Last 3 Encounters:   07/30/18 110/70   05/31/18 107/70   04/12/18 108/74    Weight from Last 3 Encounters:   07/30/18 140 lb (63.5 kg)   05/31/18 139 lb 6.4 oz (63.2 kg)   04/12/18 136 lb (61.7 kg)              We Performed the Following     UA reflex to Microscopic     Urine Culture Aerobic Bacterial     Urine Microscopic        Primary Care Provider Office Phone # Fax #    Yanick Baca PA-C [phone]       [address] [place] [facility] [place] [address]        Equal Access to Services     [facility] : Hadii aad ku hadasho Soomaali, waaxda luqadaha, qaybta kaalmada adeegyada, waxay earlin haylizzetten shilpa arroyo . So [facility] [phone].    ATENCIÓN: Si habla español, tiene a spangler disposición servicios gratuitos de asistencia lingüística. [facility] [phone].    We comply with applicable federal civil rights laws and [place] laws. We do not discriminate on the basis of race, color, national origin, age, disability, sex, sexual orientation, or gender identity.            Thank you!     Thank you for choosing [facility]  for your care. Our goal is always to provide you with excellent care. Hearing back from our patients is one way we can continue to improve our services. Please take a few minutes to complete the written survey that you may receive in the mail after your visit with us. Thank you!             Your Updated Medication List - Protect others around you: Learn how to safely use, store and throw away your medicines at www.disposemymeds.org.          This list is accurate as of 7/30/18  4:21 PM.  Always use your most recent med list.                   Brand Name Dispense Instructions for use Diagnosis    drospirenone-ethinyl estradiol 3-0.03 MG per tablet    BRANDON    30 tablet    Take 1 tablet by mouth daily    Encounter for surveillance of contraceptive pills       esomeprazole 40 MG CR " capsule    nexIUM    90 capsule    Take 1 capsule (40 mg) by mouth every morning (before breakfast) Take 30-60 minutes before a eating.    Gastroesophageal reflux disease without esophagitis       FLUoxetine 40 MG capsule    PROzac    90 capsule    TAKE 1 CAPSULE (40 MG) BY MOUTH DAILY    SCOTT (generalized anxiety disorder)       hydrOXYzine 25 MG tablet    ATARAX    60 tablet    TAKE 1-2 TABLETS BY MOUTH DAILY FOR SEVERE ANXIETY    Panic attack       magic mouthwash suspension (diphenhydrAMINE, lidocaine, aluminum-magnesium & simethicone) Susp compounding kit     237 mL    Swish and swallow 15 mLs in mouth every 6 hours as needed (abdominal pain)        pantoprazole 40 MG EC tablet    PROTONIX    90 tablet    TAKE 1 TABLET BY MOUTH DAILY    Gastroesophageal reflux disease without esophagitis

## 2018-07-30 NOTE — PROGRESS NOTES
SUBJECTIVE:   Ary Lutz is a 23 year old female who presents to clinic today for the following health issues:      Sinus SYMPTOMS      Duration: 1 month    Description  facial pain/pressure and fever    Severity: mild    Accompanying signs and symptoms: None    History (predisposing factors):  none    Precipitating or alleviating factors: None    Therapies tried and outcome:  oral decongestant- not effective    Took doxycycline for elbow infection - this was due to an abrasion over the right elbow - this is much better.  She had no pain with elbow motion.   Low grade fever yesterday   No body aches  History of sinus problems - today she had some sinus pressure   Spotting - brown discharge  Dysuria   She had unprotected sex while taking doxycycline and OCP    Problem list and histories reviewed & adjusted, as indicated.  Additional history: as documented    Patient Active Problem List   Diagnosis     SCOTT (generalized anxiety disorder)     Esophageal reflux     Panic attack     Past Surgical History:   Procedure Laterality Date     ESOPHAGOSCOPY, GASTROSCOPY, DUODENOSCOPY (EGD), COMBINED N/A 9/7/2017    Procedure: COMBINED ESOPHAGOSCOPY, GASTROSCOPY, DUODENOSCOPY (EGD), BIOPSY SINGLE OR MULTIPLE;  gastroscopy;  Surgeon: Hollis Prather MD;  Location:  GI       Social History   Substance Use Topics     Smoking status: Never Smoker     Smokeless tobacco: Never Used     Alcohol use No     Family History   Problem Relation Age of Onset     Thyroid Cancer Mother      Familial Adenomatous Polyposis (FAP) Father          Current Outpatient Prescriptions   Medication Sig Dispense Refill     drospirenone-ethinyl estradiol (BRANDON) 3-0.03 MG per tablet Take 1 tablet by mouth daily 30 tablet 11     esomeprazole (NEXIUM) 40 MG CR capsule Take 1 capsule (40 mg) by mouth every morning (before breakfast) Take 30-60 minutes before a eating. 90 capsule 1     FLUoxetine (PROZAC) 40 MG capsule TAKE 1 CAPSULE (40 MG)  "BY MOUTH DAILY 90 capsule 3     hydrOXYzine (ATARAX) 25 MG tablet TAKE 1-2 TABLETS BY MOUTH DAILY FOR SEVERE ANXIETY 60 tablet 0     magic mouthwash (FIRST-MOUTHWASH BLM) suspension Swish and swallow 15 mLs in mouth every 6 hours as needed (abdominal pain) 237 mL 1     pantoprazole (PROTONIX) 40 MG EC tablet TAKE 1 TABLET BY MOUTH DAILY 90 tablet 1     Allergies   Allergen Reactions     Bactrim [Sulfamethoxazole W/Trimethoprim] Rash       Reviewed and updated as needed this visit by clinical staff  Tobacco  Meds  Med Hx  Surg Hx  Fam Hx  Soc Hx      Reviewed and updated as needed this visit by Provider         Review of Systems   Constitutional: Negative.    HENT:        As in HPI   Eyes: Negative.    Respiratory: Negative.    Cardiovascular: Negative.    Gastrointestinal: Negative.    Genitourinary:        As in HPI   Musculoskeletal:        As in HPI   Skin:        As in HPI   Neurological: Negative.    Psychiatric/Behavioral: Negative.        OBJECTIVE:     /70 (Cuff Size: Adult Regular)  Pulse 88  Temp 98  F (36.7  C) (Tympanic)  Resp 16  Ht 5' 7\" (1.702 m)  Wt 140 lb (63.5 kg)  BMI 21.93 kg/m2  Body mass index is 21.93 kg/(m^2).    Physical Exam   Constitutional: She is oriented to person, place, and time. She appears well-developed and well-nourished. No distress.   HENT:   Head: Normocephalic and atraumatic.   Right Ear: Tympanic membrane and ear canal normal.   Left Ear: Tympanic membrane and ear canal normal.   Nose: Nose normal. Right sinus exhibits no maxillary sinus tenderness and no frontal sinus tenderness. Left sinus exhibits no maxillary sinus tenderness and no frontal sinus tenderness.   Mouth/Throat: Uvula is midline, oropharynx is clear and moist and mucous membranes are normal.   Eyes: Conjunctivae and EOM are normal.   Neck: Normal range of motion.   Pulmonary/Chest: Effort normal.   Abdominal: Soft. There is tenderness in the suprapubic area. There is no rigidity, no rebound, " no guarding and no CVA tenderness.   Musculoskeletal:        Right elbow: She exhibits normal range of motion, no swelling and no effusion. No tenderness found.   Abrasion over the olecranon - healing well, no erythema, no drainage   Neurological: She is alert and oriented to person, place, and time.   Skin: Skin is warm and dry.   Psychiatric: She has a normal mood and affect. Her behavior is normal.       Results for orders placed or performed in visit on 07/30/18 (from the past 24 hour(s))   UA reflex to Microscopic   Result Value Ref Range    Color Urine Yellow     Appearance Urine Clear     Glucose Urine Negative NEG^Negative mg/dL    Bilirubin Urine Negative NEG^Negative    Ketones Urine Negative NEG^Negative mg/dL    Specific Gravity Urine 1.010 1.003 - 1.035    Blood Urine Trace (A) NEG^Negative    pH Urine 5.5 5.0 - 7.0 pH    Protein Albumin Urine Negative NEG^Negative mg/dL    Urobilinogen Urine 0.2 0.2 - 1.0 EU/dL    Nitrite Urine Negative NEG^Negative    Leukocyte Esterase Urine Negative NEG^Negative    Source Midstream Urine    Urine Microscopic   Result Value Ref Range    WBC Urine 0 - 5 OTO5^0 - 5 /HPF    RBC Urine O - 2 OTO2^O - 2 /HPF    Squamous Epithelial /LPF Urine Few FEW^Few /LPF       ASSESSMENT/PLAN:       ICD-10-CM    1. Viral upper respiratory tract infection J06.9     B97.89    2. Elbow abrasion, right, subsequent encounter S50.311D    3. Dysuria R30.0 UA reflex to Microscopic     Urine Microscopic     Urine Culture Aerobic Bacterial   4. Abnormal bleeding in menstrual cycle N93.9       MDM:  Overall, her symptoms are not concerning.   URI - treat with OTC measures as needed.  Flonase and Netti pot for the sinus symptoms.   Elbow - this looks very good today- I am not concerned about ongoing infection.   Dysuria - UA was normal, will get urine culture because her symptoms are consistent with UTI  Abnormal bleeding - not unusual with OCP, also could be releated to doxy?, if she does not get  her period next week, she will take a pregnancy test.      I attempted to call the patient twice with her UA results - no answer and voicemail is not set up.      There are no Patient Instructions on file for this visit.    Rafi Galloway PA-C  [facility]

## 2018-08-01 LAB
BACTERIA SPEC CULT: NO GROWTH
SPECIMEN SOURCE: NORMAL

## 2018-08-01 NOTE — PROGRESS NOTES
Joaquina    Your lab tests are complete and I have reviewed the results.     - Your lab results look great; everything is normal.  There was no bacterial growth on the urine culture, so no reason to treat with an antibiotic.      If you have any questions or concerns, please feel free to call or send a ReadyCart message.    Sincerely,  Rafi Galloway PA-C

## 2018-08-20 ENCOUNTER — MYC MEDICAL ADVICE (OUTPATIENT)
Dept: FAMILY MEDICINE | Facility: CLINIC | Age: 23
End: 2018-08-20

## 2018-08-20 DIAGNOSIS — K21.9 GASTROESOPHAGEAL REFLUX DISEASE, ESOPHAGITIS PRESENCE NOT SPECIFIED: Primary | ICD-10-CM

## 2018-08-20 RX ORDER — DIPHENHYDRAMINE HYDROCHLORIDE AND LIDOCAINE HYDROCHLORIDE AND ALUMINUM HYDROXIDE AND MAGNESIUM HYDRO
15 KIT EVERY 6 HOURS PRN
Qty: 237 ML | Refills: 1 | Status: SHIPPED | OUTPATIENT
Start: 2018-08-20 | End: 2019-01-07

## 2018-08-20 NOTE — TELEPHONE ENCOUNTER
Magic mouthwash      Last Written Prescription Date:  3/7/18  Last Fill Quantity: 237mL,   # refills: 1  Last Office Visit: 5/31/18  Future Office visit:       Routing refill request to provider for review/approval because:  Drug not on the [facility], [facility] or [facility] refill protocol or controlled substance    Inga Flower RN   [facility] - Triage

## 2018-08-30 DIAGNOSIS — Z30.41 ENCOUNTER FOR SURVEILLANCE OF CONTRACEPTIVE PILLS: ICD-10-CM

## 2018-08-30 RX ORDER — DROSPIRENONE AND ETHINYL ESTRADIOL 0.03MG-3MG
KIT ORAL
Qty: 28 TABLET | Refills: 11 | OUTPATIENT
Start: 2018-08-30

## 2018-08-30 NOTE — TELEPHONE ENCOUNTER
Patient has refills remaining with pharmacy.  Lissa Carroll RN - Triage  [facility]

## 2018-08-30 NOTE — TELEPHONE ENCOUNTER
"Requested Prescriptions   Pending Prescriptions Disp Refills     drospirenone-ethinyl estradiol (BRANDON) 3-0.03 MG per tablet [Pharmacy Med Name: DROSPIRENONE-EE 3-0.03 MG TAB]  Last Written Prescription Date:  4/12/18  Last Fill Quantity: 30,  # refills: 11   Last office visit: 7/30/2018 with prescribing provider:  Laverne    Future Office Visit:     28 tablet 11     Sig: TAKE 1 TABLET BY MOUTH DAILY    Contraceptives Protocol Passed    8/30/2018  1:32 AM       Passed - Patient is not a current smoker if age is 35 or older       Passed - Recent (12 mo) or future (30 days) visit within the authorizing provider's specialty    Patient had office visit in the last 12 months or has a visit in the next 30 days with authorizing provider or within the authorizing provider's specialty.  See \"Patient Info\" tab in inbasket, or \"Choose Columns\" in Meds & Orders section of the refill encounter.           Passed - No active pregnancy on record       Passed - No positive pregnancy test in past 12 months          "

## 2018-09-06 DIAGNOSIS — Z30.41 ENCOUNTER FOR SURVEILLANCE OF CONTRACEPTIVE PILLS: ICD-10-CM

## 2018-09-06 RX ORDER — DROSPIRENONE AND ETHINYL ESTRADIOL 0.03MG-3MG
1 KIT ORAL DAILY
Qty: 30 TABLET | Refills: 11 | OUTPATIENT
Start: 2018-09-06

## 2018-09-06 NOTE — TELEPHONE ENCOUNTER
Patient has refills on file, request denied.   Inga Flower RN   [facility] - Triage

## 2018-09-06 NOTE — TELEPHONE ENCOUNTER
"Requested Prescriptions   Pending Prescriptions Disp Refills     drospirenone-ethinyl estradiol (BRANDON) 3-0.03 MG per tablet  Last Written Prescription Date:  4-  Last Fill Quantity: 30 tablet,  # refills: 11   Last office visit: 7/30/2018 with prescribing provider:     Future Office Visit:     30 tablet 11     Sig: Take 1 tablet by mouth daily    Contraceptives Protocol Passed    9/6/2018  1:40 PM       Passed - Patient is not a current smoker if age is 35 or older       Passed - Recent (12 mo) or future (30 days) visit within the authorizing provider's specialty    Patient had office visit in the last 12 months or has a visit in the next 30 days with authorizing provider or within the authorizing provider's specialty.  See \"Patient Info\" tab in inbasket, or \"Choose Columns\" in Meds & Orders section of the refill encounter.           Passed - No active pregnancy on record       Passed - No positive pregnancy test in past 12 months          "

## 2018-09-07 ENCOUNTER — MYC REFILL (OUTPATIENT)
Dept: FAMILY MEDICINE | Facility: CLINIC | Age: 23
End: 2018-09-07

## 2018-09-07 DIAGNOSIS — Z30.41 ENCOUNTER FOR SURVEILLANCE OF CONTRACEPTIVE PILLS: ICD-10-CM

## 2018-09-07 RX ORDER — DROSPIRENONE AND ETHINYL ESTRADIOL 0.03MG-3MG
1 KIT ORAL DAILY
Qty: 30 TABLET | Refills: 11 | Status: CANCELLED | OUTPATIENT
Start: 2018-09-07

## 2018-09-07 NOTE — TELEPHONE ENCOUNTER
Message from [employer]:  Original authorizing provider: APOLINAR Alvarado would like a refill of the following medications:  drospirenone-ethinyl estradiol (BRANDON) 3-0.03 MG per tablet [Yanick Baca PA-C]    Preferred pharmacy: [facility] [id number]    Comment:

## 2018-09-07 NOTE — TELEPHONE ENCOUNTER
"Requested Prescriptions   Pending Prescriptions Disp Refills     drospirenone-ethinyl estradiol (BRANDON) 3-0.03 MG per tablet  Last Written Prescription Date:  4/12/18  Last Fill Quantity: 30,  # refills: 11   Last office visit: 7/30/2018 with prescribing provider:  Laverne   Future Office Visit:     30 tablet 11     Sig: Take 1 tablet by mouth daily    Contraceptives Protocol Passed    9/7/2018  2:37 PM       Passed - Patient is not a current smoker if age is 35 or older       Passed - Recent (12 mo) or future (30 days) visit within the authorizing provider's specialty    Patient had office visit in the last 12 months or has a visit in the next 30 days with authorizing provider or within the authorizing provider's specialty.  See \"Patient Info\" tab in inbasket, or \"Choose Columns\" in Meds & Orders section of the refill encounter.           Passed - No active pregnancy on record       Passed - No positive pregnancy test in past 12 months          "

## 2018-09-07 NOTE — TELEPHONE ENCOUNTER
Patient has refills remaining with pharmacy.  Lissa Carroll RN - Triage  [facility]

## 2018-09-25 ENCOUNTER — OFFICE VISIT (OUTPATIENT)
Dept: FAMILY MEDICINE | Facility: CLINIC | Age: 23
End: 2018-09-25
Payer: COMMERCIAL

## 2018-09-25 VITALS
HEIGHT: 67 IN | HEART RATE: 80 BPM | WEIGHT: 144 LBS | TEMPERATURE: 98.2 F | DIASTOLIC BLOOD PRESSURE: 80 MMHG | BODY MASS INDEX: 22.6 KG/M2 | SYSTOLIC BLOOD PRESSURE: 132 MMHG

## 2018-09-25 DIAGNOSIS — N93.0 POSTCOITAL BLEEDING: Primary | ICD-10-CM

## 2018-09-25 DIAGNOSIS — Z12.4 CERVICAL CANCER SCREENING: ICD-10-CM

## 2018-09-25 DIAGNOSIS — Z23 NEED FOR PROPHYLACTIC VACCINATION AND INOCULATION AGAINST INFLUENZA: ICD-10-CM

## 2018-09-25 DIAGNOSIS — S37.63XA CERVICAL LACERATION, INITIAL ENCOUNTER: ICD-10-CM

## 2018-09-25 PROCEDURE — G0145 SCR C/V CYTO,THINLAYER,RESCR: HCPCS | Performed by: FAMILY MEDICINE

## 2018-09-25 PROCEDURE — 90471 IMMUNIZATION ADMIN: CPT | Performed by: FAMILY MEDICINE

## 2018-09-25 PROCEDURE — 90686 IIV4 VACC NO PRSV 0.5 ML IM: CPT | Performed by: FAMILY MEDICINE

## 2018-09-25 PROCEDURE — 57510 CAUTERIZATION OF CERVIX: CPT | Performed by: FAMILY MEDICINE

## 2018-09-25 PROCEDURE — 99213 OFFICE O/P EST LOW 20 MIN: CPT | Mod: 25 | Performed by: FAMILY MEDICINE

## 2018-09-25 NOTE — PROGRESS NOTES
SUBJECTIVE:   Ary Lutz is a 23 year old female who presents to clinic today for the following health issues:    Complain of postcoital bleeding    Onset: x 3 months    Description: Symptoms are off and on.  Duration of bleeding episodes: just after intercourse blood when wiping the first time  Frequency between periods:  28 day  Describe bleeding/flow:   Clots: no  Number of pads/hour:    Cramping: severe and moderate    Accompanying Signs & Symptoms:  Weakness: no  Lightheadedness: YES  Hot flashes: no  Nosebleeds/Easy bruising: no  Vaginal Discharge: no    History:  No LMP recorded.  Previous normal periods: no  Contraceptive use: oral contraceptive   Possibility of Pregnancy: no  Any bleeding after intercourse: YES  Age of first period (menarche): 10  Abnormal PAP Smears: yes history of HPV infection 2-3 years ago per patient.          Problem list and histories reviewed & adjusted, as indicated.  Additional history: as documented    Patient Active Problem List   Diagnosis     SCOTT (generalized anxiety disorder)     Esophageal reflux     Panic attack     Past Surgical History:   Procedure Laterality Date     ESOPHAGOSCOPY, GASTROSCOPY, DUODENOSCOPY (EGD), COMBINED N/A 9/7/2017    Procedure: COMBINED ESOPHAGOSCOPY, GASTROSCOPY, DUODENOSCOPY (EGD), BIOPSY SINGLE OR MULTIPLE;  gastroscopy;  Surgeon: Hollis Prather MD;  Location:  GI       Social History   Substance Use Topics     Smoking status: Never Smoker     Smokeless tobacco: Never Used     Alcohol use No     Family History   Problem Relation Age of Onset     Thyroid Cancer Mother      Familial Adenomatous Polyposis (FAP) Father          Current Outpatient Prescriptions   Medication Sig Dispense Refill     drospirenone-ethinyl estradiol (BRANDON) 3-0.03 MG per tablet Take 1 tablet by mouth daily 30 tablet 11     esomeprazole (NEXIUM) 40 MG CR capsule Take 1 capsule (40 mg) by mouth every morning (before breakfast) Take 30-60 minutes before  "a eating. 90 capsule 1     FLUoxetine (PROZAC) 40 MG capsule TAKE 1 CAPSULE (40 MG) BY MOUTH DAILY 90 capsule 3     hydrOXYzine (ATARAX) 25 MG tablet TAKE 1-2 TABLETS BY MOUTH DAILY FOR SEVERE ANXIETY 60 tablet 0     magic mouthwash (FIRST-MOUTHWASH BLM) suspension Swish and swallow 15 mLs in mouth every 6 hours as needed (abdominal pain) 237 mL 1     pantoprazole (PROTONIX) 40 MG EC tablet TAKE 1 TABLET BY MOUTH DAILY 90 tablet 1     Allergies   Allergen Reactions     Bactrim [Sulfamethoxazole W/Trimethoprim] Rash       Reviewed and updated as needed this visit by clinical staff       Reviewed and updated as needed this visit by Provider         ROS:  CONSTITUTIONAL: NEGATIVE for fever, chills, change in weight  ENT/MOUTH: NEGATIVE for ear, mouth and throat problems  RESP: NEGATIVE for significant cough or SOB  CV: NEGATIVE for chest pain, palpitations or peripheral edema    OBJECTIVE:                                                    /80  Pulse 80  Temp 98.2  F (36.8  C) (Tympanic)  Ht 5' 7\" (1.702 m)  Wt 144 lb (65.3 kg)  BMI 22.55 kg/m2  Body mass index is 22.55 kg/(m^2).   GENERAL: healthy, alert, well nourished, well hydrated, no distress  RESP: lungs clear to auscultation - no rales, no rhonchi, no wheezes  CV: regular rates and rhythm, normal S1 S2, no S3 or S4 and no murmur, no click or rub -  ABDOMEN: soft, no tenderness, no  hepatosplenomegaly, no masses, normal bowel sounds  - female: cervix-12:00 vertical laceration of 1 cm noted with oozing blood.  No tenderness on palpation.  adnexae- normal; uterus- normal, no masses, no discharge    After obtaining consent, 2 silver nitrate sticks used to cauterize the lacerated area on the cervix.  Hemostasis obtained.     ASSESSMENT/PLAN:                                                        ICD-10-CM    1. Postcoital bleeding N93.0    2. Cervical laceration, initial encounter S37.63XA CAUTERIZATION OF CERVIX   3. Cervical cancer screening Z12.4 Pap " imaged thin layer screen reflex to HPV if ASCUS - recommend age 25 - 29     Postcoital bleeding is likely due to laceration.  Recommended to avoid intercourse or inserting tampon for the next 7-10 days.  If the bleeding continues to happen after the fact, I would recommend seeing an OB/GYN for management.  Pap smear obtained for screening.  Patient reported HPV infection in the past.  No signs of infection noted.  Patient denies concerns for STDs.      Tamir Loyd MD  [facility]

## 2018-09-25 NOTE — PROGRESS NOTES

## 2018-09-25 NOTE — MR AVS SNAPSHOT
"              After Visit Summary   9/25/2018    Ary Lutz    MRN: [id number]           Patient Information     Date Of Birth          1995        Visit Information        Provider Department      9/25/2018 5:40 PM Tamir Loyd MD [facility]        Today's Diagnoses     Encounter for routine gynecological examination    -  1       Follow-ups after your visit        Follow-up notes from your care team     Return in about 1 year (around 9/25/2019) for Annual Physical Exam.      Who to contact     If you have questions or need follow up information about today's clinic visit or your schedule please contact [facility] directly at [phone].  Normal or non-critical lab and imaging results will be communicated to you by MyChart, letter or phone within 4 business days after the clinic has received the results. If you do not hear from us within 7 days, please contact the clinic through Pure360hart or phone. If you have a critical or abnormal lab result, we will notify you by phone as soon as possible.  Submit refill requests through TableApp or call your pharmacy and they will forward the refill request to us. Please allow 3 business days for your refill to be completed.          Additional Information About Your Visit        MyChart Information     TableApp gives you secure access to your electronic health record. If you see a primary care provider, you can also send messages to your care team and make appointments. If you have questions, please call your primary care clinic.  If you do not have a primary care provider, please call [phone] and they will assist you.        Care EveryWhere ID     This is your Care EveryWhere ID. This could be used by other organizations to access your [facility] medical records  [id number]        Your Vitals Were     Pulse Temperature Height BMI (Body Mass Index)          80 98.2  F (36.8  C) (Tympanic) 5' 7\" (1.702 m) 22.55 kg/m2      "    Blood Pressure from Last 3 Encounters:   09/25/18 132/80   07/30/18 110/70   05/31/18 107/70    Weight from Last 3 Encounters:   09/25/18 144 lb (65.3 kg)   07/30/18 140 lb (63.5 kg)   05/31/18 139 lb 6.4 oz (63.2 kg)              We Performed the Following     Pap imaged thin layer screen reflex to HPV if ASCUS - recommend age 25 - 29        Primary Care Provider Office Phone # Fax #    Yanick Baca PA-C [phone]       [address]  [place] [address]        Equal Access to Services     [facility] QIANA : Hadii gill Phan, waaxda becka, qaybta kaalmada camilo, hollis helms. So [facility] [phone].    ATENCIÓN: Si habla español, tiene a spangler disposición servicios gratuitos de asistencia lingüística. Llame al [phone].    We comply with applicable federal civil rights laws and [place] laws. We do not discriminate on the basis of race, color, national origin, age, disability, sex, sexual orientation, or gender identity.            Thank you!     [facility]  [facility]. Our goal is always to provide you with excellent care. Hearing back from our patients is one way we can continue to improve our services. Please take a few minutes to complete the written survey that you may receive in the mail after your visit with us. Thank you!             Your Updated Medication List - Protect others around you: Learn how to safely use, store and throw away your medicines at www.disposemymeds.org.          This list is accurate as of 9/25/18  6:00 PM.  Always use your most recent med list.                   Brand Name Dispense Instructions for use Diagnosis    drospirenone-ethinyl estradiol 3-0.03 MG per tablet    BRANDON    30 tablet    Take 1 tablet by mouth daily    Encounter for surveillance of contraceptive pills       esomeprazole 40 MG CR capsule    nexIUM    90 capsule    Take 1 capsule (40 mg)  by mouth every morning (before breakfast) Take 30-60 minutes before a eating.    Gastroesophageal reflux disease without esophagitis       FLUoxetine 40 MG capsule    PROzac    90 capsule    TAKE 1 CAPSULE (40 MG) BY MOUTH DAILY    SCOTT (generalized anxiety disorder)       hydrOXYzine 25 MG tablet    ATARAX    60 tablet    TAKE 1-2 TABLETS BY MOUTH DAILY FOR SEVERE ANXIETY    Panic attack       magic mouthwash suspension (diphenhydrAMINE, lidocaine, aluminum-magnesium & simethicone) Susp compounding kit     237 mL    Swish and swallow 15 mLs in mouth every 6 hours as needed (abdominal pain)    Gastroesophageal reflux disease, esophagitis presence not specified       pantoprazole 40 MG EC tablet    PROTONIX    90 tablet    TAKE 1 TABLET BY MOUTH DAILY    Gastroesophageal reflux disease without esophagitis

## 2018-09-30 ENCOUNTER — MYC MEDICAL ADVICE (OUTPATIENT)
Dept: FAMILY MEDICINE | Facility: CLINIC | Age: 23
End: 2018-09-30

## 2018-09-30 DIAGNOSIS — R93.89 ABNORMAL ULTRASOUND OF PELVIS: ICD-10-CM

## 2018-09-30 DIAGNOSIS — R10.2 PELVIC PAIN IN FEMALE: Primary | ICD-10-CM

## 2018-10-01 LAB
COPATH REPORT: NORMAL
PAP: NORMAL

## 2018-10-01 NOTE — TELEPHONE ENCOUNTER
Pelvic ultrasound ordered to [facility].  Notify the patient    Reviewed Pap smear result and it is negative.  No abnormality noted.    Tamir Loyd MD  [facility] [place]

## 2018-10-01 NOTE — TELEPHONE ENCOUNTER
Patient sent 2 my chart messages copied in below.    Please review and advise.  Lissa Carroll RN - Triage  [facility]        Hi, I know we are still waiting for results from Pap smear but I was hoping I would be able to set up a ultra sound of some type through this facility?     Sincerely,    Joaquian Diaz MD 13 hours ago (5:50 PM)                        I am just wanting to be checked for ovarian cyst and make sure my lower stomach is okay so I can rule some symptoms out.     Sincerely,     Joaquina

## 2018-10-03 ENCOUNTER — TRANSFERRED RECORDS (OUTPATIENT)
Dept: HEALTH INFORMATION MANAGEMENT | Facility: CLINIC | Age: 23
End: 2018-10-03

## 2018-10-04 ENCOUNTER — TELEPHONE (OUTPATIENT)
Dept: FAMILY MEDICINE | Facility: CLINIC | Age: 23
End: 2018-10-04

## 2018-10-04 NOTE — TELEPHONE ENCOUNTER
Results received from [facility] and given to provider to review.  Pelvic US (Transabdominal & Transvaginal)  [place] LEVY

## 2018-10-05 ENCOUNTER — MYC MEDICAL ADVICE (OUTPATIENT)
Dept: FAMILY MEDICINE | Facility: CLINIC | Age: 23
End: 2018-10-05

## 2018-10-05 NOTE — TELEPHONE ENCOUNTER
I called and spoke to the patient myself.  Results reviewed.  Patient has a complex appearing nabothian cyst but a neoplasm cannot be completely excluded.  Another minute foci in the endocervical area noted.  Recommending to see an OB/GYN for an exam and reviewing results.  Referral information sent to the patient via MY chart    Tamir Loyd MD  [facility] [place]

## 2018-10-05 NOTE — TELEPHONE ENCOUNTER
Please see Veeip message and advise.      Thank you,  Vernell REBOLLEDORN BSN  [facility]  [phone]

## 2018-10-09 ENCOUNTER — OFFICE VISIT (OUTPATIENT)
Dept: OBGYN | Facility: CLINIC | Age: 23
End: 2018-10-09
Attending: FAMILY MEDICINE
Payer: COMMERCIAL

## 2018-10-09 VITALS
DIASTOLIC BLOOD PRESSURE: 70 MMHG | HEIGHT: 67 IN | BODY MASS INDEX: 23.07 KG/M2 | SYSTOLIC BLOOD PRESSURE: 110 MMHG | WEIGHT: 147 LBS

## 2018-10-09 DIAGNOSIS — N88.8 NABOTHIAN CYST: ICD-10-CM

## 2018-10-09 DIAGNOSIS — N93.0 PCB (POST COITAL BLEEDING): Primary | ICD-10-CM

## 2018-10-09 PROCEDURE — 99243 OFF/OP CNSLTJ NEW/EST LOW 30: CPT | Performed by: OBSTETRICS & GYNECOLOGY

## 2018-10-09 ASSESSMENT — PATIENT HEALTH QUESTIONNAIRE - PHQ9: 5. POOR APPETITE OR OVEREATING: MORE THAN HALF THE DAYS

## 2018-10-09 ASSESSMENT — ANXIETY QUESTIONNAIRES
5. BEING SO RESTLESS THAT IT IS HARD TO SIT STILL: SEVERAL DAYS
6. BECOMING EASILY ANNOYED OR IRRITABLE: MORE THAN HALF THE DAYS
7. FEELING AFRAID AS IF SOMETHING AWFUL MIGHT HAPPEN: MORE THAN HALF THE DAYS
1. FEELING NERVOUS, ANXIOUS, OR ON EDGE: MORE THAN HALF THE DAYS
IF YOU CHECKED OFF ANY PROBLEMS ON THIS QUESTIONNAIRE, HOW DIFFICULT HAVE THESE PROBLEMS MADE IT FOR YOU TO DO YOUR WORK, TAKE CARE OF THINGS AT HOME, OR GET ALONG WITH OTHER PEOPLE: VERY DIFFICULT
GAD7 TOTAL SCORE: 13
2. NOT BEING ABLE TO STOP OR CONTROL WORRYING: MORE THAN HALF THE DAYS
3. WORRYING TOO MUCH ABOUT DIFFERENT THINGS: MORE THAN HALF THE DAYS

## 2018-10-09 NOTE — PROGRESS NOTES
SUBJECTIVE:                                                   Ary Lutz is a 23 year old female who presents to clinic today for the following health issue(s):  Patient presents with:  Consult: recommended by primary care provider to follow-up regarding ultrasound findings.      HPI: sent by Dr Loyd to further evaluate pelvic ultrasound findings.  Had complaints of intermittent lower abdm/pelvic cramping as well as post coital spotting. Saw PCP and area on cervix cauterized with silver nitrate. No bleeding since the treatment.  Ultrasound obtained for workup. This showed possible mass vs nabothian cyst on cervix and possible air vs calcium in cervical canal.  No pelvic pathology noted.  On OCPs. No BTB. Has been on for years.      Patient's last menstrual period was 2018..   Patient is sexually active, .  Using oral contraceptives and condoms for contraception.    reports that she has never smoked. She has never used smokeless tobacco.  STD testing offered?  recently screened negative  Health maintenance updated:  yes    Today's PHQ-2 Score:   PHQ-2 (  [employer]) 2018   Q1: Little interest or pleasure in doing things 0   Q2: Feeling down, depressed or hopeless 0   PHQ-2 Score 0     Today's PHQ-9 Score:   PHQ-9 SCORE 10/9/2018   Total Score MyChart -   Total Score 4     Today's SCOTT-7 Score:   SCOTT-7 SCORE 10/9/2018   Total Score 13       Problem list and histories reviewed & adjusted, as indicated.  Additional history: as documented.    Patient Active Problem List   Diagnosis     SCOTT (generalized anxiety disorder)     Esophageal reflux     Panic attack     Past Surgical History:   Procedure Laterality Date     ESOPHAGOSCOPY, GASTROSCOPY, DUODENOSCOPY (EGD), COMBINED N/A 2017    Procedure: COMBINED ESOPHAGOSCOPY, GASTROSCOPY, DUODENOSCOPY (EGD), BIOPSY SINGLE OR MULTIPLE;  gastroscopy;  Surgeon: Hollis Prather MD;  Location:  GI      Social History   Substance Use Topics      "Smoking status: Never Smoker     Smokeless tobacco: Never Used     Alcohol use No      Problem (# of Occurrences) Relation (Name,Age of Onset)    Familial Adenomatous Polyposis (FAP) (1) Father    Thyroid Cancer (1) Mother            Current Outpatient Prescriptions   Medication Sig     drospirenone-ethinyl estradiol (BRANDON) 3-0.03 MG per tablet Take 1 tablet by mouth daily     esomeprazole (NEXIUM) 40 MG CR capsule Take 1 capsule (40 mg) by mouth every morning (before breakfast) Take 30-60 minutes before a eating.     FLUoxetine (PROZAC) 40 MG capsule TAKE 1 CAPSULE (40 MG) BY MOUTH DAILY     hydrOXYzine (ATARAX) 25 MG tablet TAKE 1-2 TABLETS BY MOUTH DAILY FOR SEVERE ANXIETY     magic mouthwash (FIRST-MOUTHWASH BLM) suspension Swish and swallow 15 mLs in mouth every 6 hours as needed (abdominal pain)     pantoprazole (PROTONIX) 40 MG EC tablet TAKE 1 TABLET BY MOUTH DAILY     No current facility-administered medications for this visit.      Allergies   Allergen Reactions     Bactrim [Sulfamethoxazole W/Trimethoprim] Rash       ROS:  12 point review of systems negative other than symptoms noted below.  Gastrointestinal: Bloating and Nausea  Genitourinary: Cramps, Pelvic Pain and Spotting    OBJECTIVE:     /70  Ht 5' 7\" (1.702 m)  Wt 147 lb (66.7 kg)  LMP 09/03/2018  BMI 23.02 kg/m2  Body mass index is 23.02 kg/(m^2).    Exam:  Constitutional:  Appearance: Well nourished, well developed alert, in no acute distress  Neurologic/Psychiatric:  Mental Status:  Oriented X3   Pelvic Exam:  External Genitalia:     Normal appearance for age, no discharge present, no tenderness present, no inflammatory lesions present, color normal  Vagina:     Normal vaginal vault without central or paravaginal defects, no discharge present, no inflammatory lesions present, no masses present  Bladder:     Nontender to palpation  Urethra:   Urethral Body:  Urethra palpation normal, urethra structural support normal   Urethral " Meatus:  No erythema or lesions present  Cervix:     Appearance healthy, no lesions present, nontender to palpation, no bleeding present  Perineum:     Perineum within normal limits, no evidence of trauma, no rashes or skin lesions present  Anus:     Anus within normal limits, no hemorrhoids present  Inguinal Lymph Nodes:     No lymphadenopathy present  Pubic Hair:     Normal pubic hair distribution for age  Genitalia and Groin:     No rashes present, no lesions present, no areas of discoloration, no masses present      Friable area possibly ectropion       In-Clinic Test Results:  No results found for this or any previous visit (from the past 24 hour(s)).    ASSESSMENT/PLAN:                                                        ICD-10-CM    1. PCB (post coital bleeding) N93.0    2. Nabothian cyst N88.8          Pt is on OCPs. Ultrasound essentially negative. No dyspareunia. Doubt pelvic cramping is GYN in origin.  Nabothian cysts noted. These are consistent with ultrasound findings.   Area of friable tissue may be consistent with ectropion. If has persistent bleeding can consider cryo to that spot.   Reassured about nabothian cysts. Used mirror in exam to show her the areas.  Reviewed benefits of OCPs and why she should stay on them.    Adolfo Issa MD  [facility] [place]

## 2018-10-09 NOTE — MR AVS SNAPSHOT
"              After Visit Summary   10/9/2018    Ary Lutz    MRN: [id number]           Patient Information     Date Of Birth          1995        Visit Information        Provider Department      10/9/2018 2:10 PM Adolfo Issa MD [facility]        Today's Diagnoses     PCB (post coital bleeding)    -  1    Nabothian cyst           Follow-ups after your visit        Who to contact     If you have questions or need follow up information about today's clinic visit or your schedule please contact [facility] directly at [phone].  Normal or non-critical lab and imaging results will be communicated to you by [employer], letter or phone within 4 business days after the clinic has received the results. If you do not hear from us within 7 days, please contact the clinic through [employer] or phone. If you have a critical or abnormal lab result, we will notify you by phone as soon as possible.  Submit refill requests through [employer] or call your pharmacy and they will forward the refill request to us. Please allow 3 business days for your refill to be completed.          Additional Information About Your Visit        MyChart Information     [employer] gives you secure access to your electronic health record. If you see a primary care provider, you can also send messages to your care team and make appointments. If you have questions, please call your primary care clinic.  If you do not have a primary care provider, please call [phone] and they will assist you.        Care EveryWhere ID     This is your Care EveryWhere ID. This could be used by other organizations to access your [facility] medical records  [id number]        Your Vitals Were     Height Last Period BMI (Body Mass Index)             5' 7\" (1.702 m) 09/03/2018 23.02 kg/m2          Blood Pressure from Last 3 Encounters:   10/09/18 110/70   09/25/18 132/80   07/30/18 110/70    Weight from Last 3 " Encounters:   10/09/18 147 lb (66.7 kg)   09/25/18 144 lb (65.3 kg)   07/30/18 140 lb (63.5 kg)              Today, you had the following     No orders found for display       Primary Care Provider Office Phone # Fax #    Yanick Baca PA-C [phone]       [address]  [place] [address]        Equal Access to Services     [facility] QIANA : Hadii aad ku hadasho Soomaali, waaxda luqadaha, qaybta kaalmada adeegyada, waxay idiin hayaan adeeg kharash la'aan . So [facility] [phone].    ATENCIÓN: Si habla español, tiene a spangler disposición servicios gratuitos de asistencia lingüística. [facility] [phone].    We comply with applicable federal civil rights laws and [place] laws. We do not discriminate on the basis of race, color, national origin, age, disability, sex, sexual orientation, or gender identity.            Thank you!     Thank you for choosing [facility]  for your care. Our goal is always to provide you with excellent care. Hearing back from our patients is one way we can continue to improve our services. Please take a few minutes to complete the written survey that you may receive in the mail after your visit with us. Thank you!             Your Updated Medication List - Protect others around you: Learn how to safely use, store and throw away your medicines at www.disposemymeds.org.          This list is accurate as of 10/9/18  3:09 PM.  Always use your most recent med list.                   Brand Name Dispense Instructions for use Diagnosis    drospirenone-ethinyl estradiol 3-0.03 MG per tablet    BRANDON    30 tablet    Take 1 tablet by mouth daily    Encounter for surveillance of contraceptive pills       esomeprazole 40 MG CR capsule    nexIUM    90 capsule    Take 1 capsule (40 mg) by mouth every morning (before breakfast) Take 30-60 minutes before a eating.    Gastroesophageal reflux disease without esophagitis       FLUoxetine 40 MG capsule     PROzac    90 capsule    TAKE 1 CAPSULE (40 MG) BY MOUTH DAILY    SCOTT (generalized anxiety disorder)       hydrOXYzine 25 MG tablet    ATARAX    60 tablet    TAKE 1-2 TABLETS BY MOUTH DAILY FOR SEVERE ANXIETY    Panic attack       magic mouthwash suspension (diphenhydrAMINE, lidocaine, aluminum-magnesium & simethicone) Susp compounding kit     237 mL    Swish and swallow 15 mLs in mouth every 6 hours as needed (abdominal pain)    Gastroesophageal reflux disease, esophagitis presence not specified       pantoprazole 40 MG EC tablet    PROTONIX    90 tablet    TAKE 1 TABLET BY MOUTH DAILY    Gastroesophageal reflux disease without esophagitis

## 2018-10-09 NOTE — LETTER
10/9/2018        RE: Ary Lutz  [address]  [place] [facility] [address]            SUBJECTIVE:                                                   Ary Lutz is a 23 year old female who presents to clinic today for the following health issue(s):  Patient presents with:  Consult: recommended by primary care provider to follow-up regarding ultrasound findings.      HPI: sent by Dr Loyd to further evaluate pelvic ultrasound findings.  Had complaints of intermittent lower abdm/pelvic cramping as well as post coital spotting. Saw PCP and area on cervix cauterized with silver nitrate. No bleeding since the treatment.  Ultrasound obtained for workup. This showed possible mass vs nabothian cyst on cervix and possible air vs calcium in cervical canal.  No pelvic pathology noted.  On OCPs. No BTB. Has been on for years.      Patient's last menstrual period was 2018..   Patient is sexually active, .  Using oral contraceptives and condoms for contraception.    reports that she has never smoked. She has never used smokeless tobacco.  STD testing offered?  recently screened negative  Health maintenance updated:  yes    Today's PHQ-2 Score:   PHQ-2 (  [employer]) 2018   Q1: Little interest or pleasure in doing things 0   Q2: Feeling down, depressed or hopeless 0   PHQ-2 Score 0     Today's PHQ-9 Score:   PHQ-9 SCORE 10/9/2018   Total Score MyChart -   Total Score 4     Today's SCOTT-7 Score:   SCOTT-7 SCORE 10/9/2018   Total Score 13       Problem list and histories reviewed & adjusted, as indicated.  Additional history: as documented.    Patient Active Problem List   Diagnosis     SCOTT (generalized anxiety disorder)     Esophageal reflux     Panic attack     Past Surgical History:   Procedure Laterality Date     ESOPHAGOSCOPY, GASTROSCOPY, DUODENOSCOPY (EGD), COMBINED N/A 2017    Procedure: COMBINED ESOPHAGOSCOPY, GASTROSCOPY, DUODENOSCOPY (EGD), BIOPSY SINGLE OR MULTIPLE;   "gastroscopy;  Surgeon: Hollis Prather MD;  Location:  GI      Social History   Substance Use Topics     Smoking status: Never Smoker     Smokeless tobacco: Never Used     Alcohol use No      Problem (# of Occurrences) Relation (Name,Age of Onset)    Familial Adenomatous Polyposis (FAP) (1) Father    Thyroid Cancer (1) Mother            Current Outpatient Prescriptions   Medication Sig     drospirenone-ethinyl estradiol (BRANDON) 3-0.03 MG per tablet Take 1 tablet by mouth daily     esomeprazole (NEXIUM) 40 MG CR capsule Take 1 capsule (40 mg) by mouth every morning (before breakfast) Take 30-60 minutes before a eating.     FLUoxetine (PROZAC) 40 MG capsule TAKE 1 CAPSULE (40 MG) BY MOUTH DAILY     hydrOXYzine (ATARAX) 25 MG tablet TAKE 1-2 TABLETS BY MOUTH DAILY FOR SEVERE ANXIETY     magic mouthwash (FIRST-MOUTHWASH BLM) suspension Swish and swallow 15 mLs in mouth every 6 hours as needed (abdominal pain)     pantoprazole (PROTONIX) 40 MG EC tablet TAKE 1 TABLET BY MOUTH DAILY     No current facility-administered medications for this visit.      Allergies   Allergen Reactions     Bactrim [Sulfamethoxazole W/Trimethoprim] Rash       ROS:  12 point review of systems negative other than symptoms noted below.  Gastrointestinal: Bloating and Nausea  Genitourinary: Cramps, Pelvic Pain and Spotting    OBJECTIVE:     /70  Ht 5' 7\" (1.702 m)  Wt 147 lb (66.7 kg)  LMP 09/03/2018  BMI 23.02 kg/m2  Body mass index is 23.02 kg/(m^2).    Exam:  Constitutional:  Appearance: Well nourished, well developed alert, in no acute distress  Neurologic/Psychiatric:  Mental Status:  Oriented X3   Pelvic Exam:  External Genitalia:     Normal appearance for age, no discharge present, no tenderness present, no inflammatory lesions present, color normal  Vagina:     Normal vaginal vault without central or paravaginal defects, no discharge present, no inflammatory lesions present, no masses present  Bladder:     Nontender to " palpation  Urethra:   Urethral Body:  Urethra palpation normal, urethra structural support normal   Urethral Meatus:  No erythema or lesions present  Cervix:     Appearance healthy, no lesions present, nontender to palpation, no bleeding present  Perineum:     Perineum within normal limits, no evidence of trauma, no rashes or skin lesions present  Anus:     Anus within normal limits, no hemorrhoids present  Inguinal Lymph Nodes:     No lymphadenopathy present  Pubic Hair:     Normal pubic hair distribution for age  Genitalia and Groin:     No rashes present, no lesions present, no areas of discoloration, no masses present      Friable area possibly ectropion       In-Clinic Test Results:  No results found for this or any previous visit (from the past 24 hour(s)).    ASSESSMENT/PLAN:                                                        ICD-10-CM    1. PCB (post coital bleeding) N93.0    2. Nabothian cyst N88.8          Pt is on OCPs. Ultrasound essentially negative. No dyspareunia. Doubt pelvic cramping is GYN in origin.  Nabothian cysts noted. These are consistent with ultrasound findings.   Area of friable tissue may be consistent with ectropion. If has persistent bleeding can consider cryo to that spot.   Reassured about nabothian cysts. Used mirror in exam to show her the areas.  Reviewed benefits of OCPs and why she should stay on them.    Adolfo Issa MD  [facility] [place]      Sincerely,        Adolfo Issa MD

## 2018-10-10 ASSESSMENT — ANXIETY QUESTIONNAIRES: GAD7 TOTAL SCORE: 13

## 2018-10-10 ASSESSMENT — PATIENT HEALTH QUESTIONNAIRE - PHQ9: SUM OF ALL RESPONSES TO PHQ QUESTIONS 1-9: 4

## 2018-10-23 DIAGNOSIS — K21.9 GASTROESOPHAGEAL REFLUX DISEASE WITHOUT ESOPHAGITIS: ICD-10-CM

## 2018-10-23 RX ORDER — PANTOPRAZOLE SODIUM 40 MG/1
TABLET, DELAYED RELEASE ORAL
Qty: 90 TABLET | Refills: 3 | Status: SHIPPED | OUTPATIENT
Start: 2018-10-23 | End: 2019-10-08

## 2018-10-23 NOTE — TELEPHONE ENCOUNTER
Prescription approved per [facility] Refill Protocol.  Inga Flower RN   [facility] - Triage

## 2018-10-23 NOTE — TELEPHONE ENCOUNTER
"Requested Prescriptions   Pending Prescriptions Disp Refills     pantoprazole (PROTONIX) 40 MG EC tablet [Pharmacy Med Name: PANTOPRAZOLE SOD DR 40 MG TAB] 90 tablet 1     Sig: TAKE 1 TABLET BY MOUTH DAILY    PPI Protocol Passed    10/23/2018  1:48 AM       Passed - Not on Clopidogrel (unless Pantoprazole ordered)       Passed - No diagnosis of osteoporosis on record       Passed - Recent (12 mo) or future (30 days) visit within the authorizing provider's specialty    Patient had office visit in the last 12 months or has a visit in the next 30 days with authorizing provider or within the authorizing provider's specialty.  See \"Patient Info\" tab in inbasket, or \"Choose Columns\" in Meds & Orders section of the refill encounter.             Passed - Patient is age 18 or older       Passed - No active pregnacy on record       Passed - No positive pregnancy test in past 12 months        pantoprazole (PROTONIX) 40 MG EC tablet 90 tablet 1 4/23/2018       Last Written Prescription Date:  04/23/2018  Last Fill Quantity: 90,  # refills: 1   Last office visit: 9/25/2018 with prescribing provider:  Dr. Loyd   Future Office Visit:  Unknown     "

## 2018-12-05 ENCOUNTER — HOSPITAL ENCOUNTER (EMERGENCY)
Facility: CLINIC | Age: 23
Discharge: HOME | End: 2018-12-05
Attending: EMERGENCY MEDICINE | Admitting: EMERGENCY MEDICINE
Payer: COMMERCIAL

## 2018-12-05 VITALS
DIASTOLIC BLOOD PRESSURE: 84 MMHG | HEIGHT: 67 IN | OXYGEN SATURATION: 100 % | BODY MASS INDEX: 22.76 KG/M2 | TEMPERATURE: 97.9 F | RESPIRATION RATE: 16 BRPM | WEIGHT: 145 LBS | SYSTOLIC BLOOD PRESSURE: 119 MMHG | HEART RATE: 104 BPM

## 2018-12-05 DIAGNOSIS — R51.9 NONINTRACTABLE HEADACHE, UNSPECIFIED CHRONICITY PATTERN, UNSPECIFIED HEADACHE TYPE: ICD-10-CM

## 2018-12-05 PROCEDURE — 96372 THER/PROPH/DIAG INJ SC/IM: CPT

## 2018-12-05 PROCEDURE — 96375 TX/PRO/DX INJ NEW DRUG ADDON: CPT

## 2018-12-05 PROCEDURE — 25000128 H RX IP 250 OP 636: Performed by: EMERGENCY MEDICINE

## 2018-12-05 PROCEDURE — 99284 EMERGENCY DEPT VISIT MOD MDM: CPT | Mod: 25

## 2018-12-05 PROCEDURE — 96365 THER/PROPH/DIAG IV INF INIT: CPT

## 2018-12-05 RX ORDER — SUMATRIPTAN 25 MG/1
TABLET, FILM COATED ORAL
Qty: 8 TABLET | Refills: 0 | Status: SHIPPED | OUTPATIENT
Start: 2018-12-05 | End: 2019-01-07

## 2018-12-05 RX ORDER — MAGNESIUM SULFATE HEPTAHYDRATE 40 MG/ML
2 INJECTION, SOLUTION INTRAVENOUS ONCE
Status: COMPLETED | OUTPATIENT
Start: 2018-12-05 | End: 2018-12-05

## 2018-12-05 RX ORDER — SUMATRIPTAN 6 MG/.5ML
6 INJECTION, SOLUTION SUBCUTANEOUS ONCE
Status: COMPLETED | OUTPATIENT
Start: 2018-12-05 | End: 2018-12-05

## 2018-12-05 RX ORDER — KETOROLAC TROMETHAMINE 15 MG/ML
15 INJECTION, SOLUTION INTRAMUSCULAR; INTRAVENOUS ONCE
Status: COMPLETED | OUTPATIENT
Start: 2018-12-05 | End: 2018-12-05

## 2018-12-05 RX ORDER — METOCLOPRAMIDE HYDROCHLORIDE 5 MG/ML
10 INJECTION INTRAMUSCULAR; INTRAVENOUS ONCE
Status: COMPLETED | OUTPATIENT
Start: 2018-12-05 | End: 2018-12-05

## 2018-12-05 RX ADMIN — SUMATRIPTAN 6 MG: 6 INJECTION SUBCUTANEOUS at 14:44

## 2018-12-05 RX ADMIN — SODIUM CHLORIDE 1000 ML: 9 INJECTION, SOLUTION INTRAVENOUS at 14:35

## 2018-12-05 RX ADMIN — METOCLOPRAMIDE HYDROCHLORIDE 10 MG: 5 INJECTION INTRAMUSCULAR; INTRAVENOUS at 14:46

## 2018-12-05 RX ADMIN — KETOROLAC TROMETHAMINE 15 MG: 15 INJECTION, SOLUTION INTRAMUSCULAR; INTRAVENOUS at 14:45

## 2018-12-05 RX ADMIN — MAGNESIUM SULFATE HEPTAHYDRATE 2 G: 40 INJECTION, SOLUTION INTRAVENOUS at 14:47

## 2018-12-05 ASSESSMENT — ENCOUNTER SYMPTOMS
HEADACHES: 1
ABDOMINAL PAIN: 0
PHOTOPHOBIA: 0
EYE PAIN: 0
NECK PAIN: 0

## 2018-12-05 NOTE — DISCHARGE INSTRUCTIONS
* Headache (Unspecified)    The cause of your headache today is not clear, but it does not appear to be the sign of any serious illness.  Under stress, some people tense the muscles of their shoulder, neck and scalp without knowing it. If this condition lasts long enough, a TENSION HEADACHE can occur.  A MIGRAINE HEADACHE is caused by changes in blood flow to the brain. It can be mild or severe. A migraine attack may be triggered by emotional stress, hormone changes during the menstrual cycle, oral contraceptives, alcohol use, certain foods containing tyramine, eye strain, weather changes, missing meals, lack of sleep or oversleeping.  Other causes of headache include a viral illness, sinus, ear or throat infection, dental pain and TMJ (jaw joint) pain.  Home Care:    If you were given pain medicine for this headache, do not drive yourself home. Arrange for a ride, instead. When you get home, try to sleep. You should feel much better when you wake up.    If you are having nausea or vomiting, follow a light diet until your headache is relieved.    If you have a migraine type headache, use sunglasses when in the daylight or around bright indoor lighting until symptoms improve. Bright glaring light can worsen this kind of headache.  Follow Up  with your doctor if the headache is not better within the next 24 hours. If you have frequent headaches you should discuss a treatment plan with your primary care doctor. By being aware of the earliest signs of headache, and starting treatment right away, you may be able to stop the pain yourself.  Get Prompt Medical Attention  if any of the following occur:    Worsening of your head pain or no improvement within 24 hours    Repeated vomiting (unable to keep liquids down)    Fever over 101 F (38.3 C)    Stiff neck    Extreme drowsiness, confusion or fainting    Weakness of an arm or leg or one side of the face    Difficulty with speech or vision    [id number] [facility]  [employer]. [address] [place] [address], [place] [address]. All rights reserved. This information is not intended as a substitute for professional medical care. Always follow your healthcare professional's instructions.  This information has been modified by your health care provider with permission from the publisher.  Modifications clinically reviewed by Dr. Raul Edmonds on 7/20/18.

## 2018-12-05 NOTE — ED AVS SNAPSHOT
Emergency Department    [address] [facility] [address]    Phone:  [phone]    Fax:  [phone]                                       Ary Lutz   MRN: [id number]    Department:   Emergency Department   Date of Visit:  12/5/2018           Patient Information     Date Of Birth          1995        Your diagnoses for this visit were:     Nonintractable headache, unspecified chronicity pattern, unspecified headache type        You were seen by Ramin Owen MD.      Follow-up Information     Follow up with Yanick Baca PA-C. Schedule an appointment as soon as possible for a visit in 3 days.    Specialty:  Physician Assistant    Why:  As needed, For repeat evaluation and symptom check    Contact information:    [address] [place] [address] [address]  [place] [address] [phone]          Follow up with  Emergency Department.    Specialty:  EMERGENCY MEDICINE    Why:  If symptoms worsen    Contact information:    [address] [facility] [address] [phone]        Discharge Instructions          * Headache (Unspecified)    The cause of your headache today is not clear, but it does not appear to be the sign of any serious illness.  Under stress, some people tense the muscles of their shoulder, neck and scalp without knowing it. If this condition lasts long enough, a TENSION HEADACHE can occur.  A MIGRAINE HEADACHE is caused by changes in blood flow to the brain. It can be mild or severe. A migraine attack may be triggered by emotional stress, hormone changes during the menstrual cycle, oral contraceptives, alcohol use, certain foods containing tyramine, eye strain, weather changes, missing meals, lack of sleep or oversleeping.  Other causes of headache include a viral illness, sinus, ear or throat infection, dental pain and TMJ (jaw joint) pain.  Home Care:    If you were given pain medicine for this headache, do not drive yourself  home. Arrange for a ride, instead. When you get home, try to sleep. You should feel much better when you wake up.    If you are having nausea or vomiting, follow a light diet until your headache is relieved.    If you have a migraine type headache, use sunglasses when in the daylight or around bright indoor lighting until symptoms improve. Bright glaring light can worsen this kind of headache.  Follow Up  with your doctor if the headache is not better within the next 24 hours. If you have frequent headaches you should discuss a treatment plan with your primary care doctor. By being aware of the earliest signs of headache, and starting treatment right away, you may be able to stop the pain yourself.  Get Prompt Medical Attention  if any of the following occur:    Worsening of your head pain or no improvement within 24 hours    Repeated vomiting (unable to keep liquids down)    Fever over 101 F (38.3 C)    Stiff neck    Extreme drowsiness, confusion or fainting    Weakness of an arm or leg or one side of the face    Difficulty with speech or vision    3947-0697 [employer]. [address] [place] [address]. All rights reserved. This information is not intended as a substitute for professional medical care. Always follow your healthcare professional's instructions.  This information has been modified by your health care provider with permission from the publisher.  Modifications clinically reviewed by Dr. Raul Edmonds on 7/20/18.      Your next 10 appointments already scheduled     Dec 07, 2018  8:40 AM CST   Office Visit with Angel Diaz MD   [facility] ([facility]    [address] [place] [id number] [phone]           Bring a current list of meds and any records pertaining to this visit. For Physicals, please bring immunization records and any forms needing to be filled out. Please arrive 10 minutes early to complete paperwork.               24 Hour Appointment Hotline       To make an appointment at any [facility], call [phone] ([phone]). If you don't have a family doctor or clinic, we will help you find one. [facility] clinics are conveniently located to serve the needs of you and your family.             Review of your medicines      START taking        Dose / Directions Last dose taken    SUMAtriptan 25 MG tablet   Commonly known as:  IMITREX   Quantity:  8 tablet        Take 25-100mg one time. May repeat 25mg every two hours up to a maximum of 200mg in 24 hours.   Refills:  0          Our records show that you are taking the medicines listed below. If these are incorrect, please call your family doctor or clinic.        Dose / Directions Last dose taken    drospirenone-ethinyl estradiol 3-0.03 MG tablet   Commonly known as:  BRANDON   Dose:  1 tablet   Quantity:  30 tablet        Take 1 tablet by mouth daily   Refills:  11        esomeprazole 40 MG DR capsule   Commonly known as:  nexIUM   Dose:  40 mg   Quantity:  90 capsule        Take 1 capsule (40 mg) by mouth every morning (before breakfast) Take 30-60 minutes before a eating.   Refills:  1        FLUoxetine 40 MG capsule   Commonly known as:  PROzac   Quantity:  90 capsule        TAKE 1 CAPSULE (40 MG) BY MOUTH DAILY   Refills:  3        hydrOXYzine 25 MG tablet   Commonly known as:  ATARAX   Quantity:  60 tablet        TAKE 1-2 TABLETS BY MOUTH DAILY FOR SEVERE ANXIETY   Refills:  0        magic mouthwash suspension (diphenhydrAMINE, lidocaine, aluminum-magnesium & simethicone) compounding kit   Dose:  15 mL   Quantity:  237 mL        Swish and swallow 15 mLs in mouth every 6 hours as needed (abdominal pain)   Refills:  1        pantoprazole 40 MG EC tablet   Commonly known as:  PROTONIX   Quantity:  90 tablet        TAKE 1 TABLET BY MOUTH DAILY   Refills:  3                Prescriptions were sent or printed at these locations (1 Prescription)                    Other Prescriptions                Printed at Department/Unit printer (1 of 1)         SUMAtriptan (IMITREX) 25 MG tablet                Orders Needing Specimen Collection     None      Pending Results     No orders found from 12/3/2018 to 12/6/2018.            Pending Culture Results     No orders found from 12/3/2018 to 12/6/2018.            Pending Results Instructions     If you had any lab results that were not finalized at the time of your Discharge, you can call the ED Lab Result RN at [phone]. You will be contacted by this team for any positive Lab results or changes in treatment. The nurses are available 7 days a week from 10A to 6:30P.  You can leave a message 24 hours per day and they will return your call.        Test Results From Your Hospital Stay               Clinical Quality Measure: Blood Pressure Screening     Your blood pressure was checked while you were in the emergency department today. The last reading we obtained was  BP: 119/84 . Please read the guidelines below about what these numbers mean and what you should do about them.  If your systolic blood pressure (the top number) is less than 120 and your diastolic blood pressure (the bottom number) is less than 80, then your blood pressure is normal. There is nothing more that you need to do about it.  If your systolic blood pressure (the top number) is 120-139 or your diastolic blood pressure (the bottom number) is 80-89, your blood pressure may be higher than it should be. You should have your blood pressure rechecked within a year by a primary care provider.  If your systolic blood pressure (the top number) is 140 or greater or your diastolic blood pressure (the bottom number) is 90 or greater, you may have high blood pressure. High blood pressure is treatable, but if left untreated over time it can put you at risk for heart attack, stroke, or kidney failure. You should have your blood pressure rechecked by a primary care provider  within the next 4 weeks.  If your provider in the emergency department today gave you specific instructions to follow-up with your doctor or provider even sooner than that, you should follow that instruction and not wait for up to 4 weeks for your follow-up visit.        Thank you for choosing [facility]       Thank you for choosing [facility] for your care. Our goal is always to provide you with excellent care. Hearing back from our patients is one way we can continue to improve our services. Please take a few minutes to complete the written survey that you may receive in the mail after you visit with us. Thank you!        [facility] [employer] Information     [employer] gives you secure access to your electronic health record. If you see a primary care provider, you can also send messages to your care team and make appointments. If you have questions, please call your primary care clinic.  If you do not have a primary care provider, please call [phone] and they will assist you.        Care EveryWhere ID     This is your Care EveryWhere ID. This could be used by other organizations to access your [facility] medical records  [id number]        Equal Access to Services     JOSE KIRAN : Hadii gill varelao Socira, waaxda luqadaha, qaybta kaalmada adenolan, hollis arroyo . So [facility] [phone].    ATENCIÓN: Si habla español, tiene a spangler disposición servicios gratuitos de asistencia lingüística. Llame al [phone].    We comply with applicable federal civil rights laws and [place] laws. We do not discriminate on the basis of race, color, national origin, age, disability, sex, sexual orientation, or gender identity.            After Visit Summary       This is your record. Keep this with you and show to your community pharmacist(s) and doctor(s) at your next visit.

## 2018-12-05 NOTE — ED AVS SNAPSHOT
Emergency Department    [address] [place] [address]    Phone:  [phone]    Fax:  [phone]                                       Ary Lutz   MRN: [id number]    Department:   Emergency Department   Date of Visit:  12/5/2018           After Visit Summary Signature Page     I have received my discharge instructions, and my questions have been answered. I have discussed any challenges I see with this plan with the nurse or doctor.    ..........................................................................................................................................  Patient/Patient Representative Signature      ..........................................................................................................................................  Patient Representative Print Name and Relationship to Patient    ..................................................               ................................................  Date                                   Time    ..........................................................................................................................................  Reviewed by Signature/Title    ...................................................              ..............................................  Date                                               Time          22EPIC Rev 08/18

## 2018-12-05 NOTE — ED PROVIDER NOTES
"  History     Chief Complaint:  Headache    The history is provided by the patient and a significant other.      Ary Lutz is a 23 year old female with a history of anxiety who presents to the emergency department with her boyfriend for evaluation of a headache. Starting two days ago, the patient had the slow onset of a headache in the right eyebrow that is throbbing and has continued to persist till today. She has had similar headaches before, for which she normally takes Excedrin Migraine with relief. This is not providing complete relief for the current head pain, prompting her to go to urgent care, who then sent the patient here to the emergency department for evaluation because the patient was noted to have unequal pupils at urgent care. No neck pain, eye pain, visual disturbances, photophobia, major sleep changes, abdominal pain. She is on an OCP, Sarah. Boyfriend states the patient is acting normally and does not think her pupils look different to him.      Allergies:  Bactrim     Medications:    Sarah  Nexium  Prozac  Protonix    Past Medical History:    Panic attack  GERD  SCOTT  Chlamydia infection     Past Surgical History:    EGD    Family History:    Thyroid cancer  Colon cancer  Familial Adenomatous polyposis    Social History:  Negative for tobacco use.  Negative for alcohol use.  Marital Status:  Single      Review of Systems   Eyes: Negative for photophobia, pain and visual disturbance.   Gastrointestinal: Negative for abdominal pain.   Musculoskeletal: Negative for neck pain.   Neurological: Positive for headaches.   All other systems reviewed and are negative.    Physical Exam     Patient Vitals for the past 24 hrs:   BP Temp Temp src Pulse Resp SpO2 Height Weight   12/05/18 1530 119/84 - - - - - - -   12/05/18 1515 (!) 115/92 - - - - - - -   12/05/18 1500 (!) 123/94 - - - - 100 % - -   12/05/18 1304 135/88 97.9  F (36.6  C) Oral 104 16 99 % 1.702 m (5' 7\") 65.8 kg (145 lb) "       Physical Exam  Vitals: reviewed by me  General: Pt seen on hospital [facility], pleasant, cooperative, and alert to conversation  Eyes: Tracking well, clear conjunctiva BL  ENT: MMM, midline trachea.   Lungs:  No tachypnea, no accessory muscle use. No respiratory distress.   CV: Rate as above, regular rhythm.    Abd: Soft, non tender, no guarding, no rebound. Non distended  MSK: no peripheral edema or joint effusion.  No evidence of trauma  Skin: No rash, normal turgor and temperature  Neuro: Clear speech and no facial droop.  BUE and BLE with SILT and 5/5 motor  CN 2 - 12 in tact   Psych: Not RIS, no e/o AH/VH      Emergency Department Course   Interventions:  1435 NS 1L IV  1444 Imitrex 6 mg IV  1445 Toradol 15 mg IV  1446 Reglan 10 mg IV  1447 Magnesium sulfate 2 g IV    Emergency Department Course:  1430 Nursing notes and vitals reviewed.  I performed an exam of the patient as documented above.     IV inserted. Medicine administered as documented above.     1500 I rechecked the patient and discussed the results of her workup thus far.     Findings and plan explained to the Patient. Patient discharged home with instructions regarding supportive care, medications, and reasons to return. The importance of close follow-up was reviewed. The patient was prescribed Imitrex.    I personally answered all related questions prior to discharge.   Impression & Plan    Medical Decision Making:  Ary Lutz is a 23 year old female who presents with a headache, non thunderclap. She has a history of headaches.  I considered a broad differential diagnosis for this patient including tension, migraine, analgesic rebound, occipital neuralgia, etc.  I also considered other less common but serious causes considered included meningitis, encephalitis, subarachnoid bleed, temporal arteritis, stroke, tumor, etc.  She has no signs of serious headache etiologies at this point.  No advanced imaging is indicated, nor is CT/lumbar  puncture for SAH.  Her questions were answered and she feels improved after above interventions in ED.  Supportive outpatient management is therefore indicated.  Headache precautions given for home.      Critical Care time:  none    Diagnosis:    ICD-10-CM    1. Nonintractable headache, unspecified chronicity pattern, unspecified headache type R51        Disposition:  discharged to home    Discharge Medications:  Discharge Medication List as of 12/5/2018  4:06 PM      START taking these medications    Details   SUMAtriptan (IMITREX) 25 MG tablet Take 25-100mg one time. May repeat 25mg every two hours up to a maximum of 200mg in 24 hours., Disp-8 tablet, R-0, Local Print           Scribe Disclosure:   I, Fidelina Fortune, am serving as a scribe on 12/5/2018 at 2:27 PM to personally document services performed by Ramin Owen MD based on my observations and the provider's statements to me.     Fidelina Fortune  12/5/2018    EMERGENCY DEPARTMENT       Ramin Owen MD  12/05/18 8169

## 2019-01-07 ENCOUNTER — OFFICE VISIT (OUTPATIENT)
Dept: FAMILY MEDICINE | Facility: CLINIC | Age: 24
End: 2019-01-07
Payer: COMMERCIAL

## 2019-01-07 VITALS
DIASTOLIC BLOOD PRESSURE: 86 MMHG | SYSTOLIC BLOOD PRESSURE: 130 MMHG | TEMPERATURE: 97.5 F | BODY MASS INDEX: 23.23 KG/M2 | WEIGHT: 148 LBS | HEIGHT: 67 IN | HEART RATE: 86 BPM

## 2019-01-07 DIAGNOSIS — G43.009 MIGRAINE WITHOUT AURA AND WITHOUT STATUS MIGRAINOSUS, NOT INTRACTABLE: Primary | ICD-10-CM

## 2019-01-07 PROCEDURE — 99214 OFFICE O/P EST MOD 30 MIN: CPT | Performed by: NURSE PRACTITIONER

## 2019-01-07 RX ORDER — RIZATRIPTAN BENZOATE 10 MG/1
10 TABLET, ORALLY DISINTEGRATING ORAL
Qty: 12 TABLET | Refills: 3 | Status: SHIPPED | OUTPATIENT
Start: 2019-01-07 | End: 2020-01-13

## 2019-01-07 ASSESSMENT — MIFFLIN-ST. JEOR: SCORE: 1448.95

## 2019-01-07 NOTE — PROGRESS NOTES
SUBJECTIVE:                                                      Ary Lutz is a 23 year old female who presents to clinic today for the following health issues:    Headache  Onset: ongoing for several years     Description:   Location: Can be unilateral on each side of the face, maxillary , temporal    Character: sharp pain  Frequency:  Every couple of weeks   Duration:  Can be up to 2 days     Intensity: mild, moderate, severe    Progression of Symptoms:  intermittent    Accompanying Signs & Symptoms:  Stiff neck: YES  Neck or upper back pain: no  Fever: no  Sinus pressure: YES  Nausea or vomiting: YES- nausea   Dizziness: no - lightheadedness   Numbness: no  Weakness: no  Visual changes: no    History:   Head trauma: no  Family history of migraines: YES- mom   Previous tests for headaches: no  Neurologist evaluations: no  Able to do daily activities: YES  Wake with a headaches: YES  Do headaches wake you up: no  Daily pain medication use: YES  Work/school stressors/changes: no    Precipitating factors:   Does light make it worse: YES - sometimes   Does sound make it worse: YES    Alleviating factors:  Does sleep help: YES    Therapies Tried and outcome: mucinex sinus , Ibuprofen (Advil, Motrin) and Excedrin    HPI: Gets severe, unilateral headaches periodically (since childhood). Was never formally worked up for or diagnosed with migraine. Was seen in December in the ED for this issue. It was felt that she was suffering from migraine at that time. She was treated there (Imitrex, Toradol, Reglan, and Magnesium), and the pain resolved. She was prescribed a triptan, but she did not fill the prescription given that her symptoms were all but gone after that episode. Last Tuesday, however, she began experiencing similar symptoms. Of note, she started her menstrual period last Tuesday. She may have been starting her menses when the December headache started, as well, but she cannot recall for sure. She takes  "an OCP, but she denies ever experiencing aura before or during these headache episodes. Ibuprofen and Excedrin have helped minimally. She states that this current headache has almost resolved, but she would like to know if imaging of her head is appropriate. She would also like to make a plan to address future headaches.      Problem list and histories reviewed & adjusted, as indicated.  Additional history: as documented    Reviewed and updated as needed this visit by clinical staff  Tobacco  Allergies  Meds  Problems  Med Hx  Surg Hx  Fam Hx       Reviewed and updated as needed this visit by Provider  Tobacco  Allergies  Meds  Problems  Med Hx  Surg Hx  Fam Hx         ROS:  Constitutional, HEENT, GI, musculoskeletal, neuro systems are negative, except as otherwise noted.    OBJECTIVE:                                                      /86 (BP Location: Right arm, Patient Position: Chair, Cuff Size: Adult Regular)   Pulse 86   Temp 97.5  F (36.4  C) (Tympanic)   Ht 1.702 m (5' 7\")   Wt 67.1 kg (148 lb)   LMP 01/01/2019   Breastfeeding? No   BMI 23.18 kg/m   Body mass index is 23.18 kg/m .   GENERAL: healthy, alert, well nourished, well hydrated, no distress  HENT: ear canals- normal; TMs- normal; Nose- normal; Mouth- no ulcers, no lesions  NECK: no tenderness, no adenopathy, no asymmetry, no masses, no stiffness; thyroid- normal to palpation  RESP: lungs clear to auscultation - no rales, no rhonchi, no wheezes  CV: regular rates and rhythm, normal S1 S2, no S3 or S4 and no murmur, no click or rub -  NEURO: strength and tone- normal, sensory exam- grossly normal, mentation- intact, speech- normal, reflexes- symmetric  PSYCH: Alert and oriented times 3; speech- coherent , normal rate and volume; able to articulate logical thoughts, able to abstract reason, no tangential thoughts, no hallucinations or delusions, affect- normal    Diagnostic test results:  none     ASSESSMENT/PLAN:           "                                            Ary was seen today for headache. These are likely migraine headaches, based on her history. Hormone fluctuation / menstruation may be a primary trigger for these headaches. Given that this current headache episode has progressively resolved spontaneously, there is no indication for abortive therapy at this point. However, for future episodes, I have ordered rizatriptan ODT and instructed her on its use. I have also suggested paying close attention to other potential triggers, as well as remaining well-hydrated and well-slept. If she continues to only suffer from these at onset of menses, we may consider continuous OCP without placebo or other contraceptive modalities. She agrees with this approach. At this time, no indication for advanced imaging (longstanding headache with no new features / neuro changes). Discussed reasons to call or return to clinic. Joaquina acknowledges and demonstrates understanding of circumstances under which care should be sought urgently or emergently. Follow up as discussed. Discussed risks, benefits, alternatives, potential side effects, and proper administration of new medication / treatment. Agrees with plan of care. All questions answered.     Diagnoses and all orders for this visit:    Migraine without aura and without status migrainosus, not intractable  -     rizatriptan (MAXALT-MLT) 10 MG ODT; Take 1 tablet (10 mg) by mouth at onset of headache for migraine      Risks, benefits and alternatives of treatments discussed. Plan agreed upon and all questions answered.      Follow-Up: Return in about 4 weeks (around 2/4/2019).    See Patient Instructions      Nader Henderson, APRN, CNP

## 2019-01-07 NOTE — PATIENT INSTRUCTIONS
1. Pay attention to triggers. Get plenty of sleep and increase your fluid intake.  2. Use ibuprofen or Excedrin Migraine.  3. Once you are headache-free, you can start using Maxalt at the earliest sign of the next headache.  4. If you continue to get these during your menses, we can look at adjusting your OCP regimen.    UPPER RESPIRATORY INFECTION SUPPORTIVE CARES:  Stay hydrated (even if you're not thirsty)  Over-the-counter decongestants (phenylephrine or Sudafed) for sinus and nasal congestion  Warm compresses over sinuses, or allow warm shower water to run down face  Neti-Pot sinus / nasal rinse  Flonase or other intranasal steroid MAY help  Humidifier at night  Tylenol or ibuprofen for fever and aches & pains  Salt water gargles, Chloraseptic spray, or Cepacol lozenges for sore throat  Over-the-counter cough suppressants, cough drops, or honey for cough  Call or return to the clinic if you are getting worse or not getting better in the coming days  Go to the Emergency Room if you have trouble breathing or if your fever gets really high

## 2019-01-08 ENCOUNTER — MYC MEDICAL ADVICE (OUTPATIENT)
Dept: FAMILY MEDICINE | Facility: CLINIC | Age: 24
End: 2019-01-08

## 2019-01-08 DIAGNOSIS — G44.52 NEW DAILY PERSISTENT HEADACHE: Primary | ICD-10-CM

## 2019-01-08 NOTE — TELEPHONE ENCOUNTER
Please see MyChart message below and advise. As well as additional message copied below:    I know it is not needed at this moment, but considering the amount of headaches I get when I wake up on the morning, I would feel a lot better if I could get a CT scan of my head.       Inga Flower RN   [facility] - Triage

## 2019-01-08 NOTE — TELEPHONE ENCOUNTER
Please call and let her know that I ordered a head CT at [facility].     FV [phone] to schedule    Thanks.

## 2019-01-09 NOTE — TELEPHONE ENCOUNTER
Spoke with Chanel in Radiology Scheduling at [facility] and pt is scheduled for a CT on Tuesday, Reggie 15. Chanel will connect us with Registration. Spoke with Cody in registration about the PA. Cody states the PA does not need to be done. Pt has 2 insurances. [employer] [place] and a [employer] (primary) neither plan requires a PA. If pt has questions, they can call registration at [facility] and ask for Cody at [phone]  Jocelin Santana,

## 2019-01-09 NOTE — TELEPHONE ENCOUNTER
Please see [employer] message below:    I did call my insurance!  [employer] and they say said that the doctors office would have to call on their provider line: [phone] to be able to get pre authorization.  Are you able to do that on your end??    Is this appropriate? If so, would you like the team to initiate?    Inga Flower RN   [facility] - Triage

## 2019-01-09 NOTE — TELEPHONE ENCOUNTER
Called patient and advised of both Insurance companies not needing a PA for a Head CT. Patient advised understanding.    .Paola HOSKINS

## 2019-01-09 NOTE — TELEPHONE ENCOUNTER
Sure. I am not familiar with this process. Please call and ask what is needed on our end. Thanks.

## 2019-01-15 ENCOUNTER — HOSPITAL ENCOUNTER (OUTPATIENT)
Dept: CT IMAGING | Facility: CLINIC | Age: 24
Discharge: HOME | End: 2019-01-15
Payer: COMMERCIAL

## 2019-01-15 DIAGNOSIS — G44.52 NEW DAILY PERSISTENT HEADACHE: ICD-10-CM

## 2019-01-15 PROCEDURE — 70450 CT HEAD/BRAIN W/O DYE: CPT

## 2019-04-12 ENCOUNTER — TRANSFERRED RECORDS (OUTPATIENT)
Dept: HEALTH INFORMATION MANAGEMENT | Facility: CLINIC | Age: 24
End: 2019-04-12

## 2019-04-15 ENCOUNTER — OFFICE VISIT (OUTPATIENT)
Dept: FAMILY MEDICINE | Facility: CLINIC | Age: 24
End: 2019-04-15
Payer: COMMERCIAL

## 2019-04-15 VITALS
SYSTOLIC BLOOD PRESSURE: 122 MMHG | TEMPERATURE: 97.4 F | HEIGHT: 67 IN | OXYGEN SATURATION: 97 % | HEART RATE: 79 BPM | DIASTOLIC BLOOD PRESSURE: 78 MMHG | WEIGHT: 148 LBS | BODY MASS INDEX: 23.23 KG/M2

## 2019-04-15 DIAGNOSIS — Z00.00 ROUTINE HISTORY AND PHYSICAL EXAMINATION OF ADULT: Primary | ICD-10-CM

## 2019-04-15 DIAGNOSIS — N93.9 VAGINAL BLEEDING: ICD-10-CM

## 2019-04-15 DIAGNOSIS — Z30.41 ENCOUNTER FOR SURVEILLANCE OF CONTRACEPTIVE PILLS: ICD-10-CM

## 2019-04-15 DIAGNOSIS — Z11.3 SCREEN FOR STD (SEXUALLY TRANSMITTED DISEASE): ICD-10-CM

## 2019-04-15 DIAGNOSIS — F41.1 GAD (GENERALIZED ANXIETY DISORDER): ICD-10-CM

## 2019-04-15 LAB
CHOLEST SERPL-MCNC: 162 MG/DL
GLUCOSE SERPL-MCNC: 83 MG/DL (ref 70–99)
HDLC SERPL-MCNC: 81 MG/DL
LDLC SERPL CALC-MCNC: 64 MG/DL
NONHDLC SERPL-MCNC: 81 MG/DL
TRIGL SERPL-MCNC: 84 MG/DL

## 2019-04-15 PROCEDURE — 87491 CHLMYD TRACH DNA AMP PROBE: CPT | Performed by: FAMILY MEDICINE

## 2019-04-15 PROCEDURE — 87591 N.GONORRHOEAE DNA AMP PROB: CPT | Performed by: FAMILY MEDICINE

## 2019-04-15 PROCEDURE — 36415 COLL VENOUS BLD VENIPUNCTURE: CPT | Performed by: FAMILY MEDICINE

## 2019-04-15 PROCEDURE — 82947 ASSAY GLUCOSE BLOOD QUANT: CPT | Performed by: FAMILY MEDICINE

## 2019-04-15 PROCEDURE — 99395 PREV VISIT EST AGE 18-39: CPT | Performed by: FAMILY MEDICINE

## 2019-04-15 PROCEDURE — 99213 OFFICE O/P EST LOW 20 MIN: CPT | Mod: 25 | Performed by: FAMILY MEDICINE

## 2019-04-15 PROCEDURE — 80061 LIPID PANEL: CPT | Performed by: FAMILY MEDICINE

## 2019-04-15 RX ORDER — DROSPIRENONE AND ETHINYL ESTRADIOL 0.03MG-3MG
1 KIT ORAL DAILY
Qty: 84 TABLET | Refills: 3 | Status: SHIPPED | OUTPATIENT
Start: 2019-04-15 | End: 2020-05-19

## 2019-04-15 RX ORDER — FLUOXETINE 40 MG/1
CAPSULE ORAL
Qty: 90 CAPSULE | Refills: 2 | Status: SHIPPED | OUTPATIENT
Start: 2019-04-15 | End: 2019-04-15 | Stop reason: DRUGHIGH

## 2019-04-15 ASSESSMENT — ANXIETY QUESTIONNAIRES
3. WORRYING TOO MUCH ABOUT DIFFERENT THINGS: NOT AT ALL
1. FEELING NERVOUS, ANXIOUS, OR ON EDGE: NOT AT ALL
6. BECOMING EASILY ANNOYED OR IRRITABLE: SEVERAL DAYS
IF YOU CHECKED OFF ANY PROBLEMS ON THIS QUESTIONNAIRE, HOW DIFFICULT HAVE THESE PROBLEMS MADE IT FOR YOU TO DO YOUR WORK, TAKE CARE OF THINGS AT HOME, OR GET ALONG WITH OTHER PEOPLE: SOMEWHAT DIFFICULT
GAD7 TOTAL SCORE: 3
5. BEING SO RESTLESS THAT IT IS HARD TO SIT STILL: NOT AT ALL
7. FEELING AFRAID AS IF SOMETHING AWFUL MIGHT HAPPEN: SEVERAL DAYS
2. NOT BEING ABLE TO STOP OR CONTROL WORRYING: SEVERAL DAYS

## 2019-04-15 ASSESSMENT — PATIENT HEALTH QUESTIONNAIRE - PHQ9
5. POOR APPETITE OR OVEREATING: NOT AT ALL
SUM OF ALL RESPONSES TO PHQ QUESTIONS 1-9: 0

## 2019-04-15 ASSESSMENT — MIFFLIN-ST. JEOR: SCORE: 1448.95

## 2019-04-15 NOTE — PROGRESS NOTES
SUBJECTIVE:   CC: Ary Lutz is an 23 year old woman who presents for preventive health visit.     Healthy Habits:    Do you get at least three servings of calcium containing foods daily (dairy, green leafy vegetables, etc.)? yes    Amount of exercise or daily activities, outside of work: 3 day(s) per week    Problems taking medications regularly No    Medication side effects: No    Have you had an eye exam in the past two years? yes    Do you see a dentist twice per year? yes    Do you have sleep apnea, excessive snoring or daytime drowsiness?no      ED/UC Followup:    Facility:  [facility]   Date of visit: 4/12/2019   Reason for visit: Vaginal Bleeding   Current Status: not  bleeding anymore, but here for f/u      Was evaluated and treated for possible BV as cause of her sx, so she is still on treatment. No more spotting or bleeding, vaginal sx are feeling better/ no urinary sx otherwise ( ED notes reviewed in care everywhere  )   Had her normal pap last  so does not need one today. No other urinary sx, no abdominal pain etc today, although willing to get std check as well, just to make sure it is ok   Need refill on her birth control as well, working well for her   Also willing to lower the dose of prozac since she is doing well and feels like  she may not need as much medication now   See phq-9 and pancho 7      Today's PHQ-2 Score:   PHQ-2 ( 1999 [employer]) 4/15/2019 5/31/2018   Q1: Little interest or pleasure in doing things 0 0   Q2: Feeling down, depressed or hopeless 0 0   PHQ-2 Score 0 0       Abuse: Current or Past(Physical, Sexual or Emotional)- No  Do you feel safe in your environment? Yes    Social History     Tobacco Use     Smoking status: Never Smoker     Smokeless tobacco: Never Used   Substance Use Topics     Alcohol use: No     Alcohol/week: 0.0 oz     If you drink alcohol do you typically have >3 drinks per day or >7 drinks per week? No                     Reviewed orders with patient.   Reviewed health maintenance and updated orders accordingly - Yes  Patient Active Problem List   Diagnosis     SCOTT (generalized anxiety disorder)     Esophageal reflux     Panic attack     Past Surgical History:   Procedure Laterality Date     ESOPHAGOSCOPY, GASTROSCOPY, DUODENOSCOPY (EGD), COMBINED N/A 9/7/2017    Procedure: COMBINED ESOPHAGOSCOPY, GASTROSCOPY, DUODENOSCOPY (EGD), BIOPSY SINGLE OR MULTIPLE;  gastroscopy;  Surgeon: Hollis Prather MD;  [facility]       Social History     Tobacco Use     Smoking status: Never Smoker     Smokeless tobacco: Never Used   Substance Use Topics     Alcohol use: No     Alcohol/week: 0.0 oz     Family History   Problem Relation Age of Onset     Thyroid Cancer Mother      Familial Adenomatous Polyposis (FAP) Father      Colon Cancer Paternal Grandmother            Mammogram not appropriate for this patient based on age.    Pertinent mammograms are reviewed under the imaging tab.  History of abnormal Pap smear: NO - age 30- 65 PAP every 3 years recommended  PAP / HPV 9/25/2018   PAP NIL     Reviewed and updated as needed this visit by clinical staff         Reviewed and updated as needed this visit by Provider        Past Medical History:   Diagnosis Date     Anxiety      Chlamydia infection         ROS:  CONSTITUTIONAL: NEGATIVE for fever, chills, change in weight  INTEGUMENTARU/SKIN: NEGATIVE for worrisome rashes, moles or lesions  EYES: NEGATIVE for vision changes or irritation  ENT: NEGATIVE for ear, mouth and throat problems  RESP: NEGATIVE for significant cough or SOB  BREAST: NEGATIVE for masses, tenderness or discharge  CV: NEGATIVE for chest pain, palpitations or peripheral edema  GI: NEGATIVE for nausea, abdominal pain, heartburn, or change in bowel habits  : NEGATIVE for unusual urinary or vaginal symptoms. Periods are regular.  MUSCULOSKELETAL: NEGATIVE for significant arthralgias or myalgia  NEURO: NEGATIVE for weakness, dizziness or  paresthesias  ENDOCRINE: NEGATIVE for temperature intolerance, skin/hair changes  PSYCHIATRIC: NEGATIVE for changes in mood or affect    OBJECTIVE:   There were no vitals taken for this visit.  EXAM:  GENERAL: healthy, alert and no distress  EYES: Eyes grossly normal to inspection, PERRL and conjunctivae and sclerae normal  HENT: ear canals and TM's normal, nose and mouth without ulcers or lesions  NECK: no adenopathy, no asymmetry, masses, or scars and thyroid normal to palpation  RESP: lungs clear to auscultation - no rales, rhonchi or wheezes  BREAST: normal without masses, tenderness or nipple discharge and no palpable axillary masses or adenopathy  CV: regular rate and rhythm, normal S1 S2, no S3 or S4, no murmur, click or rub, no peripheral edema and peripheral pulses strong  ABDOMEN: soft, nontender, no hepatosplenomegaly, no masses and bowel sounds normal   (female): pt declined   RECTAL: deferred  MS: no gross musculoskeletal defects noted, no edema  SKIN: no suspicious lesions or rashes  NEURO: Normal strength and tone, mentation intact and speech normal  PSYCH: mentation appears normal, affect normal/bright        ASSESSMENT/PLAN:   (Z00.00) Routine history and physical examination of adult  (primary encounter diagnosis)  Comment:   Plan: Lipid panel reflex to direct LDL Fasting,         Glucose            (Z11.3) Screen for STD (sexually transmitted disease)  Comment:   Plan:  NEISSERIA GONORRHOEA PCR, CHLAMYDIA TRACHOMATIS        PCR    (N93.9) Vaginal bleeding  Comment: likely bv related , improving still using treatment . Also wish to get check for std   Plan: NEISSERIA GONORRHOEA PCR, CHLAMYDIA TRACHOMATIS        PCR        She is comfortable watching and plans to finish treatment. Check other labs. She will do follow up if no improvement or any ongoing problem     (Z30.41) Encounter for surveillance of contraceptive pills  Comment:   Plan: drospirenone-ethinyl estradiol (BRANDON) 3-0.03         MG  "tablet            (F41.1) SCOTT (generalized anxiety disorder)  Comment:   Plan: FLUoxetine (PROZAC) 20 MG capsule,         DISCONTINUED: FLUoxetine (PROZAC) 40 MG capsule            Discussed cares, talked about signs and symptoms of anxiety/ depression and treatment options. Willing to decrease the dose of Prozac 20 mg gradually . Pros/ cons of med's discussed.  spent sometimes counseling patient. Follow up in 8- 12 weeks , sooner if problem.         Patient expressed understanding and agreement with treatment plan. All patient's questions were answered, will let me know if has more later.  Medications: Rx's: Reviewed the potential side effects/complications of medications prescribed.     COUNSELING:   Reviewed preventive health counseling, as reflected in patient instructions       Regular exercise       Healthy diet/nutrition       Vision screening       Hearing screening       Immunizations UTD                Contraception       Osteoporosis Prevention/Bone Health       Safe sex practices/STD prevention       Consider Hep C screening for patients born between 1945 and 1965       HIV screeninx in teen years, 1x in adult years, and at intervals if high risk    BP Readings from Last 1 Encounters:   19 130/86     Estimated body mass index is 23.18 kg/m  as calculated from the following:    Height as of 19: 1.702 m (5' 7\").    Weight as of 19: 67.1 kg (148 lb).           reports that she has never smoked. She has never used smokeless tobacco.      Counseling Resources:  ATP IV Guidelines  Pooled Cohorts Equation Calculator  Breast Cancer Risk Calculator  FRAX Risk Assessment  ICSI Preventive Guidelines  Dietary Guidelines for Americans,   USDA's MyPlate  ASA Prophylaxis  Lung CA Screening    Binta Beach MD  [facility]  "

## 2019-04-16 ASSESSMENT — ANXIETY QUESTIONNAIRES: GAD7 TOTAL SCORE: 3

## 2019-04-29 ENCOUNTER — OFFICE VISIT (OUTPATIENT)
Dept: FAMILY MEDICINE | Facility: CLINIC | Age: 24
End: 2019-04-29
Payer: COMMERCIAL

## 2019-04-29 VITALS
HEART RATE: 87 BPM | SYSTOLIC BLOOD PRESSURE: 114 MMHG | DIASTOLIC BLOOD PRESSURE: 76 MMHG | BODY MASS INDEX: 23.23 KG/M2 | WEIGHT: 148 LBS | OXYGEN SATURATION: 97 % | TEMPERATURE: 98.1 F | HEIGHT: 67 IN

## 2019-04-29 DIAGNOSIS — N89.8 VAGINAL IRRITATION: Primary | ICD-10-CM

## 2019-04-29 DIAGNOSIS — B96.89 BV (BACTERIAL VAGINOSIS): ICD-10-CM

## 2019-04-29 DIAGNOSIS — N76.0 BV (BACTERIAL VAGINOSIS): ICD-10-CM

## 2019-04-29 LAB
SPECIMEN SOURCE: ABNORMAL
WET PREP SPEC: ABNORMAL

## 2019-04-29 PROCEDURE — 99213 OFFICE O/P EST LOW 20 MIN: CPT | Performed by: FAMILY MEDICINE

## 2019-04-29 PROCEDURE — 87210 SMEAR WET MOUNT SALINE/INK: CPT | Performed by: FAMILY MEDICINE

## 2019-04-29 RX ORDER — METRONIDAZOLE 500 MG/1
500 TABLET ORAL 2 TIMES DAILY
Qty: 14 TABLET | Refills: 0 | Status: SHIPPED | OUTPATIENT
Start: 2019-04-29 | End: 2019-09-04

## 2019-04-29 ASSESSMENT — MIFFLIN-ST. JEOR: SCORE: 1448.95

## 2019-04-29 NOTE — PATIENT INSTRUCTIONS
Patient Education     Vaginal Infection: Bacterial Vaginosis  Both good and bad bacteria are present in a healthy vagina. Bacterial vaginosis (BV) occurs when these bacteria get out of balance. The numbers of good bacteria decrease. This allows the numbers of bad bacteria to increase and cause BV. In most cases, BV is not a serious problem.  Causes of bacterial vaginosis  The cause of BV is not clear. Douching may lead to it. Having sex with a new partner or more than 1 partner makes it more likely.  Symptoms of bacterial vaginosis  Symptoms of BV vary for each woman. Some women have few symptoms or none at all. If symptoms are present, they can include:    Thin, milky white or gray or sometimes green discharge    Unpleasant  fishy  odor    Irritation, itching, and burning at opening of vagina which may indicate mixed vaginitis      Burning or irritation with sex or urination which may indicate mixed vaginitis  Diagnosing bacterial vaginosis  Your healthcare provider will ask about your symptoms and health history. He or she will also do a pelvic exam. This is an exam of your vagina and cervix. A sample of vaginal fluid or discharge may be taken. This sample is checked for signs of BV.  Treating bacterial vaginosis  BV is often treated with antibiotics. They may be given in oral pill form or as a vaginal cream. To use these medicines:    Be sure to take all of your medicine, even if your symptoms go away.    If you re taking antibiotic pills, do not drink alcohol until you re finished with all of your medicine.    If you re using vaginal cream, apply it as directed. Be aware that the cream may make condoms and diaphragms less effective.    Call your healthcare provider if symptoms do not go away within 4 days of starting treatment. Also call if you have a reaction to the medicine.  Why treatment matters  Even if you have no symptoms or your symptoms are mild, BV should be treated. Untreated BV can lead to health  problems such as:    Increased risk of  delivery if you re pregnant    Increased risk of complications after surgery on the reproductive organs    Possible increased risk of pelvic inflammatory disease (PID)   Date Last Reviewed: 3/1/2017    4413-7243 [employer]. [address] [place] [address], [place] [address]. All rights reserved. This information is not intended as a substitute for professional medical care. Always follow your healthcare professional's instructions.

## 2019-04-29 NOTE — PROGRESS NOTES
SUBJECTIVE:   Ary Lutz is a 23 year old female who presents to clinic today for the following   health issues:      Vaginal Symptoms      Duration: recheck     Description  Itching- mild finished taken  diflucan 1 dose last week ,  She thinks diflucan has helped with sx and she is feeling much better but just wants to do test again to make sure she is better . Has very minimal irritation but no urinary sx otherwise. Has been treated for BV and yeast previously as well so just worried and wants to get tested     Intensity:  mild    Accompanying signs and symptoms (fever/dysuria/abdominal or back pain): None    History  Sexually active: yes,   Possibility of pregnancy: No  Recent antibiotic use: no     Precipitating or alleviating factors: None    Therapies tried and outcome: Diflucan   Outcome: effective           Additional history: as documented    Reviewed  and updated as needed this visit by clinical staff         Reviewed and updated as needed this visit by Provider         Patient Active Problem List   Diagnosis     SCOTT (generalized anxiety disorder)     Esophageal reflux     Panic attack     Past Surgical History:   Procedure Laterality Date     ESOPHAGOSCOPY, GASTROSCOPY, DUODENOSCOPY (EGD), COMBINED N/A 9/7/2017    Procedure: COMBINED ESOPHAGOSCOPY, GASTROSCOPY, DUODENOSCOPY (EGD), BIOPSY SINGLE OR MULTIPLE;  gastroscopy;  Surgeon: Hollis Prather MD;  Location:  [facility]       Social History     Tobacco Use     Smoking status: Never Smoker     Smokeless tobacco: Never Used   Substance Use Topics     Alcohol use: No     Alcohol/week: 0.0 oz     Family History   Problem Relation Age of Onset     Thyroid Cancer Mother      Familial Adenomatous Polyposis (FAP) Father      Hyperlipidemia Father      Colon Cancer Paternal Grandmother         at age 63      Diabetes No family hx of      Coronary Artery Disease No family hx of      Hypertension No family hx of      Cerebrovascular Disease No family  "hx of            ROS:  Constitutional, HEENT, cardiovascular, pulmonary, GI, , musculoskeletal, neuro, skin, endocrine and psych systems are negative, except as otherwise noted.    OBJECTIVE:     /76   Pulse 87   Temp 98.1  F (36.7  C) (Tympanic)   Ht 1.702 m (5' 7\")   Wt 67.1 kg (148 lb)   LMP 04/23/2019   SpO2 97%   BMI 23.18 kg/m    Body mass index is 23.18 kg/m .  GENERAL: healthy, alert and no distress  RESP: lungs clear to auscultation - no rales, rhonchi or wheezes  CV: regular rate and rhythm, normal S1 S2, no S3 or S4, no murmur,  ABDOMEN: soft, nontender, no hepatosplenomegaly, no masses and bowel sounds normal   (female): normal female external genitalia, normal urethral meatus , vaginal mucosa pink, moist, has small pale white frothy discharge, wet prep taken well rugated and normal cervix, adnexae, and uterus without masses.    Diagnostic Test Results:  Wet prep ave for clue cells     ASSESSMENT/PLAN:         (N89.8) Vaginal irritation  (primary encounter diagnosis)  Comment:   Plan: Wet prep, metroNIDAZOLE (FLAGYL) 500 MG tablet            (N76.0,  B96.89) BV (bacterial vaginosis)  Comment:   Plan: metroNIDAZOLE (FLAGYL) 500 MG tablet            Script sent .Cares and  treatment discussed. followup if problem   Patient expressed understanding and agreement with treatment plan. All patient's questions were answered, will let me know if has more later.  Medications: Rx's: Reviewed the potential side effects/complications of medications prescribed.     Binta Beach MD  [facility]        "

## 2019-05-06 ENCOUNTER — MYC REFILL (OUTPATIENT)
Dept: FAMILY MEDICINE | Facility: CLINIC | Age: 24
End: 2019-05-06

## 2019-05-06 DIAGNOSIS — F41.0 PANIC ATTACK: ICD-10-CM

## 2019-05-07 RX ORDER — HYDROXYZINE HYDROCHLORIDE 25 MG/1
50 TABLET, FILM COATED ORAL EVERY 8 HOURS PRN
Qty: 60 TABLET | Refills: 0 | Status: SHIPPED | OUTPATIENT
Start: 2019-05-07 | End: 2019-11-04

## 2019-05-07 NOTE — TELEPHONE ENCOUNTER
"Last Written Prescription Date:  05/14/18  Last Fill Quantity: 60,  # refills: 0   Last office visit: 4/29/2019 with prescribing provider:     Future Office Visit:    Requested Prescriptions   Pending Prescriptions Disp Refills     hydrOXYzine (ATARAX) 25 MG tablet 60 tablet 0       Antihistamines Protocol Passed - 5/6/2019  8:10 PM        Passed - Recent (12 mo) or future (30 days) visit within the authorizing provider's specialty     Patient had office visit in the last 12 months or has a visit in the next 30 days with authorizing provider or within the authorizing provider's specialty.  See \"Patient Info\" tab in inbasket, or \"Choose Columns\" in Meds & Orders section of the refill encounter.              Passed - Patient is age 3 or older     Apply age and/or weight-based dosing for peds patients age 3 and older.    Forward request to provider for patients under the age of 3.          Passed - Medication is active on med list          "

## 2019-09-04 ENCOUNTER — OFFICE VISIT (OUTPATIENT)
Dept: FAMILY MEDICINE | Facility: CLINIC | Age: 24
End: 2019-09-04
Payer: COMMERCIAL

## 2019-09-04 VITALS
BODY MASS INDEX: 23.07 KG/M2 | SYSTOLIC BLOOD PRESSURE: 110 MMHG | TEMPERATURE: 97 F | HEART RATE: 106 BPM | WEIGHT: 147 LBS | OXYGEN SATURATION: 100 % | RESPIRATION RATE: 12 BRPM | HEIGHT: 67 IN | DIASTOLIC BLOOD PRESSURE: 62 MMHG

## 2019-09-04 DIAGNOSIS — Z11.3 SCREEN FOR STD (SEXUALLY TRANSMITTED DISEASE): Primary | ICD-10-CM

## 2019-09-04 PROCEDURE — 86803 HEPATITIS C AB TEST: CPT | Performed by: FAMILY MEDICINE

## 2019-09-04 PROCEDURE — 86780 TREPONEMA PALLIDUM: CPT | Performed by: FAMILY MEDICINE

## 2019-09-04 PROCEDURE — 86696 HERPES SIMPLEX TYPE 2 TEST: CPT | Performed by: FAMILY MEDICINE

## 2019-09-04 PROCEDURE — 87591 N.GONORRHOEAE DNA AMP PROB: CPT | Performed by: FAMILY MEDICINE

## 2019-09-04 PROCEDURE — 86706 HEP B SURFACE ANTIBODY: CPT | Performed by: FAMILY MEDICINE

## 2019-09-04 PROCEDURE — 36415 COLL VENOUS BLD VENIPUNCTURE: CPT | Performed by: FAMILY MEDICINE

## 2019-09-04 PROCEDURE — 87389 HIV-1 AG W/HIV-1&-2 AB AG IA: CPT | Performed by: FAMILY MEDICINE

## 2019-09-04 PROCEDURE — 87340 HEPATITIS B SURFACE AG IA: CPT | Performed by: FAMILY MEDICINE

## 2019-09-04 PROCEDURE — 86704 HEP B CORE ANTIBODY TOTAL: CPT | Performed by: FAMILY MEDICINE

## 2019-09-04 PROCEDURE — 99213 OFFICE O/P EST LOW 20 MIN: CPT | Performed by: FAMILY MEDICINE

## 2019-09-04 PROCEDURE — 87491 CHLMYD TRACH DNA AMP PROBE: CPT | Performed by: FAMILY MEDICINE

## 2019-09-04 PROCEDURE — 86695 HERPES SIMPLEX TYPE 1 TEST: CPT | Performed by: FAMILY MEDICINE

## 2019-09-04 ASSESSMENT — MIFFLIN-ST. JEOR: SCORE: 1439.42

## 2019-09-04 NOTE — PROGRESS NOTES
Subjective     Ary Lutz is a 24 year old female who presents to clinic today for the following health issues:    HPI   Would like STD check. She is not having any symptoms.  She had unprotected sex about 2 weeks ago.  She has had a history of chlamydia in the past.        Patient Active Problem List   Diagnosis     SCOTT (generalized anxiety disorder)     Esophageal reflux     Panic attack     Past Surgical History:   Procedure Laterality Date     ESOPHAGOSCOPY, GASTROSCOPY, DUODENOSCOPY (EGD), COMBINED N/A 9/7/2017    Procedure: COMBINED ESOPHAGOSCOPY, GASTROSCOPY, DUODENOSCOPY (EGD), BIOPSY SINGLE OR MULTIPLE;  gastroscopy;  Surgeon: Hollis Prather MD;  Location:  [facility]       Social History     Tobacco Use     Smoking status: Never Smoker     Smokeless tobacco: Never Used   Substance Use Topics     Alcohol use: No     Alcohol/week: 0.0 oz     Family History   Problem Relation Age of Onset     Thyroid Cancer Mother      Familial Adenomatous Polyposis (FAP) Father      Hyperlipidemia Father      Colon Cancer Paternal Grandmother         at age 63      Diabetes No family hx of      Coronary Artery Disease No family hx of      Hypertension No family hx of      Cerebrovascular Disease No family hx of          Current Outpatient Medications   Medication Sig Dispense Refill     drospirenone-ethinyl estradiol (BRANDON) 3-0.03 MG tablet Take 1 tablet by mouth daily 84 tablet 3     FLUoxetine (PROZAC) 20 MG capsule Take 1 capsule (20 mg) by mouth daily 60 capsule 1     hydrOXYzine (ATARAX) 25 MG tablet Take 2 tablets (50 mg) by mouth every 8 hours as needed for itching 60 tablet 0     ibuprofen (ADVIL/MOTRIN) 800 MG tablet Take 1 tablet (800 mg) by mouth every 8 hours as needed for pain 40 tablet 1     pantoprazole (PROTONIX) 40 MG EC tablet TAKE 1 TABLET BY MOUTH DAILY 90 tablet 3     rizatriptan (MAXALT-MLT) 10 MG ODT Take 1 tablet (10 mg) by mouth at onset of headache for migraine 12 tablet 3     Allergies  "  Allergen Reactions     No Clinical Screening - See Comments      PN: LW CM1: CONTRAST- nka Reaction :     Bactrim [Sulfamethoxazole W/Trimethoprim] Rash         Reviewed and updated as needed this visit by Provider  Allergies  Problems         Review of Systems   ROS COMP: CONSTITUTIONAL: NEGATIVE for fever, chills, change in weight  ENT/MOUTH: NEGATIVE for ear, mouth and throat problems  RESP: NEGATIVE for significant cough or SOB  CV: NEGATIVE for chest pain, palpitations or peripheral edema      Objective    /62   Pulse 106   Temp 97  F (36.1  C) (Tympanic)   Resp 12   Ht 1.702 m (5' 7\")   Wt 66.7 kg (147 lb)   LMP 08/11/2019   SpO2 100%   BMI 23.02 kg/m    Body mass index is 23.02 kg/m .  Physical Exam   GENERAL: healthy, alert and no distress  NECK: no adenopathy, no asymmetry, masses, or scars and thyroid normal to palpation  RESP: lungs clear to auscultation - no rales, rhonchi or wheezes  CV: regular rate and rhythm, normal S1 S2, no S3 or S4, no murmur, click or rub, no peripheral edema and peripheral pulses strong  ABDOMEN: soft, nontender, no hepatosplenomegaly, no masses and bowel sounds normal            Assessment & Plan     1. Screen for STD (sexually transmitted disease)  Screening ordered per patient's request.  Safe sex practices discussed.  - NEISSERIA GONORRHOEA PCR  - CHLAMYDIA TRACHOMATIS PCR  - Hepatitis B core antibody  - Hepatitis B Surface Antibody  - Hepatitis B surface antigen  - Hepatitis C antibody  - HIV Antigen Antibody Combo  - Herpes Simplex Virus 1 and 2 IgG  - Treponema Abs w Reflex to RPR and Titer         Tamir Loyd MD  [facility]      "

## 2019-09-05 LAB
C TRACH DNA SPEC QL NAA+PROBE: NEGATIVE
HBV CORE AB SERPL QL IA: NONREACTIVE
HBV SURFACE AB SERPL IA-ACNC: 2.98 M[IU]/ML
HBV SURFACE AG SERPL QL IA: NONREACTIVE
HCV AB SERPL QL IA: NONREACTIVE
HIV 1+2 AB+HIV1 P24 AG SERPL QL IA: NONREACTIVE
HSV1 IGG SERPL QL IA: 2.1 AI (ref 0–0.8)
HSV2 IGG SERPL QL IA: <0.2 AI (ref 0–0.8)
N GONORRHOEA DNA SPEC QL NAA+PROBE: NEGATIVE
SPECIMEN SOURCE: NORMAL
SPECIMEN SOURCE: NORMAL
T PALLIDUM AB SER QL: NONREACTIVE

## 2019-10-08 DIAGNOSIS — K21.9 GASTROESOPHAGEAL REFLUX DISEASE WITHOUT ESOPHAGITIS: ICD-10-CM

## 2019-10-08 RX ORDER — PANTOPRAZOLE SODIUM 40 MG/1
TABLET, DELAYED RELEASE ORAL
Qty: 90 TABLET | Refills: 3 | Status: SHIPPED | OUTPATIENT
Start: 2019-10-08 | End: 2020-09-01

## 2019-10-08 NOTE — TELEPHONE ENCOUNTER
"Requested Prescriptions   Pending Prescriptions Disp Refills     pantoprazole (PROTONIX) 40 MG EC tablet [Pharmacy Med Name: PANTOPRAZOLE SOD DR 40 MG TAB] 90 tablet 3     Sig: TAKE 1 TABLET BY MOUTH DAILY  Last Written Prescription Date:  10/23/18  Last Fill Quantity: 90,  # refills: 3   Last office visit: 9/4/2019 with prescribing provider:  Evert   Future Office Visit:           PPI Protocol Passed - 10/8/2019  1:23 AM        Passed - Not on Clopidogrel (unless Pantoprazole ordered)        Passed - No diagnosis of osteoporosis on record        Passed - Recent (12 mo) or future (30 days) visit within the authorizing provider's specialty     Patient has had an office visit with the authorizing provider or a provider within the authorizing providers department within the previous 12 mos or has a future within next 30 days. See \"Patient Info\" tab in inbasket, or \"Choose Columns\" in Meds & Orders section of the refill encounter.              Passed - Medication is active on med list        Passed - Patient is age 18 or older        Passed - No active pregnacy on record        Passed - No positive pregnancy test in past 12 months          "

## 2019-10-23 NOTE — TELEPHONE ENCOUNTER
Pharmacy note:    Alternative Requested: [place] Required. Insurance covers 90 days/ year.    BIN: [id number] PCN: [id number] GRP: [id number]    ID: [id number]    [facility] # [id number]

## 2019-11-04 ENCOUNTER — MYC REFILL (OUTPATIENT)
Dept: FAMILY MEDICINE | Facility: CLINIC | Age: 24
End: 2019-11-04

## 2019-11-04 DIAGNOSIS — F41.0 PANIC ATTACK: ICD-10-CM

## 2019-11-05 ENCOUNTER — TELEPHONE (OUTPATIENT)
Dept: FAMILY MEDICINE | Facility: CLINIC | Age: 24
End: 2019-11-05

## 2019-11-05 RX ORDER — HYDROXYZINE HYDROCHLORIDE 25 MG/1
50 TABLET, FILM COATED ORAL EVERY 8 HOURS PRN
Qty: 60 TABLET | Refills: 0 | Status: SHIPPED | OUTPATIENT
Start: 2019-11-05 | End: 2020-02-15

## 2019-11-05 NOTE — TELEPHONE ENCOUNTER
Prior Authorization Retail Medication Request    Medication/Dose: pantoprazole 40 mg  ICD code (if different than what is on RX):    Previously Tried and Failed:    Rationale:      Insurance Name:  [employer]  Insurance ID:  [id number]       Pharmacy Information (if different than what is on RX)  Name:  [facility]  Phone:  [phone]

## 2019-11-05 NOTE — TELEPHONE ENCOUNTER
"Requested Prescriptions   Pending Prescriptions Disp Refills     hydrOXYzine (ATARAX) 25 MG tablet 60 tablet 0     Sig: Take 2 tablets (50 mg) by mouth every 8 hours as needed for itching   Last Written Prescription Date:  5/7/19  Last Fill Quantity: 60,  # refills: 0   Last office visit: 9/4/2019 with prescribing provider:  Evert    Future Office Visit:        Antihistamines Protocol Passed - 11/4/2019 12:39 PM        Passed - Recent (12 mo) or future (30 days) visit within the authorizing provider's specialty     Patient has had an office visit with the authorizing provider or a provider within the authorizing providers department within the previous 12 mos or has a future within next 30 days. See \"Patient Info\" tab in inbasket, or \"Choose Columns\" in Meds & Orders section of the refill encounter.              Passed - Patient is age 3 or older     Apply age and/or weight-based dosing for peds patients age 3 and older.    Forward request to provider for patients under the age of 3.          Passed - Medication is active on med list          "

## 2019-11-10 ENCOUNTER — MYC REFILL (OUTPATIENT)
Dept: FAMILY MEDICINE | Facility: CLINIC | Age: 24
End: 2019-11-10

## 2019-11-10 DIAGNOSIS — K21.9 GASTROESOPHAGEAL REFLUX DISEASE WITHOUT ESOPHAGITIS: ICD-10-CM

## 2019-11-10 RX ORDER — PANTOPRAZOLE SODIUM 40 MG/1
40 TABLET, DELAYED RELEASE ORAL DAILY
Qty: 90 TABLET | Refills: 3 | Status: CANCELLED | OUTPATIENT
Start: 2019-11-10

## 2019-11-11 NOTE — TELEPHONE ENCOUNTER
"Requested Prescriptions   Pending Prescriptions Disp Refills     pantoprazole (PROTONIX) 40 MG EC tablet 90 tablet 3     Sig: Take 1 tablet (40 mg) by mouth daily   Last Written Prescription Date:  10/8/19  Last Fill Quantity: 90,  # refills: 3   Last office visit: 9/4/2019 with prescribing provider:  Evert    Future Office Visit:        PPI Protocol Passed - 11/10/2019  8:18 PM        Passed - Not on Clopidogrel (unless Pantoprazole ordered)        Passed - No diagnosis of osteoporosis on record        Passed - Recent (12 mo) or future (30 days) visit within the authorizing provider's specialty     Patient has had an office visit with the authorizing provider or a provider within the authorizing providers department within the previous 12 mos or has a future within next 30 days. See \"Patient Info\" tab in inbasket, or \"Choose Columns\" in Meds & Orders section of the refill encounter.              Passed - Medication is active on med list        Passed - Patient is age 18 or older        Passed - No active pregnacy on record        Passed - No positive pregnancy test in past 12 months          "

## 2019-12-04 ENCOUNTER — MYC REFILL (OUTPATIENT)
Dept: FAMILY MEDICINE | Facility: CLINIC | Age: 24
End: 2019-12-04

## 2019-12-04 DIAGNOSIS — Z30.41 ENCOUNTER FOR SURVEILLANCE OF CONTRACEPTIVE PILLS: ICD-10-CM

## 2019-12-05 RX ORDER — DROSPIRENONE AND ETHINYL ESTRADIOL 0.03MG-3MG
1 KIT ORAL DAILY
Qty: 84 TABLET | Refills: 3 | OUTPATIENT
Start: 2019-12-05

## 2019-12-05 NOTE — TELEPHONE ENCOUNTER
"Requested Prescriptions   Pending Prescriptions Disp Refills     drospirenone-ethinyl estradiol (BRANDON) 3-0.03 MG tablet 84 tablet 3     Sig: Take 1 tablet by mouth daily  Last Written Prescription Date:  4/15/19  Last Fill Quantity: 84,  # refills: 3   Last office visit: 9/4/2019 with prescribing provider:  Evert   Future Office Visit:           Contraceptives Protocol Passed - 12/4/2019  6:43 PM        Passed - Patient is not a current smoker if age is 35 or older        Passed - Recent (12 mo) or future (30 days) visit within the authorizing provider's specialty     Patient has had an office visit with the authorizing provider or a provider within the authorizing providers department within the previous 12 mos or has a future within next 30 days. See \"Patient Info\" tab in inbasket, or \"Choose Columns\" in Meds & Orders section of the refill encounter.              Passed - Medication is active on med list        Passed - No active pregnancy on record        Passed - No positive pregnancy test in past 12 months        Rx denied.  Should have refills at pharmacy from 4/15/19 rx.  Pt notified via my chart message.    Sarah VILLATORO RN  EP Triage    "

## 2020-01-13 ENCOUNTER — OFFICE VISIT (OUTPATIENT)
Dept: FAMILY MEDICINE | Facility: CLINIC | Age: 25
End: 2020-01-13
Payer: COMMERCIAL

## 2020-01-13 ENCOUNTER — ANCILLARY PROCEDURE (OUTPATIENT)
Dept: GENERAL RADIOLOGY | Facility: CLINIC | Age: 25
End: 2020-01-13
Attending: INTERNAL MEDICINE
Payer: COMMERCIAL

## 2020-01-13 VITALS
OXYGEN SATURATION: 97 % | TEMPERATURE: 98.7 F | WEIGHT: 149 LBS | SYSTOLIC BLOOD PRESSURE: 102 MMHG | HEART RATE: 97 BPM | DIASTOLIC BLOOD PRESSURE: 80 MMHG | BODY MASS INDEX: 23.34 KG/M2

## 2020-01-13 DIAGNOSIS — R07.9 LEFT-SIDED CHEST PAIN: ICD-10-CM

## 2020-01-13 DIAGNOSIS — R07.9 LEFT-SIDED CHEST PAIN: Primary | ICD-10-CM

## 2020-01-13 PROCEDURE — 99203 OFFICE O/P NEW LOW 30 MIN: CPT | Performed by: INTERNAL MEDICINE

## 2020-01-13 PROCEDURE — 71046 X-RAY EXAM CHEST 2 VIEWS: CPT | Mod: FY

## 2020-01-13 NOTE — PATIENT INSTRUCTIONS
Try aleve 1-2 tabs twice daily every day with meals for one week, then can use as needed.  Continue ice and/or heating pad.  If pain not improving in the next couple weeks, we can consider further imaging.

## 2020-01-13 NOTE — PROGRESS NOTES
Subjective     Ary Lutz is a 24 year old female who presents to clinic today for the following health issues:    HPI   Concern - pressure under left breast   Onset: 2 months     Description:    pt is having pressure under left breast     Intensity: mild    Progression of Symptoms:  same    Accompanying Signs & Symptoms:  Back pain on the left side     Previous history of similar problem:       Precipitating factors:   Worsened by:     Alleviating factors:  Improved by:     Therapies Tried and outcome: cold packs       Joaquina is here with a left-sided chest wall pain for the past month.  There were no preceding injuries or new activities.  She does not do anything for regular exercise.  She has a desk job.  Describes it as sort of a tightness/stiffness right under the left breast.  Sometimes radiates to her back or the neck.  It comes and goes throughout the day, present more often than not.  There does not seem to be any pattern to what makes it come or go.  Stretching does not seem to help.  Sometimes it is worse when she takes a deep breath.  She is tried ibuprofen and ice, neither are particularly helpful.  She denies any fevers, chills, cough.  She does note some palpitations, but thinks this may be because she is anxious about the pain.  Discomfort is not gotten better or worse since it started.    Reviewed and updated as needed this visit by Provider         Review of Systems   Const, msk, cv, pulm reviewed,  otherwise negative unless noted above.        Objective    /80   Pulse 97   Temp 98.7  F (37.1  C) (Oral)   Wt 67.6 kg (149 lb)   LMP 12/31/2019   SpO2 97%   BMI 23.34 kg/m    Body mass index is 23.34 kg/m .  Physical Exam   Gen: well appearing, pleasant young woman, no distress  Pulm: breathing comfortably, CTAB, no wheezes or rales  CV: borderline tachycardia, regular, normal S1 and S2, no murmurs  L breast: no masses or tenderness.   Chest wall: area of discomfort at border of  left breast inferior outer portion, but not really made worse with palpation     Diagnostic Test Results:  Labs reviewed in Epic  CXR - unremarkable        Assessment & Plan     1. Left-sided chest pain  Dull left sided chest pain. The xray is normal. Since the pain is pretty mild, recommended 1 week of scheduled NSAIDs, continue ice and heat.  If pain not improving after NSAIDs, consider further imaging.   - XR Chest 2 Views; Future         Return in about 2 weeks (around 1/27/2020) for If no improvement.    Elen Baldwin MD  [facility] [place]

## 2020-01-28 ENCOUNTER — HOSPITAL ENCOUNTER (OUTPATIENT)
Dept: MAMMOGRAPHY | Facility: CLINIC | Age: 25
Discharge: HOME | End: 2020-01-28
Attending: INTERNAL MEDICINE | Admitting: INTERNAL MEDICINE
Payer: COMMERCIAL

## 2020-01-28 DIAGNOSIS — R07.9 LEFT-SIDED CHEST PAIN: ICD-10-CM

## 2020-01-28 PROCEDURE — 76642 ULTRASOUND BREAST LIMITED: CPT | Mod: LT

## 2020-02-15 ENCOUNTER — MYC REFILL (OUTPATIENT)
Dept: FAMILY MEDICINE | Facility: CLINIC | Age: 25
End: 2020-02-15

## 2020-02-15 DIAGNOSIS — F41.0 PANIC ATTACK: ICD-10-CM

## 2020-02-17 RX ORDER — HYDROXYZINE HYDROCHLORIDE 25 MG/1
50 TABLET, FILM COATED ORAL EVERY 8 HOURS PRN
Qty: 60 TABLET | Refills: 0 | Status: SHIPPED | OUTPATIENT
Start: 2020-02-17 | End: 2020-10-23

## 2020-02-17 NOTE — TELEPHONE ENCOUNTER
"Requested Prescriptions   Pending Prescriptions Disp Refills     hydrOXYzine (ATARAX) 25 MG tablet 60 tablet 0     Sig: Take 2 tablets (50 mg) by mouth every 8 hours as needed for itching       Antihistamines Protocol Passed - 2/15/2020  2:16 PM        Passed - Recent (12 mo) or future (30 days) visit within the authorizing provider's specialty     Patient has had an office visit with the authorizing provider or a provider within the authorizing providers department within the previous 12 mos or has a future within next 30 days. See \"Patient Info\" tab in inbasket, or \"Choose Columns\" in Meds & Orders section of the refill encounter.              Passed - Patient is age 3 or older     Apply age and/or weight-based dosing for peds patients age 3 and older.    Forward request to provider for patients under the age of 3.          Passed - Medication is active on med list        hydrOXYzine (ATARAX) 25 MG tablet 60 tablet 0 11/5/2019       Last Written Prescription Date:  11/5/2019  Last Fill Quantity: 60,  # refills: 0   Last office visit: 1/13/2020 with prescribing provider: Dr. Baldwin   Future Office Visit:  Unknown     "

## 2020-03-09 DIAGNOSIS — F41.1 GAD (GENERALIZED ANXIETY DISORDER): ICD-10-CM

## 2020-03-09 NOTE — TELEPHONE ENCOUNTER
"Requested Prescriptions   Pending Prescriptions Disp Refills     FLUoxetine (PROZAC) 40 MG capsule [Pharmacy Med Name: FLUOXETINE HCL 40 MG CAPSULE] 90 capsule 2     Sig: TAKE 1 CAPSULE BY MOUTH EVERY DAY  Last Written Prescription Date:  4/15/19  Last Fill Quantity: 60,  # refills: 1   Last office visit: 1/13/2020 with prescribing provider:  Nicolasa   Future Office Visit:           SSRIs Protocol Passed - 3/9/2020  2:12 AM   PHQ 5/31/2018 10/9/2018 4/15/2019   PHQ-9 Total Score 2 4 0   Q9: Thoughts of better off dead/self-harm past 2 weeks Not at all Not at all Not at all        Passed - Recent (12 mo) or future (30 days) visit within the authorizing provider's specialty     Patient has had an office visit with the authorizing provider or a provider within the authorizing providers department within the previous 12 mos or has a future within next 30 days. See \"Patient Info\" tab in inbasket, or \"Choose Columns\" in Meds & Orders section of the refill encounter.              Passed - Medication is active on med list        Passed - Patient is age 18 or older        Passed - No active pregnancy on record        Passed - No positive pregnancy test in last 12 months             "

## 2020-03-10 RX ORDER — FLUOXETINE 40 MG/1
CAPSULE ORAL
Qty: 90 CAPSULE | Refills: 1 | Status: SHIPPED | OUTPATIENT
Start: 2020-03-10 | End: 2020-09-01

## 2020-03-10 NOTE — TELEPHONE ENCOUNTER
Prescription approved per [facility] Refill Protocol.    Anay Mcconnell RN, BSN  [facility] [place]

## 2020-04-20 ENCOUNTER — E-VISIT (OUTPATIENT)
Dept: FAMILY MEDICINE | Facility: CLINIC | Age: 25
End: 2020-04-20
Payer: COMMERCIAL

## 2020-04-20 DIAGNOSIS — Z30.011 ENCOUNTER FOR INITIAL PRESCRIPTION OF CONTRACEPTIVE PILLS: Primary | ICD-10-CM

## 2020-04-20 PROCEDURE — 99422 OL DIG E/M SVC 11-20 MIN: CPT | Performed by: PHYSICIAN ASSISTANT

## 2020-04-23 ENCOUNTER — E-VISIT (OUTPATIENT)
Dept: FAMILY MEDICINE | Facility: CLINIC | Age: 25
End: 2020-04-23
Payer: COMMERCIAL

## 2020-04-23 ENCOUNTER — NURSE TRIAGE (OUTPATIENT)
Dept: NURSING | Facility: CLINIC | Age: 25
End: 2020-04-23

## 2020-04-23 DIAGNOSIS — G43.809 OTHER MIGRAINE WITHOUT STATUS MIGRAINOSUS, NOT INTRACTABLE: Primary | ICD-10-CM

## 2020-04-23 PROCEDURE — 99421 OL DIG E/M SVC 5-10 MIN: CPT | Performed by: PHYSICIAN ASSISTANT

## 2020-04-23 RX ORDER — SUMATRIPTAN 50 MG/1
50 TABLET, FILM COATED ORAL
Qty: 9 TABLET | Refills: 0 | Status: SHIPPED | OUTPATIENT
Start: 2020-04-23 | End: 2020-06-18

## 2020-04-23 NOTE — TELEPHONE ENCOUNTER
Had imitrex refilled today and prescription was sent to [facility] in [place].   She wants it sent to another pharmacy instead: [facility], [place].    Tx to MADDISON GUTIERREZ for refill.

## 2020-04-23 NOTE — TELEPHONE ENCOUNTER
Called [facility] in [place] at [address] [place] [address] [address] and spoke to Keyanna.  She will contact the [facility] in [facility] in [place] and request the script for Imitrex be transferred to them.    Notified Ary that [facility] is getting the Rx for her and will fill.    Mona Baltazar RN  [facility]

## 2020-05-21 ENCOUNTER — E-VISIT (OUTPATIENT)
Dept: FAMILY MEDICINE | Facility: CLINIC | Age: 25
End: 2020-05-21
Payer: COMMERCIAL

## 2020-05-21 DIAGNOSIS — Z30.09 ENCOUNTER FOR OTHER GENERAL COUNSELING OR ADVICE ON CONTRACEPTION: Primary | ICD-10-CM

## 2020-05-21 PROCEDURE — 99422 OL DIG E/M SVC 11-20 MIN: CPT | Performed by: PHYSICIAN ASSISTANT

## 2020-06-18 ENCOUNTER — MYC REFILL (OUTPATIENT)
Dept: FAMILY MEDICINE | Facility: CLINIC | Age: 25
End: 2020-06-18

## 2020-06-18 DIAGNOSIS — G43.809 OTHER MIGRAINE WITHOUT STATUS MIGRAINOSUS, NOT INTRACTABLE: ICD-10-CM

## 2020-06-18 RX ORDER — SUMATRIPTAN 50 MG/1
50 TABLET, FILM COATED ORAL
Qty: 9 TABLET | Refills: 0 | Status: SHIPPED | OUTPATIENT
Start: 2020-06-18 | End: 2020-09-02

## 2020-06-18 NOTE — TELEPHONE ENCOUNTER
Prescription approved per [facility] Refill Protocol.    Anay Mcconnell RN, BSN  [facility] [place]

## 2020-06-24 ENCOUNTER — E-VISIT (OUTPATIENT)
Dept: FAMILY MEDICINE | Facility: CLINIC | Age: 25
End: 2020-06-24
Payer: COMMERCIAL

## 2020-06-24 DIAGNOSIS — L98.9 SKIN LESION: Primary | ICD-10-CM

## 2020-06-24 PROCEDURE — 99421 OL DIG E/M SVC 5-10 MIN: CPT | Performed by: PHYSICIAN ASSISTANT

## 2020-07-03 ENCOUNTER — TELEPHONE (OUTPATIENT)
Dept: LAB | Facility: CLINIC | Age: 25
End: 2020-07-03

## 2020-07-03 DIAGNOSIS — Z00.00 ENCOUNTER FOR ROUTINE ADULT HEALTH EXAMINATION WITHOUT ABNORMAL FINDINGS: Primary | ICD-10-CM

## 2020-07-06 DIAGNOSIS — Z00.00 ENCOUNTER FOR ROUTINE ADULT HEALTH EXAMINATION WITHOUT ABNORMAL FINDINGS: ICD-10-CM

## 2020-07-06 LAB
CHOLEST SERPL-MCNC: 191 MG/DL
ERYTHROCYTE [DISTWIDTH] IN BLOOD BY AUTOMATED COUNT: 12.7 % (ref 10–15)
GLUCOSE SERPL-MCNC: 87 MG/DL (ref 70–99)
HCT VFR BLD AUTO: 39.6 % (ref 35–47)
HDLC SERPL-MCNC: 75 MG/DL
HGB BLD-MCNC: 13.8 G/DL (ref 11.7–15.7)
LDLC SERPL CALC-MCNC: 73 MG/DL
MCH RBC QN AUTO: 28.5 PG (ref 26.5–33)
MCHC RBC AUTO-ENTMCNC: 34.8 G/DL (ref 31.5–36.5)
MCV RBC AUTO: 82 FL (ref 78–100)
NONHDLC SERPL-MCNC: 116 MG/DL
PLATELET # BLD AUTO: 241 10E9/L (ref 150–450)
RBC # BLD AUTO: 4.85 10E12/L (ref 3.8–5.2)
TRIGL SERPL-MCNC: 213 MG/DL
WBC # BLD AUTO: 5.4 10E9/L (ref 4–11)

## 2020-07-06 PROCEDURE — 36415 COLL VENOUS BLD VENIPUNCTURE: CPT | Performed by: PHYSICIAN ASSISTANT

## 2020-07-06 PROCEDURE — 82947 ASSAY GLUCOSE BLOOD QUANT: CPT | Performed by: PHYSICIAN ASSISTANT

## 2020-07-06 PROCEDURE — 85027 COMPLETE CBC AUTOMATED: CPT | Performed by: PHYSICIAN ASSISTANT

## 2020-07-06 PROCEDURE — 80061 LIPID PANEL: CPT | Performed by: PHYSICIAN ASSISTANT

## 2020-07-08 ENCOUNTER — OFFICE VISIT (OUTPATIENT)
Dept: FAMILY MEDICINE | Facility: CLINIC | Age: 25
End: 2020-07-08
Payer: COMMERCIAL

## 2020-07-08 VITALS
HEART RATE: 91 BPM | WEIGHT: 154 LBS | DIASTOLIC BLOOD PRESSURE: 70 MMHG | HEIGHT: 67 IN | BODY MASS INDEX: 24.17 KG/M2 | TEMPERATURE: 97.2 F | SYSTOLIC BLOOD PRESSURE: 110 MMHG | OXYGEN SATURATION: 96 %

## 2020-07-08 DIAGNOSIS — Z00.00 ROUTINE GENERAL MEDICAL EXAMINATION AT A HEALTH CARE FACILITY: Primary | ICD-10-CM

## 2020-07-08 PROCEDURE — 99395 PREV VISIT EST AGE 18-39: CPT | Performed by: FAMILY MEDICINE

## 2020-07-08 ASSESSMENT — ANXIETY QUESTIONNAIRES
2. NOT BEING ABLE TO STOP OR CONTROL WORRYING: MORE THAN HALF THE DAYS
5. BEING SO RESTLESS THAT IT IS HARD TO SIT STILL: NOT AT ALL
6. BECOMING EASILY ANNOYED OR IRRITABLE: SEVERAL DAYS
3. WORRYING TOO MUCH ABOUT DIFFERENT THINGS: MORE THAN HALF THE DAYS
GAD7 TOTAL SCORE: 10
7. FEELING AFRAID AS IF SOMETHING AWFUL MIGHT HAPPEN: SEVERAL DAYS
IF YOU CHECKED OFF ANY PROBLEMS ON THIS QUESTIONNAIRE, HOW DIFFICULT HAVE THESE PROBLEMS MADE IT FOR YOU TO DO YOUR WORK, TAKE CARE OF THINGS AT HOME, OR GET ALONG WITH OTHER PEOPLE: SOMEWHAT DIFFICULT
1. FEELING NERVOUS, ANXIOUS, OR ON EDGE: MORE THAN HALF THE DAYS

## 2020-07-08 ASSESSMENT — PATIENT HEALTH QUESTIONNAIRE - PHQ9
SUM OF ALL RESPONSES TO PHQ QUESTIONS 1-9: 2
5. POOR APPETITE OR OVEREATING: MORE THAN HALF THE DAYS

## 2020-07-08 ASSESSMENT — MIFFLIN-ST. JEOR: SCORE: 1466.17

## 2020-07-08 NOTE — PROGRESS NOTES
SUBJECTIVE:   CC: Ary Lutz is an 25 year old woman who presents for preventive health visit.     Healthy Habits:  Answers for HPI/ROS submitted by the patient on 7/7/2020   Annual Exam:  Frequency of exercise:: 4-5 days/week  Getting at least 3 servings of Calcium per day:: Yes  Diet:: Regular (no restrictions)  Taking medications regularly:: Yes  Medication side effects:: Lightheadedness  Bi-annual eye exam:: Yes  Dental care twice a year:: NO  Sleep apnea or symptoms of sleep apnea:: Daytime drowsiness  Additional concerns today:: No  Duration of exercise:: 15-30 minutes          Today's PHQ-2 Score:   PHQ-2 ( 1999 [employer]) 7/7/2020 7/4/2020   Q1: Little interest or pleasure in doing things 0 0   Q2: Feeling down, depressed or hopeless 0 0   PHQ-2 Score 0 0   Q1: Little interest or pleasure in doing things Not at all Not at all   Q2: Feeling down, depressed or hopeless Not at all Not at all   PHQ-2 Score 0 0       Abuse: Current or Past(Physical, Sexual or Emotional)- NO  Do you feel safe in your environment? YES        Social History     Tobacco Use     Smoking status: Never Smoker     Smokeless tobacco: Never Used   Substance Use Topics     Alcohol use: No     Alcohol/week: 0.0 standard drinks     If you drink alcohol do you typically have >3 drinks per day or >7 drinks per week? No                     Reviewed orders with patient.  Reviewed health maintenance and updated orders accordingly - Yes  BP Readings from Last 3 Encounters:   07/08/20 110/70   01/13/20 102/80   09/04/19 110/62    Wt Readings from Last 3 Encounters:   07/08/20 69.9 kg (154 lb)   01/13/20 67.6 kg (149 lb)   09/04/19 66.7 kg (147 lb)                  Patient Active Problem List   Diagnosis     SCOTT (generalized anxiety disorder)     Esophageal reflux     Panic attack     Past Surgical History:   Procedure Laterality Date     ESOPHAGOSCOPY, GASTROSCOPY, DUODENOSCOPY (EGD), COMBINED N/A 9/7/2017    Procedure: COMBINED  ESOPHAGOSCOPY, GASTROSCOPY, DUODENOSCOPY (EGD), BIOPSY SINGLE OR MULTIPLE;  gastroscopy;  Surgeon: Hollis Prather MD;  Location:  GI       Social History     Tobacco Use     Smoking status: Never Smoker     Smokeless tobacco: Never Used   Substance Use Topics     Alcohol use: No     Alcohol/week: 0.0 standard drinks     Family History   Problem Relation Age of Onset     Thyroid Cancer Mother      Familial Adenomatous Polyposis (FAP) Father      Hyperlipidemia Father      Colon Cancer Paternal Grandmother         at age 63      Diabetes No family hx of      Coronary Artery Disease No family hx of      Hypertension No family hx of      Cerebrovascular Disease No family hx of          Current Outpatient Medications   Medication Sig Dispense Refill     FLUoxetine (PROZAC) 40 MG capsule TAKE 1 CAPSULE BY MOUTH EVERY DAY 90 capsule 1     hydrOXYzine (ATARAX) 25 MG tablet Take 2 tablets (50 mg) by mouth every 8 hours as needed for itching 60 tablet 0     norgestrel-ethinyl estradiol (LO/OVRAL) 0.3-30 MG-MCG tablet Take 1 tablet by mouth daily 84 tablet 3     pantoprazole (PROTONIX) 40 MG EC tablet TAKE 1 TABLET BY MOUTH DAILY 90 tablet 3     SUMAtriptan (IMITREX) 50 MG tablet Take 1 tablet (50 mg) by mouth at onset of headache for migraine May repeat in 2 hours. Max 4 tablets/24 hours. 9 tablet 0     Allergies   Allergen Reactions     No Clinical Screening - See Comments      PN: LW CM1: CONTRAST- nka Reaction :     Bactrim [Sulfamethoxazole W/Trimethoprim] Rash       Mammogram not appropriate for this patient based on age.    Pertinent mammograms are reviewed under the imaging tab.  History of abnormal Pap smear: NO - age 21-29 PAP every 3 years recommended  PAP / HPV 9/25/2018   PAP NIL     Reviewed and updated as needed this visit by clinical staff  Tobacco  Allergies  Meds  Problems  Fam Hx         Reviewed and updated as needed this visit by Provider  Problems  Fam Hx            ROS:  CONSTITUTIONAL:  "NEGATIVE for fever, chills, change in weight  INTEGUMENTARU/SKIN: NEGATIVE for worrisome rashes, moles or lesions  EYES: NEGATIVE for vision changes or irritation  ENT: NEGATIVE for ear, mouth and throat problems  RESP: NEGATIVE for significant cough or SOB  BREAST: NEGATIVE for masses, tenderness or discharge  CV: NEGATIVE for chest pain, palpitations or peripheral edema  GI: NEGATIVE for nausea, abdominal pain, heartburn, or change in bowel habits  : NEGATIVE for unusual urinary or vaginal symptoms. Periods are regular.  MUSCULOSKELETAL: NEGATIVE for significant arthralgias or myalgia  NEURO: NEGATIVE for weakness, dizziness or paresthesias  PSYCHIATRIC: NEGATIVE for changes in mood or affect    OBJECTIVE:   /70 (BP Location: Left arm, Patient Position: Chair, Cuff Size: Adult Regular)   Pulse 91   Temp 97.2  F (36.2  C) (Tympanic)   Ht 1.702 m (5' 7\")   Wt 69.9 kg (154 lb)   LMP 06/13/2020   SpO2 96%   BMI 24.12 kg/m    EXAM:  GENERAL: healthy, alert and no distress  EYES: Eyes grossly normal to inspection, PERRL and conjunctivae and sclerae normal  HENT: ear canals and TM's normal, nose and mouth without ulcers or lesions  NECK: no adenopathy, no asymmetry, masses, or scars and thyroid normal to palpation  RESP: lungs clear to auscultation - no rales, rhonchi or wheezes  BREAST: normal without masses, tenderness or nipple discharge and no palpable axillary masses or adenopathy  CV: regular rate and rhythm, normal S1 S2, no S3 or S4, no murmur, click or rub, no peripheral edema and peripheral pulses strong  ABDOMEN: soft, nontender, no hepatosplenomegaly, no masses and bowel sounds normal  MS: no gross musculoskeletal defects noted, no edema  SKIN: no suspicious lesions or rashes  NEURO: Normal strength and tone, mentation intact and speech normal  PSYCH: mentation appears normal, affect normal/bright        ASSESSMENT/PLAN:   1. Routine general medical examination at a [facility] " "facility  Patient is up-to-date with preventative care.      COUNSELING:   Reviewed preventive health counseling, as reflected in patient instructions       Regular exercise       Healthy diet/nutrition    Estimated body mass index is 24.12 kg/m  as calculated from the following:    Height as of this encounter: 1.702 m (5' 7\").    Weight as of this encounter: 69.9 kg (154 lb).         reports that she has never smoked. She has never used smokeless tobacco.      Counseling Resources:  ATP IV Guidelines  Pooled Cohorts Equation Calculator  Breast Cancer Risk Calculator  FRAX Risk Assessment  ICSI Preventive Guidelines  Dietary Guidelines for Americans, 2010  USDA's MyPlate  ASA Prophylaxis  Lung CA Screening    Tamir Loyd MD  [facility] [place]  "

## 2020-07-09 ASSESSMENT — ANXIETY QUESTIONNAIRES: GAD7 TOTAL SCORE: 10

## 2020-08-03 ENCOUNTER — MYC MEDICAL ADVICE (OUTPATIENT)
Dept: FAMILY MEDICINE | Facility: CLINIC | Age: 25
End: 2020-08-03

## 2020-08-03 ENCOUNTER — OFFICE VISIT (OUTPATIENT)
Dept: FAMILY MEDICINE | Facility: CLINIC | Age: 25
End: 2020-08-03
Payer: COMMERCIAL

## 2020-08-03 VITALS
BODY MASS INDEX: 23.65 KG/M2 | OXYGEN SATURATION: 98 % | DIASTOLIC BLOOD PRESSURE: 70 MMHG | WEIGHT: 151 LBS | HEART RATE: 88 BPM | SYSTOLIC BLOOD PRESSURE: 102 MMHG | TEMPERATURE: 99.2 F

## 2020-08-03 DIAGNOSIS — J01.90 ACUTE SINUSITIS WITH SYMPTOMS > 10 DAYS: Primary | ICD-10-CM

## 2020-08-03 DIAGNOSIS — R42 LIGHTHEADEDNESS: Primary | ICD-10-CM

## 2020-08-03 DIAGNOSIS — H61.23 BILATERAL IMPACTED CERUMEN: ICD-10-CM

## 2020-08-03 DIAGNOSIS — R51.9 SINUS HEADACHE: ICD-10-CM

## 2020-08-03 PROCEDURE — 99214 OFFICE O/P EST MOD 30 MIN: CPT | Performed by: PHYSICIAN ASSISTANT

## 2020-08-03 NOTE — PROGRESS NOTES
Subjective     Ary Lutz is a 25 year old female who presents to clinic today for the following health issues:    HPI       Acute Illness   Acute illness concerns: sinus pain   Onset: 2 weeks     Fever: no     Chills/Sweats: no     Headache (location?): YES    Sinus Pressure:YES- tender and post-nasal drainage    Conjunctivitis:  no    Ear Pain: YES: bilateral    Rhinorrhea: no     Congestion: YES    Sore Throat: no      Cough: no    Wheeze: no     Decreased Appetite: no     Nausea: yes     Vomiting: no     Diarrhea:  no     Dysuria/Freq.: no     Fatigue/Achiness: YES fatigue     Sick/Strep Exposure: no      Therapies Tried and outcome: otc sinus medications , Kevin Kunz has been experiencing some mild pressure in her face and frontal region over the past 2 weeks and has been having some mild lightheadedness that is intermittent and worse with standing, sitting and laying down.  No recent changes in medications. She has some associated ear fullness bilaterally, no nasal congestion, fevers or cough.  No CP, SOB, weakness, tingling or numbness        Patient Active Problem List   Diagnosis     SCOTT (generalized anxiety disorder)     Esophageal reflux     Panic attack     Past Surgical History:   Procedure Laterality Date     ESOPHAGOSCOPY, GASTROSCOPY, DUODENOSCOPY (EGD), COMBINED N/A 9/7/2017    Procedure: COMBINED ESOPHAGOSCOPY, GASTROSCOPY, DUODENOSCOPY (EGD), BIOPSY SINGLE OR MULTIPLE;  gastroscopy;  Surgeon: Hollis Prather MD;  Location:  GI       Social History     Tobacco Use     Smoking status: Never Smoker     Smokeless tobacco: Never Used   Substance Use Topics     Alcohol use: No     Alcohol/week: 0.0 standard drinks     Family History   Problem Relation Age of Onset     Thyroid Cancer Mother      Familial Adenomatous Polyposis (FAP) Father      Hyperlipidemia Father      Colon Cancer Paternal Grandmother         at age 63      Diabetes No family hx of      Coronary Artery Disease  No family hx of      Hypertension No family hx of      Cerebrovascular Disease No family hx of          Current Outpatient Medications   Medication Sig Dispense Refill     drospirenone-ethinyl estradiol (BRANDON) 3-0.03 MG tablet Take 1 tablet by mouth daily 84 tablet 3     FLUoxetine (PROZAC) 40 MG capsule TAKE 1 CAPSULE BY MOUTH EVERY DAY 90 capsule 1     hydrOXYzine (ATARAX) 25 MG tablet Take 2 tablets (50 mg) by mouth every 8 hours as needed for itching 60 tablet 0     pantoprazole (PROTONIX) 40 MG EC tablet TAKE 1 TABLET BY MOUTH DAILY 90 tablet 3     SUMAtriptan (IMITREX) 50 MG tablet Take 1 tablet (50 mg) by mouth at onset of headache for migraine May repeat in 2 hours. Max 4 tablets/24 hours. 9 tablet 0     Allergies   Allergen Reactions     No Clinical Screening - See Comments      PN: LW CM1: CONTRAST- nka Reaction :     Bactrim [Sulfamethoxazole W/Trimethoprim] Rash       Reviewed and updated as needed this visit by Provider         Review of Systems   Constitutional, HEENT, cardiovascular, pulmonary, GI, , musculoskeletal, neuro, skin, endocrine and psych systems are negative, except as otherwise noted.      Objective    /70   Pulse 88   Temp 99.2  F (37.3  C) (Tympanic)   Wt 68.5 kg (151 lb)   LMP 07/04/2020   SpO2 98%   BMI 23.65 kg/m    Body mass index is 23.65 kg/m .  Physical Exam   GENERAL: healthy, alert and no distress  EYES: Eyes grossly normal to inspection, PERRL and conjunctivae and sclerae normal  HENT:  Cerumen impaction bilaterally, ear canals and TM's normal, mild TTP of frontal and maxillary sinuses  RESP: lungs clear to auscultation - no rales, rhonchi or wheezes  CV: regular rate and rhythm, normal S1 S2, no S3 or S4, no murmur, click or rub  MS: no gross musculoskeletal defects noted, no edema  NEURO: Normal strength and tone, mentation intact and speech normal, EOMI, CNII-XII intact  PSYCH: mentation appears normal, affect normal/bright    Diagnostic Test Results:  none          Assessment & Plan       Joaquina has been experiencing a mild frontal headache and lightheadedness over the past few weeks.  On exam she does have bilateral cerumen impaction which was irrigated successfully in the office.  I have advised that she try sudafed and nasal saline for her sinus symptoms over the next 1 week.  If no improvement in her sinus symptoms, will try and antibiotic. Lightheadedness seems mild and intermittent and my be due to inner ear fullness in the setting of her sinus symptoms. Neuro intact today    1. Lightheadedness    2. Sinus headache    3. Bilateral impacted cerumen         Follow up in 1-2 weeks if no improvement in symptoms    No follow-ups on file.    Yanick Baca PA-C  [facility] [place]

## 2020-08-04 NOTE — TELEPHONE ENCOUNTER
Her CT scan was done in 2019 and was normal.  I do think that her lightheadedness is secondary to some inner ear fullness with her current sinus issues.  Her neuro exam was normal.  Please have her monitor her symptoms and let me know if they are worsening

## 2020-08-04 NOTE — TELEPHONE ENCOUNTER
Sent GetMaid message with provider response.    Anay Mcconnell RN, BSN  [facility] [place]

## 2020-08-13 NOTE — TELEPHONE ENCOUNTER
Please see [email or web address] message and advise.      Thank you,  Vernell REBOLLEDORN BSN  [facility]  [phone]

## 2020-08-20 ENCOUNTER — MYC MEDICAL ADVICE (OUTPATIENT)
Dept: FAMILY MEDICINE | Facility: CLINIC | Age: 25
End: 2020-08-20

## 2020-08-21 NOTE — TELEPHONE ENCOUNTER
Patient given message from Yanick Baca PA-C. She agrees to call back with update when antibiotic treatment is complete. Shae Johnston RN

## 2020-08-21 NOTE — TELEPHONE ENCOUNTER
I would recommend she begin a medication for daily headaches such as amitriptyline.  This can help to prevent headaches  If her imitrex is not working

## 2020-08-21 NOTE — TELEPHONE ENCOUNTER
I would recommend that she finish the antibiotics.  We could try a course of steroids if her sinus pressure persists and if needed, she can see ENT. I am unclear of the cause of her lightheadedness.  She has had normal brain imaging, labs and an echo and so it is unlikely this this is caused by something sinister but may be more related to fluids intake/diet and even stress.  Please ask that she contact me when her antibiotic course is finished and we can see how she is doing.

## 2020-09-01 DIAGNOSIS — K21.9 GASTROESOPHAGEAL REFLUX DISEASE WITHOUT ESOPHAGITIS: ICD-10-CM

## 2020-09-01 DIAGNOSIS — F41.1 GAD (GENERALIZED ANXIETY DISORDER): ICD-10-CM

## 2020-09-01 RX ORDER — PANTOPRAZOLE SODIUM 40 MG/1
TABLET, DELAYED RELEASE ORAL
Qty: 90 TABLET | Refills: 0 | Status: SHIPPED | OUTPATIENT
Start: 2020-09-01 | End: 2020-11-29

## 2020-09-01 RX ORDER — FLUOXETINE 40 MG/1
CAPSULE ORAL
Qty: 90 CAPSULE | Refills: 1 | Status: SHIPPED | OUTPATIENT
Start: 2020-09-01 | End: 2021-02-23

## 2020-09-01 NOTE — TELEPHONE ENCOUNTER
Prescription approved per [facility] [place]ill [place].    Sarah VILLATORO RN  EP Triage

## 2020-09-01 NOTE — TELEPHONE ENCOUNTER
Prescription approved per [facility] Refill Protocol.    Sarah VILLATORO RN  EP Triage

## 2020-09-02 ENCOUNTER — MYC REFILL (OUTPATIENT)
Dept: FAMILY MEDICINE | Facility: CLINIC | Age: 25
End: 2020-09-02

## 2020-09-02 DIAGNOSIS — G43.809 OTHER MIGRAINE WITHOUT STATUS MIGRAINOSUS, NOT INTRACTABLE: ICD-10-CM

## 2020-09-03 RX ORDER — SUMATRIPTAN 50 MG/1
50 TABLET, FILM COATED ORAL
Qty: 9 TABLET | Refills: 0 | Status: SHIPPED | OUTPATIENT
Start: 2020-09-03 | End: 2020-10-01

## 2020-09-03 NOTE — TELEPHONE ENCOUNTER
Routing refill request to provider for review/approval because:  Protocol failed, needs provider review    PATRIA Rushing, RN  [employer] [facility]

## 2020-10-01 DIAGNOSIS — G43.809 OTHER MIGRAINE WITHOUT STATUS MIGRAINOSUS, NOT INTRACTABLE: ICD-10-CM

## 2020-10-01 RX ORDER — SUMATRIPTAN 50 MG/1
TABLET, FILM COATED ORAL
Qty: 9 TABLET | Refills: 3 | Status: SHIPPED | OUTPATIENT
Start: 2020-10-01 | End: 2021-01-13 | Stop reason: ALTCHOICE

## 2020-10-01 NOTE — TELEPHONE ENCOUNTER
Prescription approved per [facility] [place].    Sarah VILLATORO RN  EP Triage

## 2020-10-23 ENCOUNTER — MYC REFILL (OUTPATIENT)
Dept: FAMILY MEDICINE | Facility: CLINIC | Age: 25
End: 2020-10-23

## 2020-10-23 DIAGNOSIS — F41.0 PANIC ATTACK: ICD-10-CM

## 2020-10-24 RX ORDER — HYDROXYZINE HYDROCHLORIDE 25 MG/1
50 TABLET, FILM COATED ORAL EVERY 8 HOURS PRN
Qty: 60 TABLET | Refills: 3 | Status: SHIPPED | OUTPATIENT
Start: 2020-10-24 | End: 2021-11-03

## 2020-10-29 NOTE — PROGRESS NOTES
"  SUBJECTIVE:                                                   Ary Lutz is a 25 year old female who presents to clinic today for the following health issue(s):  Patient presents with:  Abnormal Bleeding Problem      Additional information:spotting last week and this week  HPI:  New patient to me here today with c/o years of on again off again spotting on OCP's. Good compliance. Takes it every morning within a 15 minute window. Not on any recent abx.   She's been on the same pill for about 10 years or so. Started having PC spotting a few years ago, now has spotting randomly with cramping. Light pink/red in color.   +s.a. no dyspareunia.   Hx of abnormal pap/HPV. Had an \"abnormal\" pap in 2020 by a clinic by her work, we don't have records. Is very anxious about this, requesting repeat pap today.   Hx of nabothian cysts and possible ectropion.    Patient's last menstrual period was 10/05/2020 (exact date)..     Patient is sexually active, .  Using oral contraceptives for contraception.    reports that she has never smoked. She has never used smokeless tobacco.    STD testing offered?  Declined    Health maintenance updated:  yes    Today's PHQ-2 Score:   PHQ-2 (  [employer]) 10/30/2020   Q1: Little interest or pleasure in doing things 0   Q2: Feeling down, depressed or hopeless 0   PHQ-2 Score 0   Q1: Little interest or pleasure in doing things -   Q2: Feeling down, depressed or hopeless -   PHQ-2 Score -     Today's PHQ-9 Score:   PHQ-9 SCORE 10/30/2020   PHQ-9 Total Score MyChart -   PHQ-9 Total Score 1     Today's SCOTT-7 Score:   SCOTT-7 SCORE 10/30/2020   Total Score 4       Problem list and histories reviewed & adjusted, as indicated.  Additional history: as documented.    Patient Active Problem List   Diagnosis     SCOTT (generalized anxiety disorder)     Esophageal reflux     Panic attack     Past Surgical History:   Procedure Laterality Date     ESOPHAGOSCOPY, GASTROSCOPY, DUODENOSCOPY (EGD), " "COMBINED N/A 9/7/2017    Procedure: COMBINED ESOPHAGOSCOPY, GASTROSCOPY, DUODENOSCOPY (EGD), BIOPSY SINGLE OR MULTIPLE;  gastroscopy;  Surgeon: Hollis Prather MD;  Location:  GI      Social History     Tobacco Use     Smoking status: Never Smoker     Smokeless tobacco: Never Used   Substance Use Topics     Alcohol use: No     Alcohol/week: 0.0 standard drinks      Problem (# of Occurrences) Relation (Name,Age of Onset)    Colon Cancer (1) Paternal Grandmother: at age 63     Familial Adenomatous Polyposis (FAP) (1) Father    Hyperlipidemia (1) Father    No Known Problems (5) Sister, Brother, Maternal Grandmother, Maternal Grandfather, Other    Thyroid Cancer (1) Mother       Negative family history of: Diabetes, Coronary Artery Disease, Hypertension, Cerebrovascular Disease            Current Outpatient Medications   Medication Sig     FLUoxetine (PROZAC) 40 MG capsule TAKE 1 CAPSULE BY MOUTH EVERY DAY     hydrOXYzine (ATARAX) 25 MG tablet Take 2 tablets (50 mg) by mouth every 8 hours as needed for itching     norethindrone-ethinyl estradiol-iron (MICROGESTIN FE1.5/30) 1.5-30 MG-MCG tablet Take 1 tablet by mouth daily     pantoprazole (PROTONIX) 40 MG EC tablet TAKE 1 TABLET BY MOUTH DAILY     SUMAtriptan (IMITREX) 50 MG tablet TAKE 1 TABLET BY MOUTH AT ONSET OF HEADACHE FOR MIGRAINE MAY REPEAT IN 2 HOURS. MAX 4 TABLETS/24 HRS     No current facility-administered medications for this visit.      Allergies   Allergen Reactions     No Clinical Screening - See Comments      PN: LW CM1: CONTRAST- nka Reaction :     Bactrim [Sulfamethoxazole W/Trimethoprim] Rash       ROS:  12 point review of systems negative other than symptoms noted below or in the HPI.  Genitourinary: Irregular Menses  No urinary frequency or dysuria, bladder or kidney problems, POSITIVE for:, irregular menses      OBJECTIVE:     BP 98/60   Pulse 60   Ht 1.72 m (5' 7.72\")   Wt 69.4 kg (153 lb)   LMP 10/05/2020 (Exact Date)   BMI 23.46 " kg/m    Body mass index is 23.46 kg/m .    Exam:  Constitutional:  Appearance: Well nourished, well developed alert, in no acute distress  Psychiatric:  Mentation appears normal and affect normal/bright.  Pelvic Exam:  External Genitalia:     Normal appearance for age, no discharge present, no tenderness present, no inflammatory lesions present, color normal  Vagina:     Normal vaginal vault without central or paravaginal defects, YELLOW/CREAMY discharge present, no inflammatory lesions present, no masses present  Bladder:     Nontender to palpation  Urethra:   Urethral Body:  Urethra palpation normal, urethra structural support normal   Urethral Meatus:  No erythema or lesions present  Cervix:     Appearance healthy, no lesions present, nontender to palpation, no bleeding present-FRIABLE,   Uterus:     Uterus: firm, normal sized and nontender, retroverted in position.   Adnexa:     No adnexal tenderness present, no adnexal masses present  Perineum:     Perineum within normal limits, no evidence of trauma, no rashes or skin lesions present  Anus:     Anus within normal limits, no hemorrhoids present  Inguinal Lymph Nodes:     No lymphadenopathy present  Pubic Hair:     Normal pubic hair distribution for age  Genitalia and Groin:     No rashes present, no lesions present, no areas of discoloration, no masses present       In-Clinic Test Results:  Results for orders placed or performed in visit on 10/30/20 (from the past 24 hour(s))   Wet prep    Specimen: Vagina   Result Value Ref Range    Specimen Description Vagina     Wet Prep No Trichomonas seen     Wet Prep No clue cells seen     Wet Prep No yeast seen     Wet Prep Many  WBC'S seen          ASSESSMENT/PLAN:                                                        ICD-10-CM    1. Breakthrough bleeding on birth control pills  N92.1 Wet prep   2. Screening for cervical cancer  Z12.4 Pap imaged thin layer screen reflex to HPV if ASCUS - recommend age 25 - 29   3.  Encounter for surveillance of contraceptive pills  Z30.41 norethindrone-ethinyl estradiol-iron (MICROGESTIN FE1.5/30) 1.5-30 MG-MCG tablet       There are no Patient Instructions on file for this visit.    Wet prep: negative  Will update on pap. HPV, ectropion, nabothian cysts discussed. BTB on pills. Will change to microgestin 1.5/30 for a few months and see how she does with BTB. Still may need cryo to cervix if pap NIL and BTB persists.     Mercedez Schmid, ROBE CNP  [facility] [place]

## 2020-10-30 ENCOUNTER — OFFICE VISIT (OUTPATIENT)
Dept: OBGYN | Facility: CLINIC | Age: 25
End: 2020-10-30
Payer: COMMERCIAL

## 2020-10-30 ENCOUNTER — MYC MEDICAL ADVICE (OUTPATIENT)
Dept: OBGYN | Facility: CLINIC | Age: 25
End: 2020-10-30

## 2020-10-30 VITALS
HEART RATE: 60 BPM | HEIGHT: 68 IN | SYSTOLIC BLOOD PRESSURE: 98 MMHG | BODY MASS INDEX: 23.19 KG/M2 | WEIGHT: 153 LBS | DIASTOLIC BLOOD PRESSURE: 60 MMHG

## 2020-10-30 DIAGNOSIS — Z12.4 SCREENING FOR CERVICAL CANCER: ICD-10-CM

## 2020-10-30 DIAGNOSIS — N92.1 BREAKTHROUGH BLEEDING ON BIRTH CONTROL PILLS: Primary | ICD-10-CM

## 2020-10-30 DIAGNOSIS — Z30.41 ENCOUNTER FOR SURVEILLANCE OF CONTRACEPTIVE PILLS: ICD-10-CM

## 2020-10-30 LAB
SPECIMEN SOURCE: NORMAL
WET PREP SPEC: NORMAL

## 2020-10-30 PROCEDURE — 87210 SMEAR WET MOUNT SALINE/INK: CPT | Performed by: NURSE PRACTITIONER

## 2020-10-30 PROCEDURE — 99213 OFFICE O/P EST LOW 20 MIN: CPT | Performed by: NURSE PRACTITIONER

## 2020-10-30 PROCEDURE — G0145 SCR C/V CYTO,THINLAYER,RESCR: HCPCS | Performed by: NURSE PRACTITIONER

## 2020-10-30 RX ORDER — NORETHINDRONE ACETATE AND ETHINYL ESTRADIOL 1.5-30(21)
1 KIT ORAL DAILY
Qty: 84 TABLET | Refills: 1 | Status: SHIPPED | OUTPATIENT
Start: 2020-10-30 | End: 2021-01-13

## 2020-10-30 ASSESSMENT — ANXIETY QUESTIONNAIRES
3. WORRYING TOO MUCH ABOUT DIFFERENT THINGS: SEVERAL DAYS
7. FEELING AFRAID AS IF SOMETHING AWFUL MIGHT HAPPEN: NOT AT ALL
1. FEELING NERVOUS, ANXIOUS, OR ON EDGE: SEVERAL DAYS
5. BEING SO RESTLESS THAT IT IS HARD TO SIT STILL: NOT AT ALL
GAD7 TOTAL SCORE: 4
2. NOT BEING ABLE TO STOP OR CONTROL WORRYING: SEVERAL DAYS
IF YOU CHECKED OFF ANY PROBLEMS ON THIS QUESTIONNAIRE, HOW DIFFICULT HAVE THESE PROBLEMS MADE IT FOR YOU TO DO YOUR WORK, TAKE CARE OF THINGS AT HOME, OR GET ALONG WITH OTHER PEOPLE: SOMEWHAT DIFFICULT
6. BECOMING EASILY ANNOYED OR IRRITABLE: NOT AT ALL

## 2020-10-30 ASSESSMENT — PATIENT HEALTH QUESTIONNAIRE - PHQ9
5. POOR APPETITE OR OVEREATING: SEVERAL DAYS
SUM OF ALL RESPONSES TO PHQ QUESTIONS 1-9: 1

## 2020-10-30 ASSESSMENT — MIFFLIN-ST. JEOR: SCORE: 1473.01

## 2020-10-30 NOTE — TELEPHONE ENCOUNTER
Routing pt [employer] message to provider to advise.  Karen Mcgill RN on 10/30/2020 at 12:00 PM

## 2020-10-31 ASSESSMENT — ANXIETY QUESTIONNAIRES: GAD7 TOTAL SCORE: 4

## 2020-11-03 LAB
COPATH REPORT: NORMAL
PAP: NORMAL

## 2020-11-29 DIAGNOSIS — K21.9 GASTROESOPHAGEAL REFLUX DISEASE WITHOUT ESOPHAGITIS: ICD-10-CM

## 2020-11-29 RX ORDER — PANTOPRAZOLE SODIUM 40 MG/1
TABLET, DELAYED RELEASE ORAL
Qty: 90 TABLET | Refills: 0 | Status: SHIPPED | OUTPATIENT
Start: 2020-11-29 | End: 2021-07-16

## 2020-11-29 NOTE — TELEPHONE ENCOUNTER
Prescription approved per [facility] Refill Protocol.    Sarah VILLATORO RN  EP Triage

## 2020-12-18 ENCOUNTER — OFFICE VISIT (OUTPATIENT)
Dept: FAMILY MEDICINE | Facility: CLINIC | Age: 25
End: 2020-12-18
Payer: COMMERCIAL

## 2020-12-18 VITALS
HEART RATE: 109 BPM | SYSTOLIC BLOOD PRESSURE: 122 MMHG | OXYGEN SATURATION: 95 % | TEMPERATURE: 97.3 F | DIASTOLIC BLOOD PRESSURE: 86 MMHG | BODY MASS INDEX: 23.89 KG/M2 | WEIGHT: 155.8 LBS

## 2020-12-18 DIAGNOSIS — F41.1 GAD (GENERALIZED ANXIETY DISORDER): ICD-10-CM

## 2020-12-18 DIAGNOSIS — G43.809 OTHER MIGRAINE WITHOUT STATUS MIGRAINOSUS, NOT INTRACTABLE: ICD-10-CM

## 2020-12-18 DIAGNOSIS — K58.0 IRRITABLE BOWEL SYNDROME WITH DIARRHEA: Primary | ICD-10-CM

## 2020-12-18 PROCEDURE — 99214 OFFICE O/P EST MOD 30 MIN: CPT | Performed by: PHYSICIAN ASSISTANT

## 2020-12-18 RX ORDER — DICYCLOMINE HYDROCHLORIDE 10 MG/1
10 CAPSULE ORAL 4 TIMES DAILY PRN
Qty: 90 CAPSULE | Refills: 1 | Status: SHIPPED | OUTPATIENT
Start: 2020-12-18 | End: 2021-02-05

## 2020-12-18 RX ORDER — NORTRIPTYLINE HCL 10 MG
10 CAPSULE ORAL AT BEDTIME
Qty: 90 CAPSULE | Refills: 1 | Status: SHIPPED | OUTPATIENT
Start: 2020-12-18 | End: 2021-01-13

## 2020-12-18 NOTE — PROGRESS NOTES
Subjective     Ary Lutz is a 25 year old female who presents to clinic today for the following health issues:    HPI         Hemorrhoids  Onset/Duration: 3-4 weeks   Description:   Taryn-anal lump: YES- internal   Pain: YES- sharp and burnning  Itching: no  Accompanying Signs & Symptoms:worsening   Blood in stool: no  Changes in stool pattern: no  History:   Any previous GI studies done: colonoscopy and endoscopy  Family History of colon cancer: no  Precipitating factors:   Worse as the day goes on   Alleviating factors:  Better in the morning   Therapies tried and outcome:  no relief  Patient seen in UC yesterday and presumptive diagnosis for hemorrhoid and started treatment for possible rectal abscess though as well; Patient has follow up at [facility] for colorectal pending this afternoon as well.    From UC visit:  Presumed hemorrhoid so using OTC suppositories without relief. On exam, she has no evidence of external hemorrhoids or anal fissures. Using anoscope there is a small area of swelling right at the entrance of the rectum that to me appears consistent with a hemorrhoid. This is not actively bleeding or draining. Given her pain and location, I did place a referral to colorectal and actually was able to speak with the colorectal provider Dr Meraz who felt it would be unusual for internal hemorrhoids to cause pain. Given this, to be on the cautious side, he recommended starting antibiotics to cover for potential abscess and will plan to see patient in clinic tomorrow afternoon. He will discontinue antibiotics at that point if does not feel this is an abscess. Cipro and Flagyl started. Discussed potential side effect of tendinopathy/tendon rupture associated with fluoroquinolones and to follow up immediately should this occur. Also advised to avoid alcohol with Flagyl. We discussed sitz baths for comfort, tylenol or ibuprofen as needed and she can use topical lidocaine prn sparingly. If  worse in any way before her apt, she can always return. She was in agreement with plan of care and discharged in satisfactory condition with all questions answered.     Patient wondering about better control of previous IBS symptoms of regular diarrhea.      Review of Systems   Constitutional, HEENT, cardiovascular, pulmonary, GI, , musculoskeletal, neuro, skin, endocrine and psych systems are negative, except as otherwise noted.      Objective    /86   Pulse 109   Temp 97.3  F (36.3  C) (Tympanic)   Wt 70.7 kg (155 lb 12.8 oz)   LMP 11/30/2020   SpO2 95%   BMI 23.89 kg/m    Body mass index is 23.89 kg/m .  Physical Exam   GENERAL: healthy, alert and no distress  RESP: lungs clear to auscultation - no rales, rhonchi or wheezes  CV: regular rate and rhythm, normal S1 S2, no S3 or S4, no murmur, click or rub, no peripheral edema and peripheral pulses strong  RECTAL (female): deferred today as done in UC and has colorectal follow up this afternoon  PSYCH: mentation appears normal, affect normal/bright    No results found for this or any previous visit (from the past 24 hour(s)).        Assessment & Plan     Irritable bowel syndrome with diarrhea  Long standing, chronic, UNcontrolled-  Follow up with Gastroenterology as scheduled, but option for bentyl (antispasmodic) discussed with patient at length - caution with possible constipation with increased use discussed with as well. Trial of TCA daily medicine on top of current selective serotonin reuptake inhibitor to be added as well - may help with IBS, SCOTT and migraine prophylaxis - side effects and caution discussed with patient at length as well.  - dicyclomine (BENTYL) 10 MG capsule; Take 1 capsule (10 mg) by mouth 4 times daily as needed  - nortriptyline (PAMELOR) 10 MG capsule; Take 1 capsule (10 mg) by mouth At Bedtime    Other migraine without status migrainosus, not intractable  Trial of prophylactic TCA sent to pharmacy with potential bonus of  improvement in IBS symptoms and SCOTT symptoms as above. Start at 10 mg for a few months, but may need to increase to 20/25mg nightly if tolerated/needed over the next few months.  - nortriptyline (PAMELOR) 10 MG capsule; Take 1 capsule (10 mg) by mouth At Bedtime    SCOTT (generalized anxiety disorder)  Trial of additional TCA sent to pharmacy with potential bonus of improvement in IBS symptoms and migraine prophylaxis as above.  - nortriptyline (PAMELOR) 10 MG capsule; Take 1 capsule (10 mg) by mouth At Bedtime        Patient Instructions         Did you know?      You can schedule a video visit for follow-up appointments as well as future appointments for certain conditions.  Please see the below link.     [email or web address]    If you have not already done so,  I encourage you to sign up for [employer] ([email or web address]).  This will allow you to review your results, securely communicate with a provider, and schedule virtual visits as well.      Return in about 3 months (around 3/18/2021) for Routine Visit, or sooner with worsening symptoms.    APOLINAR Pham [facility]

## 2020-12-18 NOTE — PATIENT INSTRUCTIONS
Did you know?      You can schedule a video visit for follow-up appointments as well as future appointments for certain conditions.  Please see the below link.     [email or web address]    If you have not already done so,  I encourage you to sign up for [employer] ([email or web address]).  This will allow you to review your results, securely communicate with a provider, and schedule virtual visits as well.

## 2021-01-13 ENCOUNTER — MYC MEDICAL ADVICE (OUTPATIENT)
Dept: FAMILY MEDICINE | Facility: CLINIC | Age: 26
End: 2021-01-13

## 2021-01-13 ENCOUNTER — VIRTUAL VISIT (OUTPATIENT)
Dept: FAMILY MEDICINE | Facility: CLINIC | Age: 26
End: 2021-01-13
Payer: COMMERCIAL

## 2021-01-13 DIAGNOSIS — Z30.41 ENCOUNTER FOR SURVEILLANCE OF CONTRACEPTIVE PILLS: ICD-10-CM

## 2021-01-13 DIAGNOSIS — G43.809 OTHER MIGRAINE WITHOUT STATUS MIGRAINOSUS, NOT INTRACTABLE: Primary | ICD-10-CM

## 2021-01-13 PROCEDURE — 99214 OFFICE O/P EST MOD 30 MIN: CPT | Mod: 95 | Performed by: INTERNAL MEDICINE

## 2021-01-13 RX ORDER — NORETHINDRONE ACETATE AND ETHINYL ESTRADIOL 1.5-30(21)
1 KIT ORAL DAILY
Qty: 112 TABLET | Refills: 3 | Status: SHIPPED | OUTPATIENT
Start: 2021-01-13 | End: 2021-01-26

## 2021-01-13 RX ORDER — ELETRIPTAN HYDROBROMIDE 20 MG/1
20-40 TABLET, FILM COATED ORAL
Qty: 18 TABLET | Refills: 3 | Status: SHIPPED | OUTPATIENT
Start: 2021-01-13 | End: 2021-01-14

## 2021-01-13 RX ORDER — NORTRIPTYLINE HCL 10 MG
10 CAPSULE ORAL AT BEDTIME
Qty: 90 CAPSULE | Refills: 3 | Status: SHIPPED | OUTPATIENT
Start: 2021-01-13 | End: 2021-01-15

## 2021-01-13 NOTE — PROGRESS NOTES
Joaquina is a 25 year old who is being evaluated via a billable video visit.      How would you like to obtain your AVS? MyChart  If the video visit is dropped, the invitation should be resent by: Text to cell phone: [phone]  Will anyone else be joining your video visit? No    Video Start Time: 4:42 PM  Assessment & Plan     Other migraine without status migrainosus, not intractable  Using triptan 8-10 days per month, migraines not well controlled. She is only on a small dose of TCA, recommended increasing to 30mg if tolerated.  Can also take OCP continuously. She would like to try both of these options.  Will also see if another triptan is more effective.   - nortriptyline (PAMELOR) 10 MG capsule; Take 1 capsule (10 mg) by mouth At Bedtime Increase every 3-4 days to goal of 30mg at bedtime  - rizatriptan 5-10mg     Encounter for surveillance of contraceptive pills  - norethindrone-ethinyl estradiol-iron (MICROGESTIN FE1.5/30) 1.5-30 MG-MCG tablet; Take 1 tablet by mouth daily Skip placebo pills    Review of external notes as documented above   - FP visit on 12/18/20          Return in about 6 weeks (around 2/24/2021) for migraine follow up- this can be virtual .    Elen Baldwin MD  [facility]    [place]     Joaquina is a 25 year old who presents to clinic today for the following health issues     HPI       Migraine     Since your last clinic visit, how have your headaches changed?  No change    How often are you getting headaches or migraines? 8-10 times per month     Are you able to do normal daily activities when you have a migraine? Sometimes    Are you taking rescue/relief medications? (Select all that apply) sumatriptan (Imitrex)    How helpful is your rescue/relief medication?  I get some relief    Are you taking any medications to prevent migraines? (Select all that apply)  No    In the past 4 weeks, how often have you gone to urgent care or the emergency room because of your  headaches?  0    Joaquina and I spoke today about her migraines.  She is getting them several times per month.  She usually tries over-the-counter medications first, then will take Imitrex if that does not work.  She finds that the Imitrex does take with a headache, but the next morning she wakes up and the migraine is back.  Sometimes like to take Imitrex 3 or 4 mornings in a row.  She was started on nortriptyline 10 mg about a month ago for migraines as well as IBS and anxiety.  Has not really noticed any effects of this medication so far.  Her migraines are typically worse with her periods.      Review of Systems   Const, neuro reviewed,  otherwise negative unless noted above.        Objective           Vitals:  No vitals were obtained today due to virtual visit.    Physical Exam   GENERAL: Healthy, alert and no distress  EYES: Eyes grossly normal to inspection.  No discharge or erythema, or obvious scleral/conjunctival abnormalities.  RESP: No audible wheeze, cough, or visible cyanosis.  No visible retractions or increased work of breathing.    SKIN: Visible skin clear. No significant rash, abnormal pigmentation or lesions.  NEURO: Cranial nerves grossly intact.  Mentation and speech appropriate for age.  PSYCH: Mentation appears normal, affect normal/bright, judgement and insight intact, normal speech and appearance well-groomed.                Video-Visit Details    Type of service:  Video Visit    Video End Time: 4:52 PM    Originating Location (pt. Location): Home    Distant Location (provider location):  [facility]     Platform used for Video Visit: Meez

## 2021-01-14 RX ORDER — RIZATRIPTAN BENZOATE 5 MG/1
5-10 TABLET ORAL
Qty: 18 TABLET | Refills: 3 | Status: SHIPPED | OUTPATIENT
Start: 2021-01-14 | End: 2021-03-17

## 2021-01-15 DIAGNOSIS — G43.809 OTHER MIGRAINE WITHOUT STATUS MIGRAINOSUS, NOT INTRACTABLE: ICD-10-CM

## 2021-01-15 RX ORDER — TOPIRAMATE 25 MG/1
25 TABLET, FILM COATED ORAL DAILY
Qty: 60 TABLET | Refills: 2 | Status: SHIPPED | OUTPATIENT
Start: 2021-01-15 | End: 2021-04-23

## 2021-01-16 RX ORDER — NORTRIPTYLINE HCL 10 MG
10 CAPSULE ORAL AT BEDTIME
Qty: 90 CAPSULE | Refills: 3 | OUTPATIENT
Start: 2021-01-16

## 2021-01-23 ENCOUNTER — MYC MEDICAL ADVICE (OUTPATIENT)
Dept: FAMILY MEDICINE | Facility: CLINIC | Age: 26
End: 2021-01-23

## 2021-01-24 NOTE — TELEPHONE ENCOUNTER
Please see [employer] message and advise.      Thank you,  Vernell REBOLLEDORN BSN  [facility]  [phone]

## 2021-01-25 ENCOUNTER — MYC MEDICAL ADVICE (OUTPATIENT)
Dept: FAMILY MEDICINE | Facility: CLINIC | Age: 26
End: 2021-01-25

## 2021-01-25 DIAGNOSIS — Z30.41 ENCOUNTER FOR SURVEILLANCE OF CONTRACEPTIVE PILLS: Primary | ICD-10-CM

## 2021-01-26 ENCOUNTER — MYC MEDICAL ADVICE (OUTPATIENT)
Dept: FAMILY MEDICINE | Facility: CLINIC | Age: 26
End: 2021-01-26

## 2021-01-26 RX ORDER — DROSPIRENONE AND ETHINYL ESTRADIOL 0.03MG-3MG
1 KIT ORAL DAILY
Qty: 112 TABLET | Refills: 4 | Status: SHIPPED | OUTPATIENT
Start: 2021-01-26 | End: 2021-04-28

## 2021-02-05 ENCOUNTER — MYC MEDICAL ADVICE (OUTPATIENT)
Dept: FAMILY MEDICINE | Facility: CLINIC | Age: 26
End: 2021-02-05

## 2021-02-06 DIAGNOSIS — G43.809 OTHER MIGRAINE WITHOUT STATUS MIGRAINOSUS, NOT INTRACTABLE: ICD-10-CM

## 2021-02-06 RX ORDER — TOPIRAMATE 25 MG/1
25 TABLET, FILM COATED ORAL DAILY
Qty: 60 TABLET | Refills: 2 | OUTPATIENT
Start: 2021-02-06

## 2021-02-08 DIAGNOSIS — K58.0 IRRITABLE BOWEL SYNDROME WITH DIARRHEA: ICD-10-CM

## 2021-02-09 ENCOUNTER — MYC MEDICAL ADVICE (OUTPATIENT)
Dept: FAMILY MEDICINE | Facility: CLINIC | Age: 26
End: 2021-02-09

## 2021-02-09 RX ORDER — DICYCLOMINE HYDROCHLORIDE 10 MG/1
10 CAPSULE ORAL 4 TIMES DAILY PRN
Refills: 0
Start: 2021-02-09

## 2021-02-09 NOTE — TELEPHONE ENCOUNTER
Dicyclomine:     Discontinued 2/5/21 by outside provider  [employer]oda message sent to patient asking if she requested.    PCP is Sera Baca PA-C  Saw MP 12/18 for IBS    Emilee Bailey RN

## 2021-02-09 NOTE — TELEPHONE ENCOUNTER
Message sent by patient via [employer]:      *-*-*This message was handled on 2/9/2021 12:03 PM by KRZYSZTOF RIOS*-*-*    I did not request this, I apologize I think my pharmacy is automatically filling it.  I will call them and let them know to stop      RE: Refill         Emilee Rios, SOLANGE

## 2021-02-23 DIAGNOSIS — F41.1 GAD (GENERALIZED ANXIETY DISORDER): ICD-10-CM

## 2021-02-23 RX ORDER — FLUOXETINE 40 MG/1
CAPSULE ORAL
Qty: 90 CAPSULE | Refills: 3 | Status: SHIPPED | OUTPATIENT
Start: 2021-02-23 | End: 2022-02-28

## 2021-03-17 ENCOUNTER — OFFICE VISIT (OUTPATIENT)
Dept: FAMILY MEDICINE | Facility: CLINIC | Age: 26
End: 2021-03-17
Payer: COMMERCIAL

## 2021-03-17 VITALS
OXYGEN SATURATION: 98 % | WEIGHT: 158 LBS | HEIGHT: 68 IN | BODY MASS INDEX: 23.95 KG/M2 | SYSTOLIC BLOOD PRESSURE: 131 MMHG | RESPIRATION RATE: 16 BRPM | DIASTOLIC BLOOD PRESSURE: 80 MMHG

## 2021-03-17 DIAGNOSIS — J34.3 NASAL TURBINATE HYPERTROPHY: ICD-10-CM

## 2021-03-17 DIAGNOSIS — H69.92 DYSFUNCTION OF LEFT EUSTACHIAN TUBE: ICD-10-CM

## 2021-03-17 DIAGNOSIS — G43.001 MIGRAINE WITHOUT AURA AND WITH STATUS MIGRAINOSUS, NOT INTRACTABLE: Primary | ICD-10-CM

## 2021-03-17 PROCEDURE — 99214 OFFICE O/P EST MOD 30 MIN: CPT | Performed by: FAMILY MEDICINE

## 2021-03-17 RX ORDER — SUMATRIPTAN 50 MG/1
50 TABLET, FILM COATED ORAL
Qty: 12 TABLET | Refills: 0 | Status: SHIPPED | OUTPATIENT
Start: 2021-03-17 | End: 2021-05-25

## 2021-03-17 ASSESSMENT — MIFFLIN-ST. JEOR: SCORE: 1495.42

## 2021-03-17 NOTE — PROGRESS NOTES
Assessment & Plan     Migraine without aura and with status migrainosus, not intractable  Assessment: We had an extended discussion regarding persistent headaches.  The patient fits the diagnostic criteria for migraines.  She has unilateral headaches with sensitivity to lights and sounds and nausea.  She is currently taking rizatriptan but desires to switch back to sumatriptan.  She states that her headaches were milder with the sumatriptan.  She is also currently taking Topamax for prevention.  Other medications for migraine prevention's were discussed such as Depakote or amitriptyline.  The patient is not a candidate for amitriptyline because she is currently taking Prozac.  I am hesitant starting Depakote as an outpatient without of neurology consultation first.  Plan:  - NEUROLOGY ADULT REFERRAL  - SUMAtriptan (IMITREX) 50 MG tablet; Take 1 tablet (50 mg) by mouth at onset of headache for migraine May repeat in 2 hours. Max 4 tablets/24 hours.  -If the migraine persisted I would consider a beta-blocker for prevention.    Dysfunction of left eustachian tube  Patient clearly has eustachian tube dysfunction on the left.  She was advised to start an over-the-counter antihistamine.    Nasal turbinate hypertrophy  Swollen nasal turbinates bilaterally.  No nasal drainage noted.  No pressure on her maxillary sinuses.  Patient was informed that it is unlikely that she is walking with chronic sinusitis.  If her sinus symptoms persisted I would definitely consider getting imaging studies to her sinuses.        Patient was advised to schedule appointment with neurology.  Advised her to start an over-the-counter  antihistamine to help with the eustachian tube dysfunction and nasal turbinate hypertrophy.  Return to clinic if signs symptoms persisted and we will order a CT scan of the sinus area..    30 minutes spent on the date of the encounter doing chart review, history and exam, documentation and further activities as  "noted above         Return in about 2 weeks (around 3/31/2021) for Follow-up visit-  if symptoms fails to improve.    Sarah Castillo MD  [facility]   Joaquina is a 25 year old who presents for the following health issues:  HPI   25-year-old who presents to clinic for evaluation of multiple symptoms.  He has a medical diagnosis of intermittent prior migraines.  She is currently taking rizatriptan and Topamax.  She presents to the clinic to establish care and to discuss persistent sinus symptoms.  For headaches, the patient had headaches most of her life.  The frequency of the headaches increased in her early 20s.  She was seen and evaluated by a physician underwent a CT scan and finally diagnosed with migraines.  She states that normally she wakes up with a headache.  Pain is on the scale of 6/10.  Sometimes increases to 8 out of 10.  Headaches normally last all day.  Sometimes they last for a few days.  Most of her that her headaches are unilateral.  Sometimes on the left and sometimes on the right.  Headaches are associated with nausea, photophobia and sensitivity to sounds.  In terms of her sinus, she has been noticing pressure sensation above both eyebrows.  Mild sinus pressure on the left side.  Also having a left-sided headache today.  She denies any drainage out of her nostrils.  Reports that she used Afrin for many years when she was younger and has persistent nasal congestion.  She denies any changes to her hearing.  Denies any changes to her vision.  Does not recall a history of ear infections or sinus infections in the past.  She has not tried any over-the-counter medications for sinus symptoms.    Review of Systems   Constitutional, HEENT, cardiovascular, pulmonary, gi and gu systems are negative, except as otherwise noted.      Objective    /80   Resp 16   Ht 1.72 m (5' 7.7\")   Wt 71.7 kg (158 lb)   SpO2 98%   BMI 24.24 kg/m    Body mass index is 24.24 " kg/m .  Physical Exam   GENERAL: healthy, alert and no distress  EYES: Eyes grossly normal to inspection, PERRL and conjunctivae and sclerae normal  HENT: Mild clear fluid effusion behind the left tympanic membrane.  Nose and mouth without ulcers or lesions  NECK: no adenopathy, no asymmetry, masses, or scars and thyroid normal to palpation  RESP: lungs clear to auscultation - no rales, rhonchi or wheezes  CV: regular rate and rhythm, normal S1 S2, no S3 or S4, no murmur, click or rub, no peripheral edema and peripheral pulses strong

## 2021-03-17 NOTE — PATIENT INSTRUCTIONS
Patient was advised to schedule appointment with neurology.  Advised her to start an over-the-counter  antihistamine to help with the eustachian tube dysfunction and nasal turbinate hypertrophy.  Return to clinic if signs symptoms persisted and we will order a CT scan of the sinus area.

## 2021-03-18 DIAGNOSIS — G43.809 OTHER MIGRAINE WITHOUT STATUS MIGRAINOSUS, NOT INTRACTABLE: ICD-10-CM

## 2021-03-18 RX ORDER — TOPIRAMATE 25 MG/1
25 TABLET, FILM COATED ORAL DAILY
Qty: 60 TABLET | Refills: 2 | OUTPATIENT
Start: 2021-03-18

## 2021-03-19 ENCOUNTER — MYC MEDICAL ADVICE (OUTPATIENT)
Dept: FAMILY MEDICINE | Facility: CLINIC | Age: 26
End: 2021-03-19

## 2021-03-19 DIAGNOSIS — J01.90 ACUTE SINUSITIS WITH SYMPTOMS > 10 DAYS: Primary | ICD-10-CM

## 2021-03-22 NOTE — TELEPHONE ENCOUNTER
Dear Joaquina  I would recommend a trial of antibiotics. I sent a prescription to your pharmacy. Augmentin x 7 days. If your sinus pressure is secondary to acute sinusitis, this will help resolve it. I hope you feel better.     Best regards  Sarah Castillo MD.

## 2021-04-15 ENCOUNTER — OFFICE VISIT (OUTPATIENT)
Dept: FAMILY MEDICINE | Facility: CLINIC | Age: 26
End: 2021-04-15
Payer: COMMERCIAL

## 2021-04-15 VITALS
DIASTOLIC BLOOD PRESSURE: 86 MMHG | SYSTOLIC BLOOD PRESSURE: 132 MMHG | BODY MASS INDEX: 23.93 KG/M2 | HEART RATE: 111 BPM | HEIGHT: 68 IN | RESPIRATION RATE: 20 BRPM | TEMPERATURE: 97.7 F | OXYGEN SATURATION: 97 % | WEIGHT: 157.9 LBS

## 2021-04-15 DIAGNOSIS — J01.90 ACUTE SINUSITIS WITH SYMPTOMS > 10 DAYS: Primary | ICD-10-CM

## 2021-04-15 PROCEDURE — 99213 OFFICE O/P EST LOW 20 MIN: CPT | Performed by: FAMILY MEDICINE

## 2021-04-15 ASSESSMENT — MIFFLIN-ST. JEOR: SCORE: 1494.97

## 2021-04-15 NOTE — PATIENT INSTRUCTIONS
Step one: Sudafed 12 hour. Get the stuff behind the counter  Also try for only two days: Afrin (oxymetazoline)  Also fluticasone nasal spray daily    If that doesn't work try the antibiotic for 10 days    If you are using nasal steroid spray such as Nasonex or Flonase, spray it into your nose after any nasal irrigation.

## 2021-04-15 NOTE — PROGRESS NOTES
Assessment & Plan     Acute sinusitis with symptoms > 10 days  I think this is still a sinus related problem causing tribgger of underlying migraines    Patient Instructions       Step one: Sudafed 12 hour. Get the stuff behind the counter  Also try for only two days: Afrin (oxymetazoline)  Also fluticasone nasal spray daily    If that doesn't work try the antibiotic for 10 days    If you are using nasal steroid spray such as Nasonex or Flonase, spray it into your nose after any nasal irrigation.              No follow-ups on file.    Arnav Juan MD  [facility]   Joaquina is a 25 year old who presents for the following health issues     HPI     Migraine     Since your last clinic visit, how have your headaches changed?  No change    How often are you getting headaches or migraines? Couple times a week      Are you able to do normal daily activities when you have a migraine? sometimes    Are you taking rescue/relief medications? (Select all that apply) sumatriptan (Imitrex)    How helpful is your rescue/relief medication?  The relief is inconsistent    Are you taking any medications to prevent migraines? (Select all that apply)  Topamax    In the past 4 weeks, how often have you gone to urgent care or the emergency room because of your headaches?  0      How many servings of fruits and vegetables do you eat daily?  2-3    On average, how many sweetened beverages do you drink each day (Examples: soda, juice, sweet tea, etc.  Do NOT count diet or artificially sweetened beverages)?   0    How many days per week do you exercise enough to make your heart beat faster? 4    How many minutes a day do you exercise enough to make your heart beat faster? 20 - 29    How many days per week do you miss taking your medication? 0        Review of Systems   Constitutional, HEENT, cardiovascular, pulmonary, GI, , musculoskeletal, neuro, skin, endocrine and psych systems are negative,  "except as otherwise noted.      Objective    Resp 20   Ht 1.72 m (5' 7.7\")   Wt 71.6 kg (157 lb 14.4 oz)   LMP 03/29/2021 (Exact Date)   BMI 24.22 kg/m    Body mass index is 24.22 kg/m .  Physical Exam   GENERAL: healthy, alert and no distress  NEURO: Normal strength and tone, mentation intact and speech normal  PSYCH: mentation appears normal, affect normal/bright          "

## 2021-04-16 ENCOUNTER — TRANSFERRED RECORDS (OUTPATIENT)
Dept: HEALTH INFORMATION MANAGEMENT | Facility: CLINIC | Age: 26
End: 2021-04-16

## 2021-04-16 NOTE — TELEPHONE ENCOUNTER
JF,    Please see Gobblert message below from today.  Patient saw you yesterday.    Thanks,  Emilee Bailey RN

## 2021-04-19 ENCOUNTER — PRE VISIT (OUTPATIENT)
Dept: NEUROLOGY | Facility: CLINIC | Age: 26
End: 2021-04-19

## 2021-04-19 NOTE — TELEPHONE ENCOUNTER
FUTURE VISIT INFORMATION      FUTURE VISIT INFORMATION:    Date: 4/22/2021    Time: 7am    [place] [facility] [place]  REFERRAL INFORMATION:    Referring provider:  Dr. Castillo    Referring providers clinic:  [facility]     Reason for visit/diagnosis  Migraines     RECORDS REQUESTED FROM:       Clinic name Comments Records Status Imaging Status   Internal Dr. Castillo-3/17/2021    [facility]         [facility] ED Visit-4/16/2021 Care Everywhere N/A

## 2021-04-25 ENCOUNTER — E-VISIT (OUTPATIENT)
Dept: FAMILY MEDICINE | Facility: CLINIC | Age: 26
End: 2021-04-25
Payer: COMMERCIAL

## 2021-04-25 DIAGNOSIS — Z30.41 ENCOUNTER FOR SURVEILLANCE OF CONTRACEPTIVE PILLS: Primary | ICD-10-CM

## 2021-04-25 PROCEDURE — 99421 OL DIG E/M SVC 5-10 MIN: CPT | Performed by: FAMILY MEDICINE

## 2021-04-28 ENCOUNTER — MYC MEDICAL ADVICE (OUTPATIENT)
Dept: FAMILY MEDICINE | Facility: CLINIC | Age: 26
End: 2021-04-28

## 2021-04-28 DIAGNOSIS — J01.90 ACUTE SINUSITIS WITH SYMPTOMS > 10 DAYS: Primary | ICD-10-CM

## 2021-04-28 DIAGNOSIS — J34.3 NASAL TURBINATE HYPERTROPHY: ICD-10-CM

## 2021-04-28 RX ORDER — DROSPIRENONE AND ETHINYL ESTRADIOL 0.02-3(28)
1 KIT ORAL DAILY
Qty: 90 TABLET | Refills: 0 | Status: SHIPPED | OUTPATIENT
Start: 2021-04-28 | End: 2021-07-17

## 2021-04-29 ENCOUNTER — APPOINTMENT (OUTPATIENT)
Dept: MRI IMAGING | Facility: CLINIC | Age: 26
End: 2021-04-29
Attending: EMERGENCY MEDICINE
Payer: COMMERCIAL

## 2021-04-29 ENCOUNTER — MYC MEDICAL ADVICE (OUTPATIENT)
Dept: FAMILY MEDICINE | Facility: CLINIC | Age: 26
End: 2021-04-29

## 2021-04-29 ENCOUNTER — HOSPITAL ENCOUNTER (EMERGENCY)
Facility: CLINIC | Age: 26
Discharge: HOME | End: 2021-04-29
Attending: EMERGENCY MEDICINE | Admitting: EMERGENCY MEDICINE
Payer: COMMERCIAL

## 2021-04-29 VITALS
HEIGHT: 67 IN | WEIGHT: 153 LBS | TEMPERATURE: 98 F | SYSTOLIC BLOOD PRESSURE: 115 MMHG | OXYGEN SATURATION: 98 % | HEART RATE: 100 BPM | DIASTOLIC BLOOD PRESSURE: 86 MMHG | RESPIRATION RATE: 18 BRPM | BODY MASS INDEX: 24.01 KG/M2

## 2021-04-29 DIAGNOSIS — G43.909 MIGRAINE WITHOUT STATUS MIGRAINOSUS, NOT INTRACTABLE, UNSPECIFIED MIGRAINE TYPE: ICD-10-CM

## 2021-04-29 PROCEDURE — 258N000003 HC RX IP 258 OP 636: Performed by: EMERGENCY MEDICINE

## 2021-04-29 PROCEDURE — 96375 TX/PRO/DX INJ NEW DRUG ADDON: CPT

## 2021-04-29 PROCEDURE — 70553 MRI BRAIN STEM W/O & W/DYE: CPT

## 2021-04-29 PROCEDURE — 96374 THER/PROPH/DIAG INJ IV PUSH: CPT | Mod: 59

## 2021-04-29 PROCEDURE — 99285 EMERGENCY DEPT VISIT HI MDM: CPT | Mod: 25

## 2021-04-29 PROCEDURE — 255N000002 HC RX 255 OP 636: Performed by: EMERGENCY MEDICINE

## 2021-04-29 PROCEDURE — 250N000011 HC RX IP 250 OP 636: Performed by: EMERGENCY MEDICINE

## 2021-04-29 PROCEDURE — 96361 HYDRATE IV INFUSION ADD-ON: CPT

## 2021-04-29 PROCEDURE — A9585 GADOBUTROL INJECTION: HCPCS | Performed by: EMERGENCY MEDICINE

## 2021-04-29 RX ORDER — CYCLOBENZAPRINE HCL 10 MG
10 TABLET ORAL 3 TIMES DAILY PRN
Qty: 20 TABLET | Refills: 0 | Status: SHIPPED | OUTPATIENT
Start: 2021-04-29 | End: 2021-05-06

## 2021-04-29 RX ORDER — SODIUM CHLORIDE 9 MG/ML
INJECTION, SOLUTION INTRAVENOUS CONTINUOUS
Status: DISCONTINUED | OUTPATIENT
Start: 2021-04-29 | End: 2021-04-29 | Stop reason: HOSPADM

## 2021-04-29 RX ORDER — PREDNISONE 10 MG/1
TABLET ORAL
Qty: 42 TABLET | Refills: 0 | Status: SHIPPED | OUTPATIENT
Start: 2021-04-29 | End: 2021-05-11

## 2021-04-29 RX ORDER — METOCLOPRAMIDE HYDROCHLORIDE 5 MG/ML
10 INJECTION INTRAMUSCULAR; INTRAVENOUS ONCE
Status: COMPLETED | OUTPATIENT
Start: 2021-04-29 | End: 2021-04-29

## 2021-04-29 RX ORDER — DIPHENHYDRAMINE HYDROCHLORIDE 50 MG/ML
25 INJECTION INTRAMUSCULAR; INTRAVENOUS ONCE
Status: COMPLETED | OUTPATIENT
Start: 2021-04-29 | End: 2021-04-29

## 2021-04-29 RX ORDER — GADOBUTROL 604.72 MG/ML
6 INJECTION INTRAVENOUS ONCE
Status: COMPLETED | OUTPATIENT
Start: 2021-04-29 | End: 2021-04-29

## 2021-04-29 RX ORDER — KETOROLAC TROMETHAMINE 15 MG/ML
15 INJECTION, SOLUTION INTRAMUSCULAR; INTRAVENOUS ONCE
Status: COMPLETED | OUTPATIENT
Start: 2021-04-29 | End: 2021-04-29

## 2021-04-29 RX ORDER — ZOLMITRIPTAN 5 MG/1
5 TABLET, FILM COATED ORAL
Qty: 12 TABLET | Refills: 0 | Status: SHIPPED | OUTPATIENT
Start: 2021-04-29 | End: 2021-07-20

## 2021-04-29 RX ADMIN — KETOROLAC TROMETHAMINE 15 MG: 15 INJECTION, SOLUTION INTRAMUSCULAR; INTRAVENOUS at 16:55

## 2021-04-29 RX ADMIN — SODIUM CHLORIDE 1000 ML: 9 INJECTION, SOLUTION INTRAVENOUS at 16:55

## 2021-04-29 RX ADMIN — DIPHENHYDRAMINE HYDROCHLORIDE 25 MG: 50 INJECTION, SOLUTION INTRAMUSCULAR; INTRAVENOUS at 16:55

## 2021-04-29 RX ADMIN — GADOBUTROL 6 ML: 604.72 INJECTION INTRAVENOUS at 19:03

## 2021-04-29 RX ADMIN — METOCLOPRAMIDE 10 MG: 5 INJECTION, SOLUTION INTRAMUSCULAR; INTRAVENOUS at 17:06

## 2021-04-29 ASSESSMENT — MIFFLIN-ST. JEOR: SCORE: 1461.63

## 2021-04-29 ASSESSMENT — ENCOUNTER SYMPTOMS
NAUSEA: 1
FEVER: 0
HEADACHES: 1
PHOTOPHOBIA: 1
WEAKNESS: 0
NECK PAIN: 1
NERVOUS/ANXIOUS: 1
VOMITING: 0
NUMBNESS: 0
DIZZINESS: 1

## 2021-04-29 NOTE — ED TRIAGE NOTES
Hx of HA. C/o HA and right sided neck pain started on Monday., also has nausea and dizziness.  Denies any injury.

## 2021-04-29 NOTE — TELEPHONE ENCOUNTER
JF  Please see ChinaPNRt message  Looks like pt just went to ER  Want to hold off until discharge?    Thank you,  Sharona Basilio RN

## 2021-04-29 NOTE — ED PROVIDER NOTES
History   Chief Complaint:  Neck Pain and Headache       HPI   Ary Lutz is a 25 year old female with history of headaches who presents with headache and neck pain.  The patient reports dealing with headaches for several years however they have been worse in the past year.  Her headaches are usually on the right side.  Lately she is having more sharp, ice pick-like pain behind her eye and inside her ear.  Sometimes she has sensitivity to light and nausea but not always.  She has not been able to identify a pattern to her headaches.  Sometimes it seems like there might be an association with her menstrual period but not always.  She just changed her birth control 2 days ago to lower the dose of estrogen.  There was a question whether her sinuses could be contributing to her headaches and she was started on decongestants and finished a course of antibiotics.  She has had some intermittent dizziness and right-sided neck pain since about the start of the year.  Her headaches do make her anxious and have increased her stress level.  Head CT in 2019 was normal.  She has been on Topamax 100 mg for headache prevention for 5 - 6 months but does not feel this is helping and if anything, her headaches have been more frequent.  She has Imitrex and does get about 5 hours of relief but then her headache comes back and if she takes another dose she gets rebound headaches.  She has made an appointment with neurology but this is not until July.  For the past 3 days she has had a persistent with associated right-sided neck pain, nausea, and dizziness.  Her headache is consistent in character and location with previous headaches and none of her other symptoms are new.  However, she is very frustrated at the persistent pain and lack of relief from any of her medications.      Review of Systems   Constitutional: Negative for fever.   Eyes: Positive for photophobia. Negative for visual disturbance.   Gastrointestinal:  "Positive for nausea. Negative for vomiting.   Musculoskeletal: Positive for neck pain.   Neurological: Positive for dizziness and headaches. Negative for weakness and numbness.   Psychiatric/Behavioral: The patient is nervous/anxious.    All other systems reviewed and are negative.    Allergies:  Bactrim    Medications:  Topamax  Imitrex  Joi  Hydroxyzine  Pantoprazole   Prozac    Past Medical History:    Headaches  Anxiety  Abnormal pap smear   Gastrointestinal esophageal reflux disease     Past Surgical History:    Esophagogastroduodenoscopy      Family History:    Thyroid cancer - mother   Hypertension - mother  Familial adenoma polyposis - father  Hyperlipidemia - father     Social History:  Presents to the ED alone  PCP: Yanick Baca     Physical Exam     Patient Vitals for the past 24 hrs:   BP Temp Temp src Pulse Resp SpO2 Height Weight   04/29/21 1946 126/86 -- -- 100 -- 99 % -- --   04/29/21 1615 137/81 98  F (36.7  C) Oral 111 18 98 % 1.702 m (5' 7\") 69.4 kg (153 lb)       Physical Exam  GENERAL: well developed, pleasant, appears uncomfortable  HEAD: atraumatic  EYES: pupils reactive, extraocular muscles intact, conjunctivae normal  ENT:  mucus membranes moist  NECK:  trachea midline, normal range of motion  RESPIRATORY: no tachypnea, breath sounds clear to auscultation   CVS: normal S1/S2, no murmurs, intact distal pulses  ABDOMEN: soft, nontender, nondistention  MUSCULOSKELETAL: no deformities  SKIN: warm and dry, no acute rashes or ulceration  NEURO: GCS 15, cranial nerves intact, alert and oriented x3, normal gait  PSYCH:  Mood/affect normal    Emergency Department Course     Imaging:  Brain MRI without and with contrast:  Normal brain MRI.  Report per radiology.     Emergency Department Course:  Reviewed:  I reviewed nursing notes, vitals and past medical history    Assessments:  (1630) I obtained history and examined the patient as noted above.   (2005) I rechecked the patient and explained " findings.     Consults:  (2012) I consulted with Dr. Bhumi Dean of neurology regarding the patient.     Interventions:  (1655) Normal Saline, 1 liter, IV bolus   (1655) Benadryl, 25 mg, IV injection    (1655) Toradol, 15 mg, IV injection   (1706) Reglan, 10 mg, IV injection      Disposition:  The patient was discharged to home.     Impression & Plan     Medical Decision Making:  Presents tearful and struggling with almost daily headaches and migraines.  She has been taking Topamax and Imitrex.  She notes that she is needing Imitrex almost on a daily basis.  She has tried to not take the Imitrex as she is concerned about rebound headaches but not seen on going in persistent headaches.  She has not had any new symptoms in terms of concerns for dissection as she does have some neck pain and dizziness component to her headaches but most of her symptoms sound to be ongoing for the last several months at least 8 months.  She has no tenderness to the facial bones or frontal sinuses.  MRI was obtained and is negative for findings as noted above.  Headache was an 8 out of 10 when she arrived and currently is a 3 out of 10 and feels comfortable going home with this.  I did reach out to neurology given her frustration in several months out until she can get into see them.  Was suggested that she is likely overusing the Imitrex and is getting a medication induced migraine.  Suggestion is physical therapy to help with tense muscles, 12-day tapering dose of prednisone starting at 60 mg for 2 days and tapering down, trying to limit the Imitrex and switching over to a new triptan, and muscle relaxer.  Discussed all that with the patient.    An overview treatment plan is as such; stop Imitrex, continue Topamax, start zolmitriptan 5 mg, prednisone taper starting at 60 mg for 12 days and tapering every 2 days, physical therapy for stiff neck and muscle tension, muscle relaxer and following up with her primary care doctor and  neurology in July as scheduled.    Diagnosis:    ICD-10-CM    1. Migraine without status migrainosus, not intractable, unspecified migraine type  G43.909 PHYSICAL THERAPY REFERRAL       Discharge Medications:  New Prescriptions    CYCLOBENZAPRINE (FLEXERIL) 10 MG TABLET    Take 1 tablet (10 mg) by mouth 3 times daily as needed for muscle spasms    PREDNISONE (DELTASONE) 10 MG TABLET    Take 6 tablets (60 mg) by mouth daily for 2 days, THEN 5 tablets (50 mg) daily for 2 days, THEN 4 tablets (40 mg) daily for 2 days, THEN 3 tablets (30 mg) daily for 2 days, THEN 2 tablets (20 mg) daily for 2 days, THEN 1 tablet (10 mg) daily for 2 days. Take 4 tablets daily for 5 days,  take 2 tablets daily for 3 days, take 1 tablet daily for 3 days, take half a tablet for 3 days.    ZOLMITRIPTAN (ZOMIG) 5 MG TABLET    Take 1 tablet (5 mg) by mouth at onset of headache for migraine May repeat in 2 hours. Max 2 tablets/24 hours.       Scribe Disclosure:  I, Adelita Eubanks, am serving as a scribe at 4:30 PM on 4/29/2021 to document services personally performed by Hemal Lainez MD based on my observations and the provider's statements to me.         Hemal Lainez MD  04/29/21 2034

## 2021-05-25 ENCOUNTER — VIRTUAL VISIT (OUTPATIENT)
Dept: NEUROLOGY | Facility: CLINIC | Age: 26
End: 2021-05-25
Attending: FAMILY MEDICINE
Payer: COMMERCIAL

## 2021-05-25 ENCOUNTER — TELEPHONE (OUTPATIENT)
Dept: NEUROLOGY | Facility: CLINIC | Age: 26
End: 2021-05-25

## 2021-05-25 DIAGNOSIS — G43.719 INTRACTABLE CHRONIC MIGRAINE WITHOUT AURA AND WITHOUT STATUS MIGRAINOSUS: Primary | ICD-10-CM

## 2021-05-25 PROCEDURE — 99204 OFFICE O/P NEW MOD 45 MIN: CPT | Mod: GT | Performed by: NURSE PRACTITIONER

## 2021-05-25 RX ORDER — ZOLMITRIPTAN 5 MG/1
1 TABLET, ORALLY DISINTEGRATING ORAL PRN
COMMUNITY
Start: 2021-04-30 | End: 2021-05-25

## 2021-05-25 NOTE — TELEPHONE ENCOUNTER
Prior Authorization Retail Medication Request    Medication/Dose: Emgality 240 mg loading dose then 120 mg every 28 days  ICD code (if different than what is on RX):  Chronic migraine  Previously Tried and Failed:  Topiramate,sumatriptan,zolmitriptan,ibuprofen,excedrin,Would avoid TCA -already on antidepressant and BBB due to history of depression and may limit patient's exercise/work out tolerance    Rationale:  Headaches usually last 4-5 days and frequency 3-4 per month. Total over 15 headache days per month.   Pain is usually on the right side and above eye brow and cheek sometimes and sometimes goes to the left. Pain feels throb and sharp. Headache gets worse by the end of the day. Associated with photophobia and phonophobia sometimes, nausea, fatigue and cancels activity and cannot sleep.     Insurance Name:  [employer]  Insurance ID:  [id number]      Pharmacy Information (if different than what is on RX)  Name:    Phone:

## 2021-05-25 NOTE — LETTER
5/25/2021       RE: Ary Lutz  [address]  [address]  [place] [address]     Dear Colleague,    Thank you for referring your patient, Ary Lutz, to the [facility]UROLOGY [facility] [place] at [facility]. Please see a copy of my visit note below.    Joaquina is a 25 year old who is being evaluated via a billable video visit.      How would you like to obtain your AVS? MyChart  If the video visit is dropped, the invitation should be resent by: Send to e-mail at: [email or web address]  Will anyone else be joining your video visit? No      Video Start Time: 9:17 AM    ASSESSMENT AND PLAN:  Headache and headache symptoms appear to be migrainous in etiology.    Discussed headache prevention and reviewed newly FDA approved treatment.   Would avoid TCA -already on antidepressant and BBB due to history of depression and may limit patient's exercise/work out tolerance.   Headache treatment plan:  Headache prevention-A trial of Emgality PA  Recommended a trial of migraine preventive treatment with Emgality. Side effects-allergic reaction or pain in the injection side or redness in the injection side. Unknown side effects with a long term use. Pregnancy is contraindicated.   Vitamin B2 -200-400 mg OTC  Acute treatment  Zolmitriptan as needed. Limit use to no more than 9 days per month.   A trial of prochlorperazine 5-10 mg every 6 hours as needed for nausea, vomiting and acute migraine headache  Follow up in 3 months after starting Emgality       Subjective   Joaquina is a 25 year old who presents for the following health issues -headache  HPI   Headaches more debilitating and severe since getting older. Headaches all patient's life. Recent ED visit on 4/29/2021. Reports that last headache recently when was on her menstrual period.   Headaches usually last 4-5 days and frequency 3-4 per month. Total over 15 headache days per month.   Pain is usually  on the right side and above eye brow and cheek sometimes and sometimes goes to the left. Pain feels throb and sharp. Headache gets worse by the end of the day. Associated with photophobia and phonophobia sometimes, nausea, fatigue and cancels activity and cannot sleep.   Sleeps Ok otherwise if no headache  On antidepressant-prozac for about 5 years -gets stressed from headaches and has been getting more stressed from headaches.    Headache treatment:  Topiramate 50 mg now and tried 100 mg but side effects and affect of birth control-5-6 months ago.   Sumatriptan -for a while and did not work   Zolmitriptan -as needed  Ibuprofen with benedryl or Excedrin migraine -helps sometimes and ibuprofen does not seem to work so stopped it. Does not take OTC meds due to ineffectiveness.     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      Objective       Vitals:  No vitals were obtained today due to virtual visit.    Physical Exam   No headache today   GENERAL: Healthy, alert and no distress  EYES: Eyes grossly normal to inspection.  No discharge or erythema, or obvious scleral/conjunctival abnormalities.  RESP: No audible wheeze, cough, or visible cyanosis.  No visible retractions or increased work of breathing.    SKIN: Visible skin clear.   NEURO: Cranial nerves grossly intact.  Mentation and speech appropriate for age.  PSYCH: Mentation appears normal, affect normal/bright, judgement and insight intact, normal speech and appearance well-groomed.    Diagnostic Test Results reviewed  MRI OF THE BRAIN  WITHOUT AND WITH CONTRAST 4/29/2021 7:39 PM      COMPARISON: Head CT 1/15/2019     HISTORY: Dizziness, nonspecific. Migraine .     TECHNIQUE: Multi-sequence, multi-planar MRI images of the brain were  acquired before and after the administration of IV gadolinium (6 mL  Gadavist ).     FINDINGS: The ventricles and basal cisterns are normal in  configuration. There is no midline shift. There are no extra-axial  fluid collections. Gray-white differentiation is well maintained.  There is no evidence for stroke or acute intracranial hemorrhage.  There is no abnormal contrast enhancement in the brain or its  coverings.     There is no sinusitis or mastoiditis.                                                                      IMPRESSION: Normal brain MRI.     FELICE MOSLEY MD    I discussed all my recommendations with Ary Lutz who verbalizes understanding and comfortable with the plan.  All of patient's questions were answered from the best of my knowledge.  Patient is in agreement with the plan.     45 minutes spent on the date of the encounter doing chart review, history and exam, documentation and further activities as noted above    ROBE Urbina, CNP [facility]  Headache certified  [facility]      Video-Visit Details    Type of service:  Video Visit    Video End Time:9:52 AM    Originating Location (pt. Location): Home    Distant Location (provider location):  [facility] [place]     Platform used for Video Visit: [employer]

## 2021-05-25 NOTE — PROGRESS NOTES
Joaquina is a 25 year old who is being evaluated via a billable video visit.      How would you like to obtain your AVS? MyChart  If the video visit is dropped, the invitation should be resent by: Send to e-mail at: [email or web address]  Will anyone else be joining your video visit? No      Video Start Time: 9:17 AM    ASSESSMENT AND PLAN:  Headache and headache symptoms appear to be migrainous in etiology.    Discussed headache prevention and reviewed newly FDA approved treatment.   Would avoid TCA -already on antidepressant and BBB due to history of depression and may limit patient's exercise/work out tolerance.   Headache treatment plan:  Headache prevention-A trial of Emgality PA  Recommended a trial of migraine preventive treatment with Emgality. Side effects-allergic reaction or pain in the injection side or redness in the injection side. Unknown side effects with a long term use. Pregnancy is contraindicated.   Vitamin B2 -200-400 mg OTC  Acute treatment  Zolmitriptan as needed. Limit use to no more than 9 days per month.   A trial of prochlorperazine 5-10 mg every 6 hours as needed for nausea, vomiting and acute migraine headache  Follow up in 3 months after starting Emgality       Subjective   Joaquina is a 25 year old who presents for the following health issues -headache  HPI   Headaches more debilitating and severe since getting older. Headaches all patient's life. Recent ED visit on 4/29/2021. Reports that last headache recently when was on her menstrual period.   Headaches usually last 4-5 days and frequency 3-4 per month. Total over 15 headache days per month.   Pain is usually on the right side and above eye brow and cheek sometimes and sometimes goes to the left. Pain feels throb and sharp. Headache gets worse by the end of the day. Associated with photophobia and phonophobia sometimes, nausea, fatigue and cancels activity and cannot sleep.   Sleeps Ok otherwise if no headache  On  antidepressant-prozac for about 5 years -gets stressed from headaches and has been getting more stressed from headaches.    Headache treatment:  Topiramate 50 mg now and tried 100 mg but side effects and affect of birth control-5-6 months ago.   Sumatriptan -for a while and did not work   Zolmitriptan -as needed  Ibuprofen with benedryl or Excedrin migraine -helps sometimes and ibuprofen does not seem to work so stopped it. Does not take OTC meds due to ineffectiveness.     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      Objective       Vitals:  No vitals were obtained today due to virtual visit.    Physical Exam   No headache today   GENERAL: Healthy, alert and no distress  EYES: Eyes grossly normal to inspection.  No discharge or erythema, or obvious scleral/conjunctival abnormalities.  RESP: No audible wheeze, cough, or visible cyanosis.  No visible retractions or increased work of breathing.    SKIN: Visible skin clear.   NEURO: Cranial nerves grossly intact.  Mentation and speech appropriate for age.  PSYCH: Mentation appears normal, affect normal/bright, judgement and insight intact, normal speech and appearance well-groomed.    Diagnostic Test Results reviewed  MRI OF THE BRAIN WITHOUT AND WITH CONTRAST 4/29/2021 7:39 PM      COMPARISON: Head CT 1/15/2019     HISTORY: Dizziness, nonspecific. Migraine .     TECHNIQUE: Multi-sequence, multi-planar MRI images of the brain were  acquired before and after the administration of IV gadolinium (6 mL  Gadavist ).     FINDINGS: The ventricles and  basal cisterns are normal in  configuration. There is no midline shift. There are no extra-axial  fluid collections. Gray-white differentiation is well maintained.  There is no evidence for stroke or acute intracranial hemorrhage.  There is no abnormal contrast enhancement in the brain or its  coverings.     There is no sinusitis or mastoiditis.                                                                      IMPRESSION: Normal brain MRI.     FELICE MOSLEY MD    I discussed all my recommendations with Ary Lutz who verbalizes understanding and comfortable with the plan.  All of patient's questions were answered from the best of my knowledge.  Patient is in agreement with the plan.     45 minutes spent on the date of the encounter doing chart review, history and exam, documentation and further activities as noted above    ROBE Urbina, CNP [facility]  Headache certified  [facility]         Video-Visit Details    Type of service:  Video Visit    Video End Time:9:52 AM    Originating Location (pt. Location): Home    Distant Location (provider location):  [facility] NEUROLOGY CLINIC [place]     Platform used for Video Visit: [employer]

## 2021-05-26 NOTE — TELEPHONE ENCOUNTER
Central Prior Authorization Team   Phone: [phone]    PA Initiation    Medication: Emgality 240 mg loading dose then 120 mg every 28 days  Insurance Company: [employer] - Phone [phone] Fax [phone]  Pharmacy Filling the Rx: [id number] [employer] - [place], [place] - [address]  Filling Pharmacy Phone: [phone]  Filling Pharmacy Fax: [phone]  Start Date: 5/26/2021

## 2021-05-26 NOTE — TELEPHONE ENCOUNTER
Prior Authorization Approval    Authorization Effective Date: 5/25/2021  Authorization Expiration Date: 11/26/2021  Medication: Emgality 240 mg loading dose then 120 mg every 28 days-APPROVED  Approved Dose/Quantity:    Reference #:     Insurance Company: [employer] - Phone [phone] Fax [phone]  Expected CoPay:       CoPay Card Available:      Foundation Assistance Needed:    Which Pharmacy is filling the prescription (Not needed for infusion/clinic administered): [id number] [place], [place] - [address] [place] [address]  Pharmacy Notified: Yes  Patient Notified: Yes  **Instructed pharmacy to notify patient when script is ready to /ship.**

## 2021-06-07 NOTE — PATIENT INSTRUCTIONS
Headache treatment plan:  Headache prevention-A trial of Emgality PA  Recommended a trial of migraine preventive treatment with Emgality. Side effects-allergic reaction or pain in the injection side or redness in the injection side. Unknown side effects with a long term use. Pregnancy is contraindicated.   Vitamin B2 -200-400 mg OTC  Acute treatment  Zolmitriptan as needed. Limit use to no more than 9 days per month.   A trial of prochlorperazine 5-10 mg every 6 hours as needed for nausea, vomiting and acute migraine headache  Follow up in 3 months after starting Emgality

## 2021-06-13 DIAGNOSIS — K58.0 IRRITABLE BOWEL SYNDROME WITH DIARRHEA: ICD-10-CM

## 2021-06-14 NOTE — TELEPHONE ENCOUNTER
Not on active med list  Left message for patient to call [facility] RicharWyandanchalessandra HOSKINS RN

## 2021-06-16 ENCOUNTER — MYC MEDICAL ADVICE (OUTPATIENT)
Dept: FAMILY MEDICINE | Facility: CLINIC | Age: 26
End: 2021-06-16

## 2021-06-16 RX ORDER — DICYCLOMINE HYDROCHLORIDE 10 MG/1
10 CAPSULE ORAL 4 TIMES DAILY PRN
Start: 2021-06-16

## 2021-06-16 NOTE — TELEPHONE ENCOUNTER
[employer] message sent by patient regarding refill:    I did not.  It was probably a mistake on my pharmacy's end. I can let them know to disregard.     Thank you,     Joaquina Bailey RN

## 2021-07-07 ENCOUNTER — VIRTUAL VISIT (OUTPATIENT)
Dept: NEUROLOGY | Facility: CLINIC | Age: 26
End: 2021-07-07
Payer: COMMERCIAL

## 2021-07-07 DIAGNOSIS — G43.719 INTRACTABLE CHRONIC MIGRAINE WITHOUT AURA AND WITHOUT STATUS MIGRAINOSUS: Primary | ICD-10-CM

## 2021-07-07 PROCEDURE — 99214 OFFICE O/P EST MOD 30 MIN: CPT | Mod: GT | Performed by: NURSE PRACTITIONER

## 2021-07-07 NOTE — PROGRESS NOTES
MIGRAINE DISABILITY ASSESSMENT (MIDAS)    On how many days in the last 3 months did you miss work or school because of your headaches?  2    How many days in the last 3 months was your productivity at work or school reduced by half or more because of your headaches? (Do not include days you counted in question 1 where you missed work or school.)  2    On how many days in the last 3 months did you not do household work (such as housework, home repairs and maintenance, shopping, caring for children and relatives) because of your headaches?  2    How many days in the last 3 months was your productivity in household work reduced by half or more because of your headaches? (Do not include days you counted in question 3 where you did not do household work).  0    On how many days in the last 3 months did you miss family, social, or lesiure activities because of your headaches?  2    MIDAS Total Score: 8    On how many days in the last 3 months did you have a headache? (If a headache lasted more than 1 day, count each day.)   15    On a scale of 0 - 10, on average how painful were these headaches (where 0 = no pain at all, and 10 = pain as bad as it can be.)  7

## 2021-07-07 NOTE — LETTER
7/7/2021       RE: Ary Lutz  [address]  [place] [address]     Dear Colleague,    Thank you for referring your patient, Ary Lutz, to the [facility] NEUROLOGY CLINIC [place] at [facility]. Please see a copy of my visit note below.    Joaquina is a 26 year old who is being evaluated via a billable video visit.      How would you like to obtain your AVS? MyChart  If the video visit is dropped, the invitation should be resent by:   Will anyone else be joining your video visit? No      Video Start Time: 4:08 PM  Video-Visit Details    Type of service:  Video Visit    Video End Time:4:23 PM    Originating Location (pt. Location): Home    Distant Location (provider location):  [facility] NEUROLOGY [facility]     Platform used for Video Visit: [employer]     Reason for visit headache follow-up   interval history:  This is a 26-year-old who presents to [facility] headache clinic for headache follow-up.  Initial headache visit 5/25/2021, see note for details.  It was recommended a trial of Emgality which was discussed with the patient during initial visit.  Today patient reports that she has health anxiety and would like to discuss other options besides Emgality because its a newly approved medications and concerned of potentially unknown side effects.  Has been taking topiramate and not sure if helping-only 50/day because of OCP interaction concerns.Wonders if Ok to keep taking topiramate on the prolonged time . Not clear if beneficial.   Zolmitriptan and helped with headache took it twice and took prochlorperazine for nausea  A lot of headaches in neck and shoulders and has a PT appointment.   Headache frequency 30 headache days after 30 headache days per month.  Duration more than 4 hours.  Reviewed migraine headache prevention was a trial of Botox which has been FDA approved for at least 10 to 15 years.  No side effects  reviewed.  Patient accepts side effects and would like to try Botox for chronic migraine prevention which was discussed with the patient.    Plan:  A trial of Botox.  We will submit PA   Will not proceed with Emgality for now due to patient's concerns  Rescue migraine headache treatment -zolmitriptan as needed.   Follow up after a round or two of Botox    PMH, allergies and current prescription medications reviewed    7 point ROS of systems including Constitutional, Eyes, Respiratory, Cardiovascular, Gastroenterology, Neurological, Psychiatric were reviewed and no concerns reported today unless as mentioned above    Assessment and plan: As discussed above      I discussed all my recommendations with Ary Lutz who verbalizes understanding and comfortable with the plan.  All of patient's questions were answered from the best of my knowledge.  Patient is in agreement with the plan.     32 minutes spent on the date of the encounter doing chart review, history and exam, documentation and further activities as noted above    ROBE Urbina, CNP [facility]  Headache certified  [facility]      MIGRAINE DISABILITY ASSESSMENT (MIDAS)    On how many days in the last 3 months did you miss work or school because of your headaches?  2    How many days in the last 3 months was your productivity at work or school reduced by half or more because of your headaches? (Do not include days you counted in question 1 where you missed work or school.)  2    On how many days in the last 3 months did you not do household work (such as housework, home repairs and maintenance, shopping, caring for children and relatives) because of your headaches?  2    How many days in the last 3 months was your productivity in household work reduced by half or more because of your headaches? (Do not include days you counted in question 3 where you did not do household work).  0    On how many days in the last 3 months did you miss  family, social, or lesiure activities because of your headaches?  2    MIDAS Total Score: 8    On how many days in the last 3 months did you have a headache? (If a headache lasted more than 1 day, count each day.)   15    On a scale of 0 - 10, on average how painful were these headaches (where 0 = no pain at all, and 10 = pain as bad as it can be.)  7      Again, thank you for allowing me to participate in the care of your patient.      Sincerely,    ROBE Vargas CNP

## 2021-07-07 NOTE — PROGRESS NOTES
Joaquina is a 26 year old who is being evaluated via a billable video visit.      How would you like to obtain your AVS? MyChart  If the video visit is dropped, the invitation should be resent by:   Will anyone else be joining your video visit? No      Video Start Time: 4:08 PM  Video-Visit Details    Type of service:  Video Visit    Video End Time:4:23 PM    Originating Location (pt. Location): Home    Distant Location (provider location):  [facility] NEUROLOGY [facility] [place]     Platform used for Video Visit: [employer]     Reason for visit headache follow-up   interval history:  This is a 26-year-old who presents to [facility] headache clinic for headache follow-up.  Initial headache visit 5/25/2021, see note for details.  It was recommended a trial of Emgality which was discussed with the patient during initial visit.  Today patient reports that she has health anxiety and would like to discuss other options besides Emgality because its a newly approved medications and concerned of potentially unknown side effects.  Has been taking topiramate and not sure if helping-only 50/day because of OCP interaction concerns.Wonders if Ok to keep taking topiramate on the prolonged time . Not clear if beneficial.   Zolmitriptan and helped with headache took it twice and took prochlorperazine for nausea  A lot of headaches in neck and shoulders and has a PT appointment.   Headache frequency 30 headache days after 30 headache days per month.  Duration more than 4 hours.  Reviewed migraine headache prevention was a trial of Botox which has been FDA approved for at least 10 to 15 years.  No side effects reviewed.  Patient accepts side effects and would like to try Botox for chronic migraine prevention which was discussed with the patient.    Plan:  A trial of Botox.  We will submit PA   Will not proceed with Emgality for now due to patient's concerns  Rescue migraine headache treatment -zolmitriptan as needed.   Follow up after  a round or two of Botox    PMH, allergies and current prescription medications reviewed    7 point ROS of systems including Constitutional, Eyes, Respiratory, Cardiovascular, Gastroenterology, Neurological, Psychiatric were reviewed and no concerns reported today unless as mentioned above    Assessment and plan: As discussed above      I discussed all my recommendations with Ary Lutz who verbalizes understanding and comfortable with the plan.  All of patient's questions were answered from the best of my knowledge.  Patient is in agreement with the plan.     32 minutes spent on the date of the encounter doing chart review, history and exam, documentation and further activities as noted above    ROBE Urbina, CNP [facility]  Headache certified  [facility]

## 2021-07-14 ASSESSMENT — ENCOUNTER SYMPTOMS
JOINT SWELLING: 0
EYE PAIN: 0
COUGH: 1
HEARTBURN: 0
WEAKNESS: 0
CONSTIPATION: 0
MYALGIAS: 0
DIZZINESS: 0
HEMATURIA: 0
CHILLS: 0
SORE THROAT: 0
SHORTNESS OF BREATH: 0
NERVOUS/ANXIOUS: 0
ABDOMINAL PAIN: 0
PALPITATIONS: 0
BREAST MASS: 0
PARESTHESIAS: 0
FREQUENCY: 0
HEMATOCHEZIA: 0
DYSURIA: 0
DIARRHEA: 0
HEADACHES: 0
ARTHRALGIAS: 0
FEVER: 0
NAUSEA: 0

## 2021-07-14 NOTE — PROGRESS NOTES
SUBJECTIVE:   CC: Ary Lutz is an 26 year old woman who presents for preventive health visit.     Patient has been advised of split billing requirements and indicates understanding: Yes  Healthy Habits:     Getting at least 3 servings of Calcium per day:  Yes    Bi-annual eye exam:  Yes    Dental care twice a year:  Yes    Sleep apnea or symptoms of sleep apnea:  None    Diet:  Regular (no restrictions)    Frequency of exercise:  2-3 days/week    Duration of exercise:  15-30 minutes    Taking medications regularly:  Yes    Medication side effects:  Lightheadedness    PHQ-2 Total Score: 0    Additional concerns today:  No    Patient is taking prozac for anxiety. Doing well with medications. Desires to conceive in the future.   Today's PHQ-2 Score:   PHQ-2 ( 1999 [employer]) 7/15/2021   Q1: Little interest or pleasure in doing things 0   Q2: Feeling down, depressed or hopeless 0   PHQ-2 Score 0   Q1: Little interest or pleasure in doing things Not at all   Q2: Feeling down, depressed or hopeless Not at all   PHQ-2 Score 0       PHQ 4/15/2019 7/8/2020 10/30/2020   PHQ-9 Total Score 0 2 1   Q9: Thoughts of better off dead/self-harm past 2 weeks Not at all Not at all Not at all     SCOTT-7 SCORE 4/15/2019 7/8/2020 10/30/2020   Total Score 3 10 4     Taking prozac for anxiety. Doing well no concerns.     Abuse: Current or Past (Physical, Sexual or Emotional) - No  Do you feel safe in your environment? Yes    Have you ever done Advance Care Planning? (For example, a Health Directive, POLST, or a discussion with a medical provider or your loved ones about your wishes): No, advance care planning information given to patient to review.  Patient declined advance care planning discussion at this time.    Social History     Tobacco Use     Smoking status: Never Smoker     Smokeless tobacco: Never Used   Substance Use Topics     Alcohol use: No     Alcohol/week: 0.0 standard drinks     If you drink alcohol do you typically  have >3 drinks per day or >7 drinks per week? No  Reviewed orders with patient.  Reviewed health maintenance and updated orders accordingly - Yes  Lab work is in process    Breast Cancer Screening:    Breast CA Risk Assessment (FHS-7) 2021   Do you have a family history of breast, colon, or ovarian cancer? Yes Yes No / Unknown No / Unknown     History of abnormal Pap smear: YES - updated in Problem List and Health Maintenance accordingly  PAP / HPV 10/30/2020 2018   PAP NIL NIL     Reviewed and updated as needed this visit by clinical staff  Tobacco  Allergies    Med Hx  Surg Hx  Fam Hx  Soc Hx        Reviewed and updated as needed this visit by Provider                 Past Surgical History:   Procedure Laterality Date     COLONOSCOPY       ESOPHAGOSCOPY, GASTROSCOPY, DUODENOSCOPY (EGD), COMBINED N/A 2017    Procedure: COMBINED ESOPHAGOSCOPY, GASTROSCOPY, DUODENOSCOPY (EGD), BIOPSY SINGLE OR MULTIPLE;  gastroscopy;  Surgeon: Hollis Prather MD;  Location:  GI     OB History    Para Term  AB Living   0 0 0 0 0 0   SAB TAB Ectopic Multiple Live Births   0 0 0 0 0       Review of Systems   Constitutional: Negative for chills and fever.   HENT: Negative for congestion, ear pain, hearing loss and sore throat.    Eyes: Negative for pain and visual disturbance.   Respiratory: Positive for cough. Negative for shortness of breath.    Cardiovascular: Negative for chest pain, palpitations and peripheral edema.   Gastrointestinal: Negative for abdominal pain, constipation, diarrhea, heartburn, hematochezia and nausea.   Breasts:  Negative for tenderness, breast mass and discharge.   Genitourinary: Negative for dysuria, frequency, genital sores, hematuria, pelvic pain, urgency, vaginal bleeding and vaginal discharge.   Musculoskeletal: Negative for arthralgias, joint swelling and myalgias.   Skin: Negative for rash.   Neurological: Negative for dizziness,  "weakness, headaches and paresthesias.   Psychiatric/Behavioral: Negative for mood changes. The patient is not nervous/anxious.      CONSTITUTIONAL: NEGATIVE for fever, chills, change in weight  INTEGUMENTARU/SKIN: NEGATIVE for worrisome rashes, moles or lesions  EYES: NEGATIVE for vision changes or irritation  ENT: NEGATIVE for ear, mouth and throat problems  RESP: NEGATIVE for significant cough or SOB  BREAST: NEGATIVE for masses, tenderness or discharge  CV: NEGATIVE for chest pain, palpitations or peripheral edema  GI: NEGATIVE for nausea, abdominal pain, heartburn, or change in bowel habits  : NEGATIVE for unusual urinary or vaginal symptoms. Periods are regular.  MUSCULOSKELETAL: NEGATIVE for significant arthralgias or myalgia  NEURO: NEGATIVE for weakness, dizziness or paresthesias  PSYCHIATRIC: NEGATIVE for changes in mood or affect     OBJECTIVE:   /81   Pulse 90   Temp 98.7  F (37.1  C) (Oral)   Ht 1.715 m (5' 7.5\")   Wt 71.7 kg (158 lb)   LMP 07/14/2021   SpO2 97%   BMI 24.38 kg/m    Physical Exam  GENERAL: healthy, alert and no distress  EYES: Eyes grossly normal to inspection, PERRL and conjunctivae and sclerae normal  HENT: ear canals and TM's normal, nose and mouth without ulcers or lesions  NECK: no adenopathy, no asymmetry, masses, or scars and thyroid normal to palpation  RESP: lungs clear to auscultation - no rales, rhonchi or wheezes  BREAST: normal without masses, tenderness or nipple discharge and no palpable axillary masses or adenopathy  CV: regular rate and rhythm, normal S1 S2, no S3 or S4, no murmur, click or rub, no peripheral edema and peripheral pulses strong  ABDOMEN: soft, nontender, no hepatosplenomegaly, no masses and bowel sounds normal  MS: no gross musculoskeletal defects noted, no edema  SKIN: no suspicious lesions or rashes  NEURO: Normal strength and tone, mentation intact and speech normal  PSYCH: mentation appears normal, affect normal/bright    Diagnostic Test " "Results:  Labs reviewed in Epic    ASSESSMENT/PLAN:       ICD-10-CM    1. Routine general medical examination at a health care facility  Z00.00 CBC with platelets and differential     Basic metabolic panel  (Ca, Cl, CO2, Creat, Gluc, K, Na, BUN)     TSH with free T4 reflex     Doing well with medications.   Desires to conceive in the future. We briefly discussed transitioning to a different selective serotonin reuptake inhibitor when she is trying to conceive.    Patient has been advised of split billing requirements and indicates understanding: Yes  COUNSELING:  Reviewed preventive health counseling, as reflected in patient instructions    Estimated body mass index is 24.38 kg/m  as calculated from the following:    Height as of this encounter: 1.715 m (5' 7.5\").    Weight as of this encounter: 71.7 kg (158 lb).        She reports that she has never smoked. She has never used smokeless tobacco.      Counseling Resources:  ATP IV Guidelines  Pooled Cohorts Equation Calculator  Breast Cancer Risk Calculator  BRCA-Related Cancer Risk Assessment: FHS-7 Tool  FRAX Risk Assessment  ICSI Preventive Guidelines  Dietary Guidelines for Americans, 2010  USDA's MyPlate  ASA Prophylaxis  Lung CA Screening    Sarah Castillo MD  [facility]  "

## 2021-07-15 ASSESSMENT — ENCOUNTER SYMPTOMS
FEVER: 0
BREAST MASS: 0
HEMATURIA: 0
SHORTNESS OF BREATH: 0
HEADACHES: 0
JOINT SWELLING: 0
SORE THROAT: 0
MYALGIAS: 0
WEAKNESS: 0
DIZZINESS: 0
DYSURIA: 0
COUGH: 1
NERVOUS/ANXIOUS: 0
CONSTIPATION: 0
HEARTBURN: 0
ARTHRALGIAS: 0
PARESTHESIAS: 0
ABDOMINAL PAIN: 0
CHILLS: 0
HEMATOCHEZIA: 0
PALPITATIONS: 0
NAUSEA: 0
EYE PAIN: 0
DIARRHEA: 0
FREQUENCY: 0

## 2021-07-16 ENCOUNTER — MYC MEDICAL ADVICE (OUTPATIENT)
Dept: FAMILY MEDICINE | Facility: CLINIC | Age: 26
End: 2021-07-16

## 2021-07-16 ENCOUNTER — OFFICE VISIT (OUTPATIENT)
Dept: FAMILY MEDICINE | Facility: CLINIC | Age: 26
End: 2021-07-16
Payer: COMMERCIAL

## 2021-07-16 VITALS
BODY MASS INDEX: 23.95 KG/M2 | TEMPERATURE: 98.7 F | HEIGHT: 68 IN | DIASTOLIC BLOOD PRESSURE: 81 MMHG | WEIGHT: 158 LBS | OXYGEN SATURATION: 97 % | SYSTOLIC BLOOD PRESSURE: 113 MMHG | HEART RATE: 90 BPM

## 2021-07-16 DIAGNOSIS — G43.909 MIGRAINE WITHOUT STATUS MIGRAINOSUS, NOT INTRACTABLE, UNSPECIFIED MIGRAINE TYPE: Primary | ICD-10-CM

## 2021-07-16 DIAGNOSIS — Z00.00 ROUTINE GENERAL MEDICAL EXAMINATION AT A HEALTH CARE FACILITY: Primary | ICD-10-CM

## 2021-07-16 LAB
BASOPHILS # BLD AUTO: 0 10E3/UL (ref 0–0.2)
BASOPHILS NFR BLD AUTO: 0 %
EOSINOPHIL # BLD AUTO: 0.1 10E3/UL (ref 0–0.7)
EOSINOPHIL NFR BLD AUTO: 2 %
ERYTHROCYTE [DISTWIDTH] IN BLOOD BY AUTOMATED COUNT: 12.7 % (ref 10–15)
HCT VFR BLD AUTO: 43.1 % (ref 35–47)
HGB BLD-MCNC: 14.4 G/DL (ref 11.7–15.7)
LYMPHOCYTES # BLD AUTO: 2 10E3/UL (ref 0.8–5.3)
LYMPHOCYTES NFR BLD AUTO: 38 %
MCH RBC QN AUTO: 28.1 PG (ref 26.5–33)
MCHC RBC AUTO-ENTMCNC: 33.4 G/DL (ref 31.5–36.5)
MCV RBC AUTO: 84 FL (ref 78–100)
MONOCYTES # BLD AUTO: 0.3 10E3/UL (ref 0–1.3)
MONOCYTES NFR BLD AUTO: 6 %
NEUTROPHILS # BLD AUTO: 2.8 10E3/UL (ref 1.6–8.3)
NEUTROPHILS NFR BLD AUTO: 55 %
PLATELET # BLD AUTO: 248 10E3/UL (ref 150–450)
RBC # BLD AUTO: 5.13 10E6/UL (ref 3.8–5.2)
WBC # BLD AUTO: 5.2 10E3/UL (ref 4–11)

## 2021-07-16 PROCEDURE — 80048 BASIC METABOLIC PNL TOTAL CA: CPT | Performed by: FAMILY MEDICINE

## 2021-07-16 PROCEDURE — 36415 COLL VENOUS BLD VENIPUNCTURE: CPT | Performed by: FAMILY MEDICINE

## 2021-07-16 PROCEDURE — 84443 ASSAY THYROID STIM HORMONE: CPT | Performed by: FAMILY MEDICINE

## 2021-07-16 PROCEDURE — 85025 COMPLETE CBC W/AUTO DIFF WBC: CPT | Performed by: FAMILY MEDICINE

## 2021-07-16 PROCEDURE — 99395 PREV VISIT EST AGE 18-39: CPT | Performed by: FAMILY MEDICINE

## 2021-07-16 ASSESSMENT — ANXIETY QUESTIONNAIRES
6. BECOMING EASILY ANNOYED OR IRRITABLE: SEVERAL DAYS
1. FEELING NERVOUS, ANXIOUS, OR ON EDGE: SEVERAL DAYS
GAD7 TOTAL SCORE: 6
5. BEING SO RESTLESS THAT IT IS HARD TO SIT STILL: NOT AT ALL
3. WORRYING TOO MUCH ABOUT DIFFERENT THINGS: SEVERAL DAYS
IF YOU CHECKED OFF ANY PROBLEMS ON THIS QUESTIONNAIRE, HOW DIFFICULT HAVE THESE PROBLEMS MADE IT FOR YOU TO DO YOUR WORK, TAKE CARE OF THINGS AT HOME, OR GET ALONG WITH OTHER PEOPLE: SOMEWHAT DIFFICULT
7. FEELING AFRAID AS IF SOMETHING AWFUL MIGHT HAPPEN: SEVERAL DAYS
2. NOT BEING ABLE TO STOP OR CONTROL WORRYING: SEVERAL DAYS

## 2021-07-16 ASSESSMENT — PATIENT HEALTH QUESTIONNAIRE - PHQ9
SUM OF ALL RESPONSES TO PHQ QUESTIONS 1-9: 0
5. POOR APPETITE OR OVEREATING: SEVERAL DAYS

## 2021-07-16 ASSESSMENT — MIFFLIN-ST. JEOR: SCORE: 1487.24

## 2021-07-17 ENCOUNTER — NURSE TRIAGE (OUTPATIENT)
Dept: NURSING | Facility: CLINIC | Age: 26
End: 2021-07-17

## 2021-07-17 LAB
ANION GAP SERPL CALCULATED.3IONS-SCNC: 4 MMOL/L (ref 3–14)
BUN SERPL-MCNC: 11 MG/DL (ref 7–30)
CALCIUM SERPL-MCNC: 8.7 MG/DL (ref 8.5–10.1)
CHLORIDE BLD-SCNC: 108 MMOL/L (ref 94–109)
CO2 SERPL-SCNC: 25 MMOL/L (ref 20–32)
CREAT SERPL-MCNC: 0.77 MG/DL (ref 0.52–1.04)
GFR SERPL CREATININE-BSD FRML MDRD: >90 ML/MIN/1.73M2
GLUCOSE BLD-MCNC: 80 MG/DL (ref 70–99)
POTASSIUM BLD-SCNC: 4.3 MMOL/L (ref 3.4–5.3)
SODIUM SERPL-SCNC: 137 MMOL/L (ref 133–144)
TSH SERPL DL<=0.005 MIU/L-ACNC: 1.32 MU/L (ref 0.4–4)

## 2021-07-17 ASSESSMENT — ANXIETY QUESTIONNAIRES: GAD7 TOTAL SCORE: 6

## 2021-07-17 NOTE — TELEPHONE ENCOUNTER
Joaquina calling about birth control script needing refill. See note from provider.      7/6/2021  Desires to conceive in the future. We briefly discussed transitioning to a different selective serotonin reuptake inhibitor when she is trying to conceive.    She plans to check with provider.

## 2021-07-19 NOTE — TELEPHONE ENCOUNTER
Routing refill request to provider for review/approval because:  Last Rx from outside provider  Madison HOSKINS RN

## 2021-07-20 RX ORDER — ZOLMITRIPTAN 5 MG/1
5 TABLET, FILM COATED ORAL
Qty: 12 TABLET | Refills: 0 | Status: SHIPPED | OUTPATIENT
Start: 2021-07-20 | End: 2021-08-24

## 2021-07-26 ENCOUNTER — MYC MEDICAL ADVICE (OUTPATIENT)
Dept: NEUROLOGY | Facility: CLINIC | Age: 26
End: 2021-07-26

## 2021-08-17 NOTE — TELEPHONE ENCOUNTER
Received [employer] messages about worsening headaches. Called and left a VM.     I called Joaquina in response to her [employer] messages. When she calls back please add her to my schedule. Thank you

## 2021-08-24 ENCOUNTER — VIRTUAL VISIT (OUTPATIENT)
Dept: NEUROLOGY | Facility: CLINIC | Age: 26
End: 2021-08-24
Payer: COMMERCIAL

## 2021-08-24 DIAGNOSIS — G43.719 INTRACTABLE CHRONIC MIGRAINE WITHOUT AURA AND WITHOUT STATUS MIGRAINOSUS: Primary | ICD-10-CM

## 2021-08-24 DIAGNOSIS — G43.809 OTHER MIGRAINE WITHOUT STATUS MIGRAINOSUS, NOT INTRACTABLE: ICD-10-CM

## 2021-08-24 PROCEDURE — 99214 OFFICE O/P EST MOD 30 MIN: CPT | Mod: GT | Performed by: NURSE PRACTITIONER

## 2021-08-24 RX ORDER — TOPIRAMATE 50 MG/1
TABLET, FILM COATED ORAL
Qty: 60 TABLET | Refills: 3 | Status: SHIPPED | OUTPATIENT
Start: 2021-08-24 | End: 2021-08-24

## 2021-08-24 RX ORDER — NARATRIPTAN 2.5 MG/1
2.5 TABLET ORAL
Qty: 12 TABLET | Refills: 6 | Status: SHIPPED | OUTPATIENT
Start: 2021-08-24 | End: 2022-08-09

## 2021-08-24 RX ORDER — VERAPAMIL HYDROCHLORIDE 40 MG/1
TABLET ORAL
Qty: 60 TABLET | Refills: 3 | Status: SHIPPED | OUTPATIENT
Start: 2021-08-24 | End: 2021-09-16

## 2021-08-24 NOTE — LETTER
8/24/2021       RE: Ary Lutz  [address]  [place] [address]     Dear Colleague,    Thank you for referring your patient, Ary Lutz, to the [facility] NEUROLOGY CLINIC [place] [facility]. Please see a copy of my visit note below.    Video Start Time: MIGRAINE DISABILITY ASSESSMENT (MIDAS)    On how many days in the last 3 months did you miss work or school because of your headaches?  1    How many days in the last 3 months was your productivity at work or school reduced by half or more because of your headaches? (Do not include days you counted in question 1 where you missed work or school.)  2    On how many days in the last 3 months did you not do household work (such as housework, home repairs and maintenance, shopping, caring for children and relatives) because of your headaches?  1    How many days in the last 3 months was your productivity in household work reduced by half or more because of your headaches? (Do not include days you counted in question 3 where you did not do household work).  4    On how many days in the last 3 months did you miss family, social, or lesiure activities because of your headaches?  0    MIDAS Total Score: 8    On how many days in the last 3 months did you have a headache? (If a headache lasted more than 1 day, count each day.)   6    On a scale of 0 - 10, on average how painful were these headaches (where 0 = no pain at all, and 10 = pain as bad as it can be.)  7    Headache follow up virtual visit note  Has been to ED in April of 2021 for acute migraine headache treatment. Note reviewed  Patient is on Prozac for anxiety 40 mg a day  Has been taking topiramate 50 mg /day and a little bit of memory but otherwise tolerated, tried up to 100 mg and caused interferens with birth control and break thru bleeding  Severe headaches before/during or after period and takes zolmitriptan -but  headache reoccures    Would avoid TCA or selective serotonin reuptake inhibitor or SRNI -patient is on prozac and mood ist stable while on prozac  Would avoid BBB due to history of depression     Headaches 6-8 days per month and duration 3 days.     Plan:  Headache log  Wean off topiramate but can wait until stable on verapamil  Verapamil trial 40 mg daily for one week than 40 mg twice daily   Stay hydrated to prevent renal stones and use a reliable birth control.   Rescue treatment -naratriptan as needed rescue and limit use to no more than 9 days per month. Side effects-dizziness, drowsiness, fatigue  Follow up 4-6 weeks or sooner if needed     Allergies and current prescription medications reviewed    Patient appears alert and no in apparent acute distress,  mentation appears normal, judgement and insight intact, normal speech.    A/P as discussed above    I discussed all my recommendations with Ary Lutz who verbalizes understanding and comfortable with the plan.  All of patient's questions were answered from the best of my knowledge.  Patient is in agreement with the plan.      29 minutes spent on the date of the encounter doing video access, chart  review, exam, results review,  meds review, treatment plan, documentation and further activities as noted above    ROBE Urbina, CNP [facility]  Headache certified  [facility]    Again, thank you for allowing me to participate in the care of your patient.      Sincerely,    ROBE Vargas CNP

## 2021-08-24 NOTE — PROGRESS NOTES
Joaquina is a 26 year old who is being evaluated via a billable video visit.      How would you like to obtain your AVS? MyChart  If the video visit is dropped, the invitation should be resent by: Text to cell phone: [phone]  Will anyone else be joining your video visit? No      Video Start Time: MIGRAINE DISABILITY ASSESSMENT (MIDAS)    On how many days in the last 3 months did you miss work or school because of your headaches?  1    How many days in the last 3 months was your productivity at work or school reduced by half or more because of your headaches? (Do not include days you counted in question 1 where you missed work or school.)  2    On how many days in the last 3 months did you not do household work (such as housework, home repairs and maintenance, shopping, caring for children and relatives) because of your headaches?  1    How many days in the last 3 months was your productivity in household work reduced by half or more because of your headaches? (Do not include days you counted in question 3 where you did not do household work).  4    On how many days in the last 3 months did you miss family, social, or lesiure activities because of your headaches?  0    MIDAS Total Score: 8    On how many days in the last 3 months did you have a headache? (If a headache lasted more than 1 day, count each day.)   6    On a scale of 0 - 10, on average how painful were these headaches (where 0 = no pain at all, and 10 = pain as bad as it can be.)  7  Video-Visit Details    Type of service:  Video Visit  Video start time 1:59 PM    Video End Time:2:27 PM    Originating Location (pt. Location): Home    Distant Location (provider location):  [facility] NEUROLOGY CLINIC [place]     Platform used for Video Visit: [employer]   Headache follow up virtual visit note  Has been to ED in April of 2021 for acute migraine headache treatment. Note reviewed  Patient is on Prozac for anxiety 40 mg a day  Has been taking  topiramate 50 mg /day and a little bit of memory but otherwise tolerated, tried up to 100 mg and caused interferens with birth control and break thru bleeding  Severe headaches before/during or after period and takes zolmitriptan -but headache reoccures    Would avoid TCA or selective serotonin reuptake inhibitor or SRNI -patient is on prozac and mood ist stable while on prozac  Would avoid BBB due to history of depression     Headaches 6-8 days per month and duration 3 days.     Plan:  Headache log  Wean off topiramate but can wait until stable on verapamil  Verapamil trial 40 mg daily for one week than 40 mg twice daily   Stay hydrated to prevent renal stones and use a reliable birth control.   Rescue treatment -naratriptan as needed rescue and limit use to no more than 9 days per month. Side effects-dizziness, drowsiness, fatigue  Follow up 4-6 weeks or sooner if needed     Allergies and current prescription medications reviewed    Patient appears alert and no in apparent acute distress,  mentation appears normal, judgement and insight intact, normal speech.    A/P as discussed above    I discussed all my recommendations with Ary Lutz who verbalizes understanding and comfortable with the plan.  All of patient's questions were answered from the best of my knowledge.  Patient is in agreement with the plan.      29 minutes spent on the date of the encounter doing video access, chart  review, exam, results review,  meds review, treatment plan, documentation and further activities as noted above    ROBE Urbina, CNP [facility]  Headache certified  [facility]

## 2021-08-24 NOTE — PATIENT INSTRUCTIONS
Plan:  Headache log  Wean off topiramate but can wait until stable on verapamil  Verapamil trial 40 mg daily for one week than 40 mg twice daily   Stay hydrated to prevent renal stones and use a reliable birth control.   Rescue treatment -naratriptan as needed rescue and limit use to no more than 9 days per month. Side effects-dizziness, drowsiness, fatigue  Follow up 4-6 weeks or sooner if needed       Patient Education     Verapamil HCL Oral Tablet 40 mg  Uses  This medicine is used for the following purposes:    angina    headaches    high blood pressure    irregular heart beat  Instructions  This medicine may be taken with or without food.  It is very important that you take the medicine at about the same time every day. It will work best if you do this.  Keep the medicine at room temperature. Avoid heat and direct light.  It is important that you keep taking each dose of this medicine on time even if you are feeling well.  If you forget to take a dose on time, take it as soon as you remember. If it is almost time for the next dose, do not take the missed dose. Return to your normal dosing schedule. Do not take 2 doses of this medicine at one time.  Please tell your doctor and pharmacist about all the medicines you take. Include both prescription and over-the-counter medicines. Also tell them about any vitamins, herbal medicines, or anything else you take for your health.  Do not suddenly stop taking this medicine. Check with your doctor before stopping.  Cautions  Tell your doctor and pharmacist if you ever had an allergic reaction to a medicine. Symptoms of an allergic reaction can include trouble breathing, skin rash, itching, swelling, or severe dizziness.  Some patients with weak hearts may have worsening of symptoms. If you notice difficulty breathing, weight gain, or swelling of your legs or ankles, let your doctor know right away.  Do not use the medication any more than instructed.  Your ability to stay  alert or to react quickly may be impaired by this medicine. Do not drive or operate machinery until you know how this medicine will affect you.  Please check with your doctor before drinking alcohol while on this medicine.  Tell the doctor or pharmacist if you are pregnant, planning to be pregnant, or breastfeeding.  Ask your pharmacist if this medicine can interact with any of your other medicines. Be sure to tell them about all the medicines you take.  Do not start or stop any other medicines without first speaking to your doctor or pharmacist.  Do not share this medicine with anyone who has not been prescribed this medicine.  Side Effects  The following is a list of some common side effects from this medicine. Please speak with your doctor about what you should do if you experience these or other side effects.    constipation    dizziness    lack of energy and tiredness    nausea    stomach upset or abdominal pain  Call your doctor or get medical help right away if you notice any of these more serious side effects:    slow heartbeat    low blood pressure    shortness of breath  A few people may have an allergic reactions to this medicine. Symptoms can include difficulty breathing, skin rash, itching, swelling, or severe dizziness. If you notice any of these symptoms, seek medical help quickly.  Extra  Please speak with your doctor, nurse, or pharmacist if you have any questions about this medicine.  [email or web address]  IMPORTANT NOTE: This document tells you briefly how to take your medicine, but it does not tell you all there is to know about it.Your doctor or pharmacist may give you other documents about your medicine. Please talk to them if you have any questions.Always follow their advice. There is a more complete description of this medicine available in English.Scan this code on your smartphone or tablet or use the web address below. You can also ask your pharmacist for a  printout. If you have any questions, please ask your pharmacist.     2021 [employer].

## 2021-09-02 ENCOUNTER — TELEPHONE (OUTPATIENT)
Dept: NEUROLOGY | Facility: CLINIC | Age: 26
End: 2021-09-02

## 2021-09-02 DIAGNOSIS — G43.019 INTRACTABLE MIGRAINE WITHOUT AURA AND WITHOUT STATUS MIGRAINOSUS: Primary | ICD-10-CM

## 2021-09-02 RX ORDER — UBROGEPANT 50 MG/1
50-100 TABLET ORAL
Qty: 20 TABLET | Refills: 9 | Status: SHIPPED | OUTPATIENT
Start: 2021-09-02 | End: 2021-12-23

## 2021-09-02 NOTE — TELEPHONE ENCOUNTER
Prior Authorization Retail Medication Request    Medication/Dose: ubrogepant (UBRELVY) 50 MG tablet  ICD code (if different than what is on RX):  G43.019  Previously Tried and Failed:  naratriptan, sumatriptan and zolmitriptans  Rationale:  migraine    Insurance Name:  [employer]  Insurance ID: [id number]        Pharmacy Information (if different than what is on RX)  Name:    Phone:

## 2021-09-03 NOTE — TELEPHONE ENCOUNTER
Central Prior Authorization Team   Phone: [phone]      [place] Initiation    Medication: ubrogepant (UBRELVY) 50 MG tablet  Insurance Company: [employer] - Phone [phone] Fax [phone]  Pharm[facility]illing the Rx: [id number] [place], [place] - [address]  Filling Pharmacy Phone: [phone]  Filling Pharmacy Fax:    Start Date: 9/3/2021

## 2021-09-07 ENCOUNTER — TELEPHONE (OUTPATIENT)
Dept: NEUROLOGY | Facility: CLINIC | Age: 26
End: 2021-09-07

## 2021-09-07 NOTE — TELEPHONE ENCOUNTER
PRIOR AUTHORIZATION DENIED    Medication: ubrogepant (UBRELVY) 50 MG tablet-DENIED    Denial Date: 9/7/2021    Denial Rational:           Appeal Information:

## 2021-09-07 NOTE — TELEPHONE ENCOUNTER
Central Prior Authorization Team   Phone: [phone]    [place] Initiation    Medication: Nurtec 75 mg  Insurance Company: [employer] - Phone [phone] Fax [phone]  Pharm[facility]illing the Rx[id number] [place], [place] - [address]  Filling Pharmacy Phone: [phone]  Filling Pharmacy Fax: [phone]  Start Date: 9/7/2021

## 2021-09-07 NOTE — TELEPHONE ENCOUNTER
Prior Authorization Retail Medication Request    Medication/Dose: Nurtec 75 mg  ICD code (if different than what is on RX): G43.019  Previously Tried and Failed:  naratriptan, sumatriptan and zolmitriptans  Rationale:  migraine     Insurance Name:  [employer]  Insurance ID: [id number]          Pharmacy Information (if different than what is on RX)  Name:    Phone:

## 2021-09-09 NOTE — TELEPHONE ENCOUNTER
Prior Authorization Approval    Authorization Effective Date: 8/9/2021  Authorization Expiration Date: 3/7/2022  Medication: Nurtec 75 mg-[place] APPROVED   Approved Dose/Quantity: QUANTITY LIMIT OF 8 TABLETS PER 30 DAYS   Reference #:     Insurance Company: [employer] - Phone [phone] Fax [phone]  Expected CoPay:       CoPay Card Available:      Foundation Assistance Needed:    Which Pharmacy is filling the prescription (Not needed for infusion/clinic administered): [id number] - [place], [place] - [address] [place] [address]  Pharmacy Notified: Yes- **Instructed pharmacy to notify patient when script is ready to /ship.**   Patient Notified: Yes

## 2021-09-15 DIAGNOSIS — G43.719 INTRACTABLE CHRONIC MIGRAINE WITHOUT AURA AND WITHOUT STATUS MIGRAINOSUS: ICD-10-CM

## 2021-09-15 NOTE — TELEPHONE ENCOUNTER
Rx Authorization:  Requested Medication/ Dose: Verapamil 40MG tabs  Date last refill ordered: 8/24/21  Quantity ordered: 60 tabs  # refills: 3  Date of last clinic visit with ordering provider: 8/24/21  Date of next clinic visit with ordering provider: F/U 4-6 weeks  All pertinent protocol data (lab date/result):   Include pertinent information from patients message:

## 2021-09-16 RX ORDER — VERAPAMIL HYDROCHLORIDE 40 MG/1
TABLET ORAL
Qty: 60 TABLET | Refills: 3 | Status: SHIPPED | OUTPATIENT
Start: 2021-09-16 | End: 2021-12-23

## 2021-10-18 ENCOUNTER — MYC MEDICAL ADVICE (OUTPATIENT)
Dept: FAMILY MEDICINE | Facility: CLINIC | Age: 26
End: 2021-10-18

## 2021-10-26 ENCOUNTER — VIRTUAL VISIT (OUTPATIENT)
Dept: NEUROLOGY | Facility: CLINIC | Age: 26
End: 2021-10-26
Payer: COMMERCIAL

## 2021-10-26 DIAGNOSIS — G43.719 INTRACTABLE CHRONIC MIGRAINE WITHOUT AURA AND WITHOUT STATUS MIGRAINOSUS: Primary | ICD-10-CM

## 2021-10-26 PROCEDURE — 99214 OFFICE O/P EST MOD 30 MIN: CPT | Mod: GT | Performed by: NURSE PRACTITIONER

## 2021-10-26 RX ORDER — HYDROCODONE BITARTRATE AND ACETAMINOPHEN 5; 325 MG/1; MG/1
1-2 TABLET ORAL PRN
COMMUNITY
Start: 2021-10-25 | End: 2022-01-19

## 2021-10-26 RX ORDER — ONDANSETRON 4 MG/1
4 TABLET, ORALLY DISINTEGRATING ORAL PRN
COMMUNITY
Start: 2021-10-25 | End: 2022-07-05

## 2021-10-26 NOTE — LETTER
10/26/2021       RE: Ary Lutz  [address]  [place] [address]     Dear Colleague,    Thank you for referring your patient, Ary Lutz, to the [facility] [place] at [facility]. Please see a copy of my visit note below.    Joaquina is a 26 year old who is being evaluated via a billable video visit.      How would you like to obtain your AVS? MyChart  If the video visit is dropped, the invitation should be resent by: Text to cell phone: [phone]   Will anyone else be joining your video visit? No      Video Start Time: MIGRAINE DISABILITY ASSESSMENT (MIDAS)    On how many days in the last 3 months did you miss work or school because of your headaches?  0    How many days in the last 3 months was your productivity at work or school reduced by half or more because of your headaches? (Do not include days you counted in question 1 where you missed work or school.)  6    On how many days in the last 3 months did you not do household work (such as housework, home repairs and maintenance, shopping, caring for children and relatives) because of your headaches?  2    How many days in the last 3 months was your productivity in household work reduced by half or more because of your headaches? (Do not include days you counted in question 3 where you did not do household work).  2    On how many days in the last 3 months did you miss family, social, or lesiure activities because of your headaches?  2    MIDAS Total Score:    On how many days in the last 3 months did you have a headache? (If a headache lasted more than 1 day, count each day.)   27    On a scale of 0 - 10, on average how painful were these headaches (where 0 = no pain at all, and 10 = pain as bad as it can be.)  7  Video-Visit Details    Type of service:  Video Visit    Video Start Time: 8:35 AM    Video End Time:9:02 AM    Originating Location (pt.  Location): Home    Distant Location (provider location):  [facility]UROLOG[facility] [place]     Platform used for Video Visit: [facility]     Headache Clinic follow up note:  Headache updates:  Headache frequency In average 8-9/month and duration usually 2-3 days. Patient went to  and lasted for 5 days and naratriptan helped a little but headaches were recurring  Did not try rimegepant or ubrelvy-was not sure if either can be used with naratriptan.   Utilizing rescue treatment.   Hydroxyzine and prozac-anxiety/depression  Verapamil 40 mg twice daily but does not help and no difference    Plan:  Rescue treatment -Nurtec or naratriptan as needed. May take Nurtec the same day with naratriptan if naratriptan were not effective. Take Nurtec for about 3-5 days.   Ondansetron as needed   Would avoid use of opioids.   Headache log  Wean off verapamil by taking 40 mg less for one week than stop it and see if any difference  If headaches were increased in frequency -we'll proceed with Botox.   Follow up in 3 months or sooner if needed    Feeling better today. Patient is alert and no in apparent acute distress,  mentation appears normal, judgement and insight intact, normal speech.    I discussed all my recommendations with Ary Lutz who verbalizes understanding and comfortable with the plan.  All of patient's questions were answered from the best of my knowledge.  Patient is in agreement with the plan.     30 minutes spent on the date of the encounter doing video access, chart  review, exam, results review,  meds review, treatment plan, documentation and further activities as noted above    ROBE Urbina, CNP [facility]  Headache certified  [facility]

## 2021-10-26 NOTE — PATIENT INSTRUCTIONS
Plan:  Rescue treatment -Nurtec or naratriptan as needed. May take Nurtec the same day with naratriptan if naratriptan were not effective. Take Nurtec for about 3-5 days.   Ondansetron as needed   Would avoid use of opioids.   Headache log  Wean off verapamil by taking 40 mg less for one week than stop it and see if any difference  If headaches were increased in frequency -we'll proceed with Botox.   Follow up in 3 months or sooner if needed

## 2021-10-26 NOTE — PROGRESS NOTES
Joaquina is a 26 year old who is being evaluated via a billable video visit.      How would you like to obtain your AVS? MyChart  If the video visit is dropped, the invitation should be resent by: Text to cell phone: [phone]   Will anyone else be joining your video visit? No      Video Start Time: MIGRAINE DISABILITY ASSESSMENT (MIDAS)    On how many days in the last 3 months did you miss work or school because of your headaches?  0    How many days in the last 3 months was your productivity at work or school reduced by half or more because of your headaches? (Do not include days you counted in question 1 where you missed work or school.)  6    On how many days in the last 3 months did you not do household work (such as housework, home repairs and maintenance, shopping, caring for children and relatives) because of your headaches?  2    How many days in the last 3 months was your productivity in household work reduced by half or more because of your headaches? (Do not include days you counted in question 3 where you did not do household work).  2    On how many days in the last 3 months did you miss family, social, or lesiure activities because of your headaches?  2    MIDAS Total Score:    On how many days in the last 3 months did you have a headache? (If a headache lasted more than 1 day, count each day.)   27    On a scale of 0 - 10, on average how painful were these headaches (where 0 = no pain at all, and 10 = pain as bad as it can be.)  7  Video-Visit Details    Type of service:  Video Visit    Video Start Time: 8:35 AM    Video End Time:9:02 AM    Originating Location (pt. Location): Home    Distant Location (provider location):  [facility] [place]     Platform used for Video Visit: [facility]     Headache Clinic follow up note:  Headache updates:  Headache frequency In average 8-9/month and duration usually 2-3 days. Patient went to  and lasted for 5 days and naratriptan helped a  little but headaches were recurring  Did not try rimegepant or ubrelvy-was not sure if either can be used with naratriptan.   Utilizing rescue treatment.   Hydroxyzine and prozac-anxiety/depression  Verapamil 40 mg twice daily but does not help and no difference    Plan:  Rescue treatment -Nurtec or naratriptan as needed. May take Nurtec the same day with naratriptan if naratriptan were not effective. Take Nurtec for about 3-5 days.   Ondansetron as needed   Would avoid use of opioids.   Headache log  Wean off verapamil by taking 40 mg less for one week than stop it and see if any difference  If headaches were increased in frequency -we'll proceed with Botox.   Follow up in 3 months or sooner if needed    Feeling better today. Patient is alert and no in apparent acute distress,  mentation appears normal, judgement and insight intact, normal speech.    I discussed all my recommendations with Ary Lutz who verbalizes understanding and comfortable with the plan.  All of patient's questions were answered from the best of my knowledge.  Patient is in agreement with the plan.     30 minutes spent on the date of the encounter doing video access, chart  review, exam, results review,  meds review, treatment plan, documentation and further activities as noted above    ROBE Urbina, CNP [facility]  Headache certified  [facility]

## 2021-10-30 ENCOUNTER — MYC MEDICAL ADVICE (OUTPATIENT)
Dept: FAMILY MEDICINE | Facility: CLINIC | Age: 26
End: 2021-10-30

## 2021-10-30 DIAGNOSIS — F41.0 PANIC ATTACK: ICD-10-CM

## 2021-11-02 DIAGNOSIS — F41.0 PANIC ATTACK: ICD-10-CM

## 2021-11-02 RX ORDER — HYDROXYZINE HYDROCHLORIDE 25 MG/1
50 TABLET, FILM COATED ORAL EVERY 8 HOURS PRN
Qty: 60 TABLET | Refills: 3 | Status: CANCELLED | OUTPATIENT
Start: 2021-11-02

## 2021-11-03 RX ORDER — HYDROXYZINE HYDROCHLORIDE 25 MG/1
50 TABLET, FILM COATED ORAL EVERY 8 HOURS PRN
Qty: 60 TABLET | Refills: 3 | Status: SHIPPED | OUTPATIENT
Start: 2021-11-03 | End: 2022-07-20

## 2021-11-03 RX ORDER — HYDROXYZINE HYDROCHLORIDE 25 MG/1
50 TABLET, FILM COATED ORAL EVERY 8 HOURS PRN
Qty: 60 TABLET | Refills: 3 | OUTPATIENT
Start: 2021-11-03

## 2021-11-03 NOTE — TELEPHONE ENCOUNTER
Routing refill request to provider for review/approval because:  Last Rx from outside provider   See Suhashart from pt  Madison HOSKINS RN

## 2021-11-08 ENCOUNTER — MYC MEDICAL ADVICE (OUTPATIENT)
Dept: FAMILY MEDICINE | Facility: CLINIC | Age: 26
End: 2021-11-08
Payer: COMMERCIAL

## 2021-11-08 DIAGNOSIS — Z31.41 FERTILITY TESTING: Primary | ICD-10-CM

## 2021-11-08 NOTE — LETTER
2021      Ary Lutz  [address]  [place] [address]        Dear ,    Here is an explanation of your fertility labs.       Resulted Orders   Follicle stimulating hormone   Result Value Ref Range    FSH 0.3 IU/L      Comment:      FEMALE:   Premenopausal, 18 and older:   Follicular: 2.5-10.2 U/L  Mid-cycle: 3.4-33.4 U/L  Luteal: 1.5-9.1 U/L  Postmenopausal: 23.0-116.3 U/L     Follicle Stimulating Hormone (FSH)  FSH is often used as a gauge of ovarian reserve but mostly quality of the eggs. Older patients close to menopause will have a high number which indicates that the quality of the eggs is very low.     General results indicate the followin: considered excellent  6-9: good  10-13: diminished reserve    Your level was 0.3 which is secondary to your current use of an oral contraceptive.      Estradiol   Result Value Ref Range    Estradiol <11 pg/mL      Comment:      Estradiol reference ranges for pre-menopausal females:  Follicular:  pg/mL  Mid-cycle:  pg/mL  Luteal:  pg/mL  Estradiol (E2)  Time to Test: Day 3 - Normal Range:  pg/ml  Levels on the lower end tend to be better  Your level was <11 which is secondary to your current use of an oral contraceptive.    Prolactin   Result Value Ref Range    Prolactin 18 3 - 27 ug/L      Comment:      Female (non pregnant):  1-9 years: 2-18 ng/mL  10 years and older: 3-27 ng/mL    Prolactin  Time to Test: Day 3 - Normal Range: <27 ng/ml  Increased prolactin levels can interfere with proper ovulation. Your level is normal and does not need to be repeated.    DHEA sulfate   Result Value Ref Range    DHEA Sulfate   182 35 - 430 ug/dL   Time to Test: Day 3 - Normal Range:  ug/dl. An elevated DHEAS is often seen in PCOS. Your level is normal.        Testosterone total   Result Value Ref Range    Testosterone Total 35 8 - 60 ng/dL   Testosterone is an adrenal gland and ovarian hormone. A level  >50 is considered to be somewhat elevated and can indicate PCOS. Your level is normal.    Progesterone   Result Value Ref Range    Progesterone 0.5 ng/mL   Time of Test: Day 3 - Normal Range: <1.5 ng/ml  Progesterone is often called the follicular phase level. An elevated level may indicate a lower pregnancy rate. It is also called the progestation hormone. It helps the embryo implant into the wall of the uterus better.   Your level is low which is likely secondary to incorrect timing of the test.       Lutropin   Result Value Ref Range    Lutropin <0.2 IU/L      Comment:      FEMALE:  Premenopausal, 18 and older:   Follicular Phase: 1.9-12.5 IU/L  Mid-cycle Peak: 8.7-76.3 IU/L  Luteal Phase: 5-16.9  IU/L  Postmenopausal: 15.9-54.0 IU/L    Time to Test: Day 3 - Normal Range: <12.5 mIU/ml  A normal LH level is similar to FSH.  An LH that is higher than FSH is one indication of PCOS.     Your LH level is very low which is likely secondary to your current use of an oral contraceptive.          If you have any questions or concerns, please call the clinic at the number listed above.       Sincerely,      Sarah Castillo MD

## 2021-11-19 NOTE — PROGRESS NOTES
Assessment & Plan     Diarrhea, unspecified type  Assessment and plan:   The patient presents to clinic for evaluation of abdominal cramping/spasms, intermittent episodes of diarrhea. The patient denies any  weight changes. Symptoms have been ongoing for more than one month. Family history of colon cancer but denies any family hx of ulcerative colitis or crohn's disease. A few tests were ordered.  CT abdomen pelvis was ordered. If all tests are nagative, I would recommend a trial of the following medications.   Reviewed the criteria for diagnosing irritable bowel syndrome is primarily a diagnosis of exclusion  For intermittent abdominal pain, patient was advised to use: Antispasmolytics: - Bentyl (dicyclomine) and Levsin (hyoscyamine) both of them were shown to relieve IBS symptoms and abdominal pains.   For diarrhea, patient was advised to start - Loperamide or Imodium multi symptoms relief (Loperamide/simethacone)  For generalized abdominal pain, patient may also use- Pepto-bismol (bismuth subsalicylate) or kaopectate  For flatulence, patient was advised to start : Simethicone (Gas X) and Imodium multi symptoms relief (Loperamide/simethacone)  Plan:  - CT Abdomen Pelvis w Contrast; Future  - Enteric Bacteria and Virus Panel by LUPIS Stool; Future  - Fecal Lactoferrin; Future  - TSH    Return in about 2 weeks (around 12/6/2021) for Follow-up visit.    Sarah Castillo MD  [facility]    Carlitos Kunz is a 26 year old who presents for the following health issues     History of Present Illness       She eats 2-3 servings of fruits and vegetables daily.She consumes 1 sweetened beverage(s) daily.She exercises with enough effort to increase her heart rate 30 to 60 minutes per day.  She exercises with enough effort to increase her heart rate 4 days per week.   She is taking medications regularly.       Diarrhea  Onset/Duration: For about a Month  Description:       Consistency of stool: watery,  "runny, loose       Blood in stool: YES- streaks of blood when she wipes.        Number of loose stools past 24 hours: 5-6  Progression of Symptoms: same  Accompanying signs and symptoms:       Fever: no       Nausea/Vomiting: YES       Abdominal pain: YES       Weight loss: no       Episodes of constipation: no  History   Ill contacts: no  Recent use of antibiotics: no  Recent travels: no  Recent medication-new or changes(Rx or OTC): no  Precipitating or alleviating factors: None  Therapies tried and outcome: PeptoBismol    Patient has a history of IBS and anxiety. Current episode of diarrhea started one month ago. It does not co-relate to food intake.   She has been noticing some blood in her stools which she attributed to frequent bowel movements.     The patient had an endoscopy and colonoscopy in  2017.  Noted to have H pylori otherwise her colonoscopy was negative.      Patient's father and paternal grandmother were diagnosed with colon cancer.       Review of Systems   Constitutional, HEENT, cardiovascular, pulmonary, gi and gu systems are negative, except as otherwise noted.      Objective    /78   Pulse 89   Temp 98.3  F (36.8  C)   Ht 1.715 m (5' 7.5\")   Wt 75.7 kg (166 lb 14.4 oz)   LMP 10/22/2021   SpO2 98%   BMI 25.75 kg/m    Body mass index is 25.75 kg/m .  Physical Exam   GENERAL: healthy, alert and no distress  RESP: lungs clear to auscultation - no rales, rhonchi or wheezes  CV: regular rate and rhythm, normal S1 S2, no S3 or S4, no murmur, click or rub, no peripheral edema and peripheral pulses strong  ABDOMEN: soft, nontender, no hepatosplenomegaly, no masses and bowel sounds normal    No results found for any visits on 11/22/21.    "

## 2021-11-22 ENCOUNTER — MYC MEDICAL ADVICE (OUTPATIENT)
Dept: FAMILY MEDICINE | Facility: CLINIC | Age: 26
End: 2021-11-22

## 2021-11-22 ENCOUNTER — OFFICE VISIT (OUTPATIENT)
Dept: FAMILY MEDICINE | Facility: CLINIC | Age: 26
End: 2021-11-22
Payer: COMMERCIAL

## 2021-11-22 VITALS
BODY MASS INDEX: 25.29 KG/M2 | WEIGHT: 166.9 LBS | HEIGHT: 68 IN | HEART RATE: 89 BPM | OXYGEN SATURATION: 98 % | TEMPERATURE: 98.3 F | DIASTOLIC BLOOD PRESSURE: 78 MMHG | SYSTOLIC BLOOD PRESSURE: 125 MMHG

## 2021-11-22 DIAGNOSIS — Z31.41 FERTILITY TESTING: Primary | ICD-10-CM

## 2021-11-22 DIAGNOSIS — R19.7 DIARRHEA, UNSPECIFIED TYPE: Primary | ICD-10-CM

## 2021-11-22 LAB
ESTRADIOL SERPL-MCNC: <11 PG/ML
FSH SERPL-ACNC: 0.3 IU/L
LH SERPL-ACNC: <0.2 IU/L
PROGEST SERPL-MCNC: 0.5 NG/ML
PROLACTIN SERPL-MCNC: 18 UG/L (ref 3–27)
TSH SERPL DL<=0.005 MIU/L-ACNC: 1.12 MU/L (ref 0.4–4)

## 2021-11-22 PROCEDURE — 83002 ASSAY OF GONADOTROPIN (LH): CPT | Performed by: FAMILY MEDICINE

## 2021-11-22 PROCEDURE — 83520 IMMUNOASSAY QUANT NOS NONAB: CPT | Mod: 90 | Performed by: FAMILY MEDICINE

## 2021-11-22 PROCEDURE — 84146 ASSAY OF PROLACTIN: CPT | Performed by: FAMILY MEDICINE

## 2021-11-22 PROCEDURE — 84443 ASSAY THYROID STIM HORMONE: CPT | Performed by: FAMILY MEDICINE

## 2021-11-22 PROCEDURE — 82670 ASSAY OF TOTAL ESTRADIOL: CPT | Performed by: FAMILY MEDICINE

## 2021-11-22 PROCEDURE — 84403 ASSAY OF TOTAL TESTOSTERONE: CPT | Performed by: FAMILY MEDICINE

## 2021-11-22 PROCEDURE — 99000 SPECIMEN HANDLING OFFICE-LAB: CPT | Performed by: FAMILY MEDICINE

## 2021-11-22 PROCEDURE — 99214 OFFICE O/P EST MOD 30 MIN: CPT | Performed by: FAMILY MEDICINE

## 2021-11-22 PROCEDURE — 82627 DEHYDROEPIANDROSTERONE: CPT | Performed by: FAMILY MEDICINE

## 2021-11-22 PROCEDURE — 36415 COLL VENOUS BLD VENIPUNCTURE: CPT | Performed by: FAMILY MEDICINE

## 2021-11-22 PROCEDURE — 84144 ASSAY OF PROGESTERONE: CPT | Performed by: FAMILY MEDICINE

## 2021-11-22 PROCEDURE — 83001 ASSAY OF GONADOTROPIN (FSH): CPT | Performed by: FAMILY MEDICINE

## 2021-11-22 ASSESSMENT — MIFFLIN-ST. JEOR: SCORE: 1527.61

## 2021-11-22 NOTE — PATIENT INSTRUCTIONS
Patient Education     Loperamide Caplets 2 mg  Uses  For diarrhea.  Instructions  This medicine may be taken with or without food.  Store at room temperature in a dry place. Do not keep in the bathroom.  Keep the medicine away from heat and light.  Drink plenty of water while on this medicine.  Avoid drinks with caffeine while on this medicine.  Please tell your doctor and pharmacist about all the medicines you take. Include both prescription and over-the-counter medicines. Also tell them about any vitamins, herbal medicines, or anything else you take for your health.  If your symptoms do not improve or they worsen while on this medicine, contact your doctor.  Cautions  This medicine is not approved for use in children younger than 2.  Tell your doctor and pharmacist if you ever had an allergic reaction to a medicine. Symptoms of an allergic reaction can include trouble breathing, skin rash, itching, swelling, or severe dizziness.  Do not use the medication any more than instructed.  Tell the doctor or pharmacist if you are pregnant, planning to be pregnant, or breastfeeding.  Ask your pharmacist if this medicine can interact with any of your other medicines. Be sure to tell them about all the medicines you take.  Side Effects  The following is a list of some common side effects from this medicine. Please speak with your doctor about what you should do if you experience these or other side effects.    constipation    dizziness    drowsiness or sedation    lack of energy and tiredness  Call your doctor or get medical help right away if you notice any of these more serious side effects:    severe or persistent abdominal pain    fainting    fast or irregular heart beats    severe or persistent vomiting  A few people may have an allergic reactions to this medicine. Symptoms can include difficulty breathing, skin rash, itching, swelling, or severe dizziness. If you notice any of these symptoms, seek medical help  quickly.  Extra  Please speak with your doctor, nurse, or pharmacist if you have any questions about this medicine.  [email or web address]  IMPORTANT NOTE: This document tells you briefly how to take your medicine, but it does not tell you all there is to know about it.Your doctor or pharmacist may give you other documents about your medicine. Please talk to them if you have any questions.Always follow their advice. There is a more complete description of this medicine available in English.Scan this code on your smartphone or tablet or use the web address below. You can also ask your pharmacist for a printout. If you have any questions, please ask your pharmacist.     2021 [email or web address].           Patient Education     Dicyclomine HCl Oral Capsule 10 mg  Uses  For irritable bowel syndrome.  Instructions  This medicine may be taken with or without food.  To relieve dry mouth while taking this medicine, try chewing gum or sucking on hard candy or ice chips. Drink extra water or use a saliva substitute.  It is very important that you take the medicine at about the same time every day. It will work best if you do this.  Store at room temperature in a dry place. Do not keep in the bathroom.  Keep the medicine away from heat and light.  To reduce constipation, eat high fiber foods, drink plenty of water and exercise.  Do not take this medicine with milk or milk products (such as yogurt).  Do not take antacids 2 hours before or 2 hours after taking this medicine.  It is important that you keep taking each dose of this medicine on time even if you are feeling well.  If you forget to take a dose on time, take it as soon as you remember. If it is almost time for the next dose, do not take the missed dose. Return to your normal dosing schedule. Do not take 2 doses of this medicine at one time.  Please tell your doctor and pharmacist about all the medicines you take. Include both prescription and  over-the-counter medicines. Also tell them about any vitamins, herbal medicines, or anything else you take for your health.  If your symptoms do not improve or they worsen while on this medicine, contact your doctor.  Do not suddenly stop taking this medicine. Check with your doctor before stopping.  This medicine may interfere with some lab test results. Be sure to tell all your healthcare providers that you are taking this medicine.  It is very important that you keep all appointments for medical exams and tests while on this medicine.  Cautions  Tell your doctor and pharmacist if you ever had an allergic reaction to a medicine. Symptoms of an allergic reaction can include trouble breathing, skin rash, itching, swelling, or severe dizziness.  Some patients taking this medicine have experienced serious side effects. Please speak with your doctor to understand the risks and benefits associated with this medicine.  Do not use the medication any more than instructed.  This medicine may cause dizziness or fainting, especially after exercising or in hot weather. Be very careful when standing or sitting up quickly.  Your ability to stay alert or to react quickly may be impaired by this medicine. Do not drive or operate machinery until you know how this medicine will affect you.  Please check with your doctor before drinking alcohol while on this medicine.  If you drink more than a few alcoholic beverages each day, ask your doctor whether you should be on this medicine.  Avoid becoming overheated during exercise or other activities. Try to stay cool in hot weather.  Call the doctor if there are any signs of confusion or unusual changes in behavior.  Tell the doctor or pharmacist if you are pregnant, planning to be pregnant, or breastfeeding.  Do not breastfeed while on this medicine. This medicine can pass through breast milk to the baby.  Ask your pharmacist if this medicine can interact with any of your other medicines.  Be sure to tell them about all the medicines you take.  Please tell all your doctors and dentists that you are on this medicine before they provide care.  Do not start or stop any other medicines without first speaking to your doctor or pharmacist.  Do not share this medicine with anyone who has not been prescribed this medicine.  Side Effects  The following is a list of some common side effects from this medicine. Please speak with your doctor about what you should do if you experience these or other side effects.    bloating    constipation    dizziness    drowsiness or sedation    dry mouth    dry eyes    muscle weakness    nausea    nervousness    blurring or changes of vision  Call your doctor or get medical help right away if you notice any of these more serious side effects:    loss of balance    changes in memory, mood, or thinking    difficulty concentrating    fainting    fast or irregular heart beats    slurred speech    urinating less often    difficulty or discomfort urinating  A few people may have an allergic reactions to this medicine. Symptoms can include difficulty breathing, skin rash, itching, swelling, or severe dizziness. If you notice any of these symptoms, seek medical help quickly.  Extra  Please speak with your doctor, nurse, or pharmacist if you have any questions about this medicine.  [email or web address]  IMPORTANT NOTE: This document tells you briefly how to take your medicine, but it does not tell you all there is to know about it.Your doctor or pharmacist may give you other documents about your medicine. Please talk to them if you have any questions.Always follow their advice. There is a more complete description of this medicine available in English.Scan this code on your smartphone or tablet or use the web address below. You can also ask your pharmacist for a printout. If you have any questions, please ask your pharmacist.     2021 [employer].

## 2021-11-23 LAB — DHEA-S SERPL-MCNC: 182 UG/DL (ref 35–430)

## 2021-11-24 LAB — TESTOST SERPL-MCNC: 35 NG/DL (ref 8–60)

## 2021-11-24 NOTE — TELEPHONE ENCOUNTER
Dear Joaquina  Although the lab did your fertility labs in error, your LH (Lutropin), Estradiol, and FSH were very low, which is something we normally see in patients taking birth control. I would recommend repeating them when you are on day 3 of your cycle to see if they change.      Anti-mullerian hormone that checks for ovarian reserve is pending.     Some of the labs were normal and they do not need to be repeated, Prolactin, DHEA Sulfate, Testosterone and TSH.       I placed an order for LH, FSH and Estradiol. If the AMH is abnormal, I would recommend repeating it as well.     Best Regards  Sarah Castillo MD

## 2021-11-26 LAB — MIS SERPL-MCNC: 3.34 NG/ML

## 2021-11-29 NOTE — RESULT ENCOUNTER NOTE
Dear Jaoquina,   Anti-mullerian hormone checks for your ovarian reserve. Your AMH number is normal which indicates a good ovarian reserve.     Best Regards   Sarah Castillo MD

## 2021-12-08 ENCOUNTER — APPOINTMENT (OUTPATIENT)
Dept: LAB | Facility: CLINIC | Age: 26
End: 2021-12-08
Payer: COMMERCIAL

## 2021-12-08 PROCEDURE — 83630 LACTOFERRIN FECAL (QUAL): CPT | Performed by: FAMILY MEDICINE

## 2021-12-08 PROCEDURE — 87506 IADNA-DNA/RNA PROBE TQ 6-11: CPT | Performed by: FAMILY MEDICINE

## 2021-12-09 ENCOUNTER — HOSPITAL ENCOUNTER (OUTPATIENT)
Dept: CT IMAGING | Facility: CLINIC | Age: 26
Discharge: HOME | End: 2021-12-09
Attending: FAMILY MEDICINE | Admitting: FAMILY MEDICINE
Payer: COMMERCIAL

## 2021-12-09 DIAGNOSIS — R19.7 DIARRHEA, UNSPECIFIED TYPE: ICD-10-CM

## 2021-12-09 LAB
C COLI+JEJUNI+LARI FUSA STL QL NAA+PROBE: NOT DETECTED
EC STX1 GENE STL QL NAA+PROBE: NOT DETECTED
EC STX2 GENE STL QL NAA+PROBE: NOT DETECTED
LACTOFERRIN STL QL IA: NEGATIVE
NOROV GI+II ORF1-ORF2 JNC STL QL NAA+PR: NOT DETECTED
RVA NSP5 STL QL NAA+PROBE: NOT DETECTED
SALMONELLA SP RPOD STL QL NAA+PROBE: NOT DETECTED
SHIGELLA SP+EIEC IPAH STL QL NAA+PROBE: NOT DETECTED
V CHOL+PARA RFBL+TRKH+TNAA STL QL NAA+PR: NOT DETECTED
Y ENTERO RECN STL QL NAA+PROBE: NOT DETECTED

## 2021-12-09 PROCEDURE — 74177 CT ABD & PELVIS W/CONTRAST: CPT

## 2021-12-09 PROCEDURE — 250N000009 HC RX 250: Performed by: FAMILY MEDICINE

## 2021-12-09 PROCEDURE — 250N000011 HC RX IP 250 OP 636: Performed by: FAMILY MEDICINE

## 2021-12-09 RX ORDER — IOPAMIDOL 755 MG/ML
81 INJECTION, SOLUTION INTRAVASCULAR ONCE
Status: COMPLETED | OUTPATIENT
Start: 2021-12-09 | End: 2021-12-09

## 2021-12-09 RX ADMIN — IOPAMIDOL 81 ML: 755 INJECTION, SOLUTION INTRAVENOUS at 17:29

## 2021-12-09 RX ADMIN — SODIUM CHLORIDE 63 ML: 9 INJECTION, SOLUTION INTRAVENOUS at 17:29

## 2021-12-11 DIAGNOSIS — G43.809 OTHER MIGRAINE WITHOUT STATUS MIGRAINOSUS, NOT INTRACTABLE: ICD-10-CM

## 2021-12-13 DIAGNOSIS — K62.5 RECTAL BLEEDING: Primary | ICD-10-CM

## 2021-12-14 NOTE — TELEPHONE ENCOUNTER
Anti-Seizure Meds Protocol  Failed 12/13/2021 03:13 PM   Protocol Details  Review Authorizing provider's last note.     Normal ALT or AST on file in past 26 months    Medication is active on med list     Note from 8/24/21 VV with neurology:    Plan:  Headache log  Wean off topiramate but can wait until stable on verapamil  Verapamil trial 40 mg daily for one week than 40 mg twice daily     Headroomt message sent to patient.  Emilee Bailey RN

## 2021-12-15 RX ORDER — TOPIRAMATE 50 MG/1
TABLET, FILM COATED ORAL
Qty: 30 TABLET | Refills: 0 | OUTPATIENT
Start: 2021-12-15

## 2021-12-15 NOTE — TELEPHONE ENCOUNTER
Left message for patient to call [facility] back  When patient calls back please transfer to SOLANGE HOSKINS RN

## 2021-12-23 DIAGNOSIS — G43.719 INTRACTABLE CHRONIC MIGRAINE WITHOUT AURA AND WITHOUT STATUS MIGRAINOSUS: Primary | ICD-10-CM

## 2021-12-23 DIAGNOSIS — G43.019 INTRACTABLE MIGRAINE WITHOUT AURA AND WITHOUT STATUS MIGRAINOSUS: ICD-10-CM

## 2021-12-24 ENCOUNTER — TELEPHONE (OUTPATIENT)
Dept: NEUROLOGY | Facility: CLINIC | Age: 26
End: 2021-12-24
Payer: COMMERCIAL

## 2021-12-24 NOTE — TELEPHONE ENCOUNTER
Prior Authorization Retail Medication Request    Medication/Dose: Rimegepant Sulfate 75 MG TBDP; Route: Take 75 mg by mouth See Admin Instructions Take 75 mg orally every other day - Oral  ICD code (if different than what is on RX):    G43.019  Previously Tried and Failed:  naratriptan, sumatriptan and zolmitriptans, Hydroxyzine and prozac-anxiety/depression  Verapamil 40 mg twice daily but does not help and no difference  Rationale:  Episodic migraine. Recommend nurtec at every other day dosing to help prevent episodic migraine. Pt has 9 headache days per month.     Insurance Name:  [email or web address]  Insurance ID:  [id number]       Pharmacy Information (if different than what is on RX)  Name:    Phone:

## 2021-12-24 NOTE — TELEPHONE ENCOUNTER
Central Prior Authorization Team   Phone: [phone]      (Qty Limit 16 per 30 days) Initiation    Medication: Rimegepant Sulfate 75 MG TBDP (Qty Limit 16 per 30 days)  Insurance Company: [employer] - Phone [phone] Fax [phone]  Pharmacy Filling the Rx: [id number] - [place], [place] - [address]  Filling Pharmacy Phone: [phone]  Filling Pharmacy Fax:    Start Date: 12/24/2021

## 2021-12-24 NOTE — TELEPHONE ENCOUNTER
Qty Limit of 16 tabs per 30 days is denied.  Qty of 8 tabs per 30 days is approved.    Medication: Rimegepant Sulfate 75 MG TBDP (Qty Limit 16 per 30 days)    Denial Date:  12/24/2021    Denial Rational:         Appeal Information:

## 2021-12-28 DIAGNOSIS — G43.019 INTRACTABLE MIGRAINE WITHOUT AURA AND WITHOUT STATUS MIGRAINOSUS: ICD-10-CM

## 2022-01-14 DIAGNOSIS — G43.019 INTRACTABLE MIGRAINE WITHOUT AURA AND WITHOUT STATUS MIGRAINOSUS: Primary | ICD-10-CM

## 2022-01-14 RX ORDER — CEFUROXIME AXETIL 250 MG/1
6 TABLET ORAL
Qty: 3 KIT | Refills: 5 | Status: SHIPPED | OUTPATIENT
Start: 2022-01-14 | End: 2022-01-17

## 2022-01-17 DIAGNOSIS — G43.019 INTRACTABLE MIGRAINE WITHOUT AURA AND WITHOUT STATUS MIGRAINOSUS: ICD-10-CM

## 2022-01-17 RX ORDER — CEFUROXIME AXETIL 250 MG/1
6 TABLET ORAL
Qty: 3 KIT | Refills: 5 | Status: SHIPPED | OUTPATIENT
Start: 2022-01-17 | End: 2023-09-22

## 2022-01-19 ENCOUNTER — VIRTUAL VISIT (OUTPATIENT)
Dept: NEUROLOGY | Facility: CLINIC | Age: 27
End: 2022-01-19
Payer: COMMERCIAL

## 2022-01-19 DIAGNOSIS — G43.719 INTRACTABLE CHRONIC MIGRAINE WITHOUT AURA AND WITHOUT STATUS MIGRAINOSUS: Primary | ICD-10-CM

## 2022-01-19 PROCEDURE — 99213 OFFICE O/P EST LOW 20 MIN: CPT | Mod: GT | Performed by: NURSE PRACTITIONER

## 2022-01-19 RX ORDER — GABAPENTIN 100 MG/1
CAPSULE ORAL
Qty: 90 CAPSULE | Refills: 3 | Status: SHIPPED | OUTPATIENT
Start: 2022-01-19 | End: 2022-07-05

## 2022-01-19 RX ORDER — PROCHLORPERAZINE MALEATE 5 MG
5-10 TABLET ORAL EVERY 6 HOURS PRN
Qty: 20 TABLET | Refills: 3 | Status: SHIPPED | OUTPATIENT
Start: 2022-01-19 | End: 2022-11-03

## 2022-01-19 ASSESSMENT — HEADACHE IMPACT TEST (HIT 6)
HIT6 TOTAL SCORE: 65
WHEN YOU HAVE HEADACHES HOW OFTEN IS THE PAIN SEVERE: SOMETIMES
HOW OFTEN DO HEADACHES LIMIT YOUR DAILY ACTIVITIES: VERY OFTEN
HOW OFTEN HAVE YOU FELT TOO TIRED TO WORK BECAUSE OF YOUR HEADACHES: VERY OFTEN
HOW OFTEN HAVE YOU FELT FED UP OR IRRITATED BECAUSE OF YOUR HEADACHES: VERY OFTEN
HOW OFTEN HAVE YOU FELT FED UP OR IRRITATED BECAUSE OF YOUR HEADACHES: VERY OFTEN
HIT6 TOTAL SCORE: 65
WHEN YOU HAVE A HEADACHE HOW OFTEN DO YOU WISH YOU COULD LIE DOWN: VERY OFTEN
WHEN YOU HAVE HEADACHES HOW OFTEN IS THE PAIN SEVERE: SOMETIMES
HOW OFTEN HAVE YOU FELT TOO TIRED TO WORK BECAUSE OF YOUR HEADACHES: VERY OFTEN
HOW OFTEN DO HEADACHES LIMIT YOUR DAILY ACTIVITIES: VERY OFTEN
HOW OFTEN DID HEADACHS LIMIT CONCENTRATION ON WORK OR DAILY ACTIVITY: VERY OFTEN
WHEN YOU HAVE A HEADACHE HOW OFTEN DO YOU WISH YOU COULD LIE DOWN: VERY OFTEN
HOW OFTEN DID HEADACHS LIMIT CONCENTRATION ON WORK OR DAILY ACTIVITY: VERY OFTEN

## 2022-01-19 NOTE — LETTER
1/19/2022       RE: Ary Lutz  [address] [place] [address]     Dear Colleague,    Thank you for referring your patient, Ary Lutz, to the [facility] [place] [facility]. Please see a copy of my visit note below.        MIGRAINE DISABILITY ASSESSMENT (MIDAS)    On how many days in the last 3 months did you miss work or school because of your headaches?  1    How many days in the last 3 months was your productivity at work or school reduced by half or more because of your headaches? (Do not include days you counted in question 1 where you missed work or school.)  14    On how many days in the last 3 months did you not do household work (such as housework, home repairs and maintenance, shopping, caring for children and relatives) because of your headaches?  2    How many days in the last 3 months was your productivity in household work reduced by half or more because of your headaches? (Do not include days you counted in question 3 where you did not do household work).  14    On how many days in the last 3 months did you miss family, social, or lesiure activities because of your headaches?  4    MIDAS Total Score:     On how many days in the last 3 months did you have a headache? (If a headache lasted more than 1 day, count each day.)   42    On a scale of 0 - 10, on average how painful were these headaches (where 0 = no pain at all, and 10 = pain as bad as it can be.)  7    Last Patient-Answered HIT-6 Questionnaire  HIT-6 1/19/2022   When you have headaches, how often is the pain severe 10   How often do headaches limit your ability to do usual daily activities including household work, work, school, or social activities? 11   When you have a headache, how often do you wish you could lie down? 11   In the past 4 weeks, how often have you felt too tired to do work or daily activities because of your headaches 11   In  the past 4 weeks, how often have you felt fed up or irritated because of your headaches 11   In the past 4 weeks, how often did headaches limit your ability to concentrate on work or daily activities 11   HIT-6 Total Score 65       Headache Clinic follow up note:  Headache updates:  Headache frequency In average 8-9/month   Nurtec helps but takes sometime and naratriptan   Headaches come back 1-4 days later  Sumatriptan injection and naratriptan no more than 9 days per month and cannot be used the same day with naratriptan. Sumatriptan can be used with Nurtec.   Alternatively -may try Migranal     Plan:  Rescue treatment -Nurtec and naratriptan or sumatriptan inj as needed.   Naproxen as needed and limit use to no more than 14 days per month  May try prochlorperazine for nausea and migraines +benedryl. Prochlorperazine may cause anxiety, drossiness or muscle tightness.   Prevention -Schedule for Botox once benefit is verified   Adding gabapentin 100 mg at bedtime for one week than two caps at bedtime for one week than one cap am and two caps at bedtime   Follow up after 1-2 rounds of Botox or sooner if needed sooner if needed    Patient is alert and no in apparent acute distress,  mentation appears normal, judgement and insight intact, normal speech.    I discussed all my recommendations with Ary Lutz who verbalizes understanding and comfortable with the plan.  All of patient's questions were answered from the best of my knowledge.  Patient is in agreement with the plan.     23 minutes spent on the date of the encounter doing video access, chart  review, exam, re meds review, treatment plan, documentation and further activities as noted above    ROBE Urbina, CNP [facility]  Headache certified  [facility]

## 2022-01-19 NOTE — PROGRESS NOTES
Joaquina is a 26 year old who is being evaluated via a billable video visit.      How would you like to obtain your AVS? MyChart  If the video visit is dropped, the invitation should be resent by: Text to cell phone: [phone]  Will anyone else be joining your video visit? No      Video Start Time: 3:18 PM  Video-Visit Details    Type of service:  Video Visit    Video End Time:    Originating Location (pt. Location): Home    Distant Location (provider location):  [facility]     Platform used for Video Visit: Dora     MIGRAINE DISABILITY ASSESSMENT (MIDAS)    On how many days in the last 3 months did you miss work or school because of your headaches?  1    How many days in the last 3 months was your productivity at work or school reduced by half or more because of your headaches? (Do not include days you counted in question 1 where you missed work or school.)  14    On how many days in the last 3 months did you not do household work (such as housework, home repairs and maintenance, shopping, caring for children and relatives) because of your headaches?  2    How many days in the last 3 months was your productivity in household work reduced by half or more because of your headaches? (Do not include days you counted in question 3 where you did not do household work).  14    On how many days in the last 3 months did you miss family, social, or lesiure activities because of your headaches?  4    MIDAS Total Score:     On how many days in the last 3 months did you have a headache? (If a headache lasted more than 1 day, count each day.)   42    On a scale of 0 - 10, on average how painful were these headaches (where 0 = no pain at all, and 10 = pain as bad as it can be.)  7    Last Patient-Answered HIT-6 Questionnaire  HIT-6 1/19/2022   When you have headaches, how often is the pain severe 10   How often do headaches limit your ability to do usual daily activities including household  work, work, school, or social activities? 11   When you have a headache, how often do you wish you could lie down? 11   In the past 4 weeks, how often have you felt too tired to do work or daily activities because of your headaches 11   In the past 4 weeks, how often have you felt fed up or irritated because of your headaches 11   In the past 4 weeks, how often did headaches limit your ability to concentrate on work or daily activities 11   HIT-6 Total Score 65       Headache Clinic follow up note:  Headache updates:  Headache frequency In average 8-9/month   Nurtec helps but takes sometime and naratriptan   Headaches come back 1-4 days later  Sumatriptan injection and naratriptan no more than 9 days per month and cannot be used the same day with naratriptan. Sumatriptan can be used with Nurtec.   Alternatively -may try Migranal     Plan:  Rescue treatment -Nurtec and naratriptan or sumatriptan inj as needed.   Naproxen as needed and limit use to no more than 14 days per month  May try prochlorperazine for nausea and migraines +benedryl. Prochlorperazine may cause anxiety, drossiness or muscle tightness.   Prevention -Schedule for Botox once benefit is verified   Adding gabapentin 100 mg at bedtime for one week than two caps at bedtime for one week than one cap am and two caps at bedtime   Follow up after 1-2 rounds of Botox or sooner if needed sooner if needed    Patient is alert and no in apparent acute distress,  mentation appears normal, judgement and insight intact, normal speech.    I discussed all my recommendations with Ary Lutz who verbalizes understanding and comfortable with the plan.  All of patient's questions were answered from the best of my knowledge.  Patient is in agreement with the plan.     23 minutes spent on the date of the encounter doing video access, chart  review, exam, re meds review, treatment plan, documentation and further activities as noted above    ROBE Urbina,  CNP [facility]  Headache certified  [facility]

## 2022-01-20 ENCOUNTER — TELEPHONE (OUTPATIENT)
Dept: NEUROLOGY | Facility: CLINIC | Age: 27
End: 2022-01-20
Payer: COMMERCIAL

## 2022-01-20 NOTE — TELEPHONE ENCOUNTER
----- Message from Dixie Willingham RN sent at 1/20/2022  9:50 AM CST -----  Regarding: FW: Botox approval    ----- Message -----  From: Coretta Shea APRN CNP  Sent: 1/19/2022   3:50 PM CST  To: Dunia Robins RN  Subject: FW: Botox approval                               Please add patient to any of my in person Clinic day for Botox. Thank you  ----- Message -----  From: Poppy Gonsalez  Sent: 1/19/2022   3:43 PM CST  To: ROBE Vargas CNP  Subject: RE: Botox approval                               Raji Hitchcock    The PA appeal was overturned & approved for 200 units every 70 days from 1/13/22 - 1/13/23, so this patient is good to go.    Thank youPoppy  ----- Message -----  From: Coretta Shea APRN CNP  Sent: 1/19/2022   3:36 PM CST  To: Poppy Gonsalez  Subject: Botox approval                                   Can you please verify whether patient was approved. Thank you

## 2022-01-28 NOTE — TELEPHONE ENCOUNTER
Patient called confirm botox injection, was under the impression that she was on the schedule for today. Next available is not until April, pt wondering is she can be seen sooner?

## 2022-02-07 ENCOUNTER — OFFICE VISIT (OUTPATIENT)
Dept: NEUROLOGY | Facility: CLINIC | Age: 27
End: 2022-02-07
Payer: COMMERCIAL

## 2022-02-07 DIAGNOSIS — G43.719 INTRACTABLE CHRONIC MIGRAINE WITHOUT AURA AND WITHOUT STATUS MIGRAINOSUS: Primary | ICD-10-CM

## 2022-02-07 PROCEDURE — 64615 CHEMODENERV MUSC MIGRAINE: CPT | Performed by: NURSE PRACTITIONER

## 2022-02-07 ASSESSMENT — HEADACHE IMPACT TEST (HIT 6)
HOW OFTEN HAVE YOU FELT FED UP OR IRRITATED BECAUSE OF YOUR HEADACHES: VERY OFTEN
HOW OFTEN HAVE YOU FELT TOO TIRED TO WORK BECAUSE OF YOUR HEADACHES: SOMETIMES
WHEN YOU HAVE HEADACHES HOW OFTEN IS THE PAIN SEVERE: SOMETIMES
HOW OFTEN DO HEADACHES LIMIT YOUR DAILY ACTIVITIES: VERY OFTEN
HOW OFTEN DID HEADACHS LIMIT CONCENTRATION ON WORK OR DAILY ACTIVITY: VERY OFTEN
HIT6 TOTAL SCORE: 66
WHEN YOU HAVE A HEADACHE HOW OFTEN DO YOU WISH YOU COULD LIE DOWN: ALWAYS

## 2022-02-07 NOTE — PROGRESS NOTES
[facility] [address] [address] [phone]  Dept: [phone]  ______________________________________________________________________________    Patient: Ary Lutz   : 1995   MRN: [id number]   2022    INVASIVE PROCEDURE SAFETY CHECKLIST    Date: 2022   Procedure:Botox injections with chronic migraine headache protocol  Patient Name: Ary Lutz  MRN: [id number]  YOB: 1995    Action: Complete sections as appropriate. Any discrepancy results in a HARD COPY until resolved.     PRE PROCEDURE:  Patient ID verified with 2 identifiers (name and  or MRN): Yes  Procedure and site verified with patient/designee (when able): Yes  Accurate consent documentation in medical record: Yes  H&P (or appropriate assessment) documented in medical record: Yes  H&P must be up to 20 days prior to procedure and updates within 24 hours of procedure as applicable: NA  Relevant diagnostic and radiology test results appropriately labeled and displayed as applicable: NA  Procedure site(s) marked with provider initials: NA    TIMEOUT:  Time-Out performed immediately prior to starting procedure, including verbal and active participation of all team members addressing the following:Yes  * Correct patient identify  * Confirmed that the correct side and site are marked  * An accurate procedure consent form  * Agreement on the procedure to be done  * Correct patient position  * Relevant images and results are properly labeled and appropriately displayed  * The need to administer antibiotics or fluids for irrigation purposes during the procedure as applicable   * Safety precautions based on patient history or medication use    DURING PROCEDURE: Verification of correct person, site, and procedures any time the responsibility for care of the patient is transferred to another member of the care team.     PROCEDURE  NOTE:    BOTULINUM NEUROTOXIN INJECTION PROCEDURE:  DATA:2/7/2022  INDICATIONS FOR PROCEDURES:   chronic migraine headaches.    baseline symptoms have been recalcitrant to oral medications and conservative therapy. Patient is here today for an initial injection with Botox.    GOAL OF PROCEDURE:  The goal of this procedure is to decrease pain and enhance functional independence.    TOTAL DOSE ADMINISTERED:  Dose Administered:  155 units  Botox (Botulinum Toxin Type A)       2:1 Dilution    See MAR  Wasted 45 units  Medication guide was given to patient    CONSENT:  The risks, benefits, and treatment options were discussed with the patient who agreed to proceed.    Written consent was obtained     EQUIPMENT USED:  Needles-30 gauge, 0.5 inches for injections  Four 1-ml tuberculin syringes for injections  One sodium chloride 10 ml vial preservative free  Alcohol swabs    SKIN PREPARATION:  Skin preparation was performed using an alcohol wipe.      AREA/MUSCLE INJECTED:  155 units of Botox  Right upper Trapezius (upper cervical) - 5 units of Botox at 3 site/s.   Left upper Trapezius (upper cervical) - 5 units of Botox at 3 site/s.     Right cervical paraspinals - 5 units of Botox at 2 site/s.   Left cervical paraspinals - 5 units of Botox at 2 site/s.     Left occipitalis - 5 units of Botox at 3 site/s.  Right occipitalis -5 units of Botox at 3 site/s      Right Frontalis - 5 units of Botox at 2 site/s.  Left Frontalis - 5 units of Botox at 2 site/s.    Right Temporalis - 5 units of Botox at 4 site/s.  Left Temporalis - 5 units of Botox at 4 site/s.    Right  - 5 units of Botox at 1 site/s.              Left  - 5 units of Botox at 1 site/s.    Procerus - 5 units of Botox at 1 site/s.    RESPONSE TO PROCEDURE:  tolerated the procedure well and there were no immediate complications.  Patient was allowed to recover for an appropriate period of time and was discharged home in stable condition.    FOLLOW  UP:    Repeat Botox injections in 12 weeks      This procedure was performed under a hospital privileging agreement with Dr. Sarabjit Shea, ROBE, CNP  [facility]

## 2022-02-07 NOTE — LETTER
2022       RE: Ary Lutz  [address] [facility] [address]     Dear Colleague,    Thank you for referring your patient, Ary Lutz, to the [facility]UROLOG[facility] [place] at [facility]. Please see a copy of my visit note below.    [facility] NEUROLOGY CL[facility]IC [place] [address] [facility]  3RD FLOOR  [facility] [address] [phone]  Dept: [phone]  ______________________________________________________________________________    Patient: Ary Lutz   : 1995   MRN: [id number]   2022    INVASIVE PROCEDURE SAFETY CHECKLIST    Date: 2022   Procedure:Botox injections with chronic migraine headache protocol  Patient Name: Ary Lutz  MRN: [id number]  YOB: 1995    Action: Complete sections as appropriate. Any discrepancy results in a HARD COPY until resolved.     PRE PROCEDURE:  Patient ID verified with 2 identifiers (name and  or MRN): Yes  Procedure and site verified with patient/designee (when able): Yes  Accurate consent documentation in medical record: Yes  H&P (or appropriate assessment) documented in medical record: Yes  H&P must be up to 20 days prior to procedure and updates within 24 hours of procedure as applicable: NA  Relevant diagnostic and radiology test results appropriately labeled and displayed as applicable: NA  Procedure site(s) marked with provider initials: NA    TIMEOUT:  Time-Out performed immediately prior to starting procedure, including verbal and active participation of all team members addressing the following:Yes  * Correct patient identify  * Confirmed that the correct side and site are marked  * An accurate procedure consent form  * Agreement on the procedure to be done  * Correct patient position  * Relevant images and results are properly labeled and appropriately displayed  * The need to administer antibiotics or fluids for  irrigation purposes during the procedure as applicable   * Safety precautions based on patient history or medication use    DURING PROCEDURE: Verification of correct person, site, and procedures any time the responsibility for care of the patient is transferred to another member of the care team.     PROCEDURE NOTE:    BOTULINUM NEUROTOXIN INJECTION PROCEDURE:  DATA:2/7/2022  INDICATIONS FOR PROCEDURES:   chronic migraine headaches.    baseline symptoms have been recalcitrant to oral medications and conservative therapy. Patient is here today for an initial injection with Botox.    GOAL OF PROCEDURE:  The goal of this procedure is to decrease pain and enhance functional independence.    TOTAL DOSE ADMINISTERED:  Dose Administered:  155 units  Botox (Botulinum Toxin Type A)       2:1 Dilution    See MAR  Wasted 45 units  Medication guide was given to patient    CONSENT:  The risks, benefits, and treatment options were discussed with the patient who agreed to proceed.    Written consent was obtained     EQUIPMENT USED:  Needles-30 gauge, 0.5 inches for injections  Four 1-ml tuberculin syringes for injections  One sodium chloride 10 ml vial preservative free  Alcohol swabs    SKIN PREPARATION:  Skin preparation was performed using an alcohol wipe.      AREA/MUSCLE INJECTED:  155 units of Botox  Right upper Trapezius (upper cervical) - 5 units of Botox at 3 site/s.   Left upper Trapezius (upper cervical) - 5 units of Botox at 3 site/s.     Right cervical paraspinals - 5 units of Botox at 2 site/s.   Left cervical paraspinals - 5 units of Botox at 2 site/s.     Left occipitalis - 5 units of Botox at 3 site/s.  Right occipitalis -5 units of Botox at 3 site/s      Right Frontalis - 5 units of Botox at 2 site/s.  Left Frontalis - 5 units of Botox at 2 site/s.    Right Temporalis - 5 units of Botox at 4 site/s.  Left Temporalis - 5 units of Botox at 4 site/s.    Right  - 5 units of Botox at 1 site/s.               Left  - 5 units of Botox at 1 site/s.    Procerus - 5 units of Botox at 1 site/s.    RESPONSE TO PROCEDURE:  tolerated the procedure well and there were no immediate complications.  Patient was allowed to recover for an appropriate period of time and was discharged home in stable condition.    FOLLOW UP:    Repeat Botox injections in 12 weeks      This procedure was performed under a hospital privileging agreement with Dr. Sarabjit Shea, ROBE, [facility]

## 2022-02-09 DIAGNOSIS — G43.719 INTRACTABLE CHRONIC MIGRAINE WITHOUT AURA AND WITHOUT STATUS MIGRAINOSUS: Primary | ICD-10-CM

## 2022-02-09 RX ORDER — CYCLOBENZAPRINE HCL 5 MG
5-10 TABLET ORAL
Qty: 20 TABLET | Refills: 0 | Status: SHIPPED | OUTPATIENT
Start: 2022-02-09 | End: 2023-09-22

## 2022-02-24 ENCOUNTER — E-VISIT (OUTPATIENT)
Dept: FAMILY MEDICINE | Facility: CLINIC | Age: 27
End: 2022-02-24
Payer: COMMERCIAL

## 2022-02-24 ENCOUNTER — MYC MEDICAL ADVICE (OUTPATIENT)
Dept: FAMILY MEDICINE | Facility: CLINIC | Age: 27
End: 2022-02-24

## 2022-02-24 DIAGNOSIS — I49.8 FLUTTERING HEART: Primary | ICD-10-CM

## 2022-02-24 PROCEDURE — 99421 OL DIG E/M SVC 5-10 MIN: CPT | Performed by: FAMILY MEDICINE

## 2022-02-26 DIAGNOSIS — F41.1 GAD (GENERALIZED ANXIETY DISORDER): ICD-10-CM

## 2022-02-28 RX ORDER — FLUOXETINE 40 MG/1
CAPSULE ORAL
Qty: 90 CAPSULE | Refills: 3 | Status: SHIPPED | OUTPATIENT
Start: 2022-02-28 | End: 2022-11-28

## 2022-03-10 ENCOUNTER — MYC MEDICAL ADVICE (OUTPATIENT)
Dept: FAMILY MEDICINE | Facility: CLINIC | Age: 27
End: 2022-03-10

## 2022-03-10 ENCOUNTER — HOSPITAL ENCOUNTER (OUTPATIENT)
Dept: CARDIOLOGY | Facility: CLINIC | Age: 27
Discharge: HOME | End: 2022-03-10
Attending: FAMILY MEDICINE | Admitting: FAMILY MEDICINE
Payer: COMMERCIAL

## 2022-03-10 DIAGNOSIS — I49.8 FLUTTERING HEART: ICD-10-CM

## 2022-03-10 LAB — LVEF ECHO: NORMAL

## 2022-03-10 PROCEDURE — 93306 TTE W/DOPPLER COMPLETE: CPT | Mod: 26 | Performed by: INTERNAL MEDICINE

## 2022-03-10 PROCEDURE — 93306 TTE W/DOPPLER COMPLETE: CPT

## 2022-05-02 ENCOUNTER — OFFICE VISIT (OUTPATIENT)
Dept: NEUROLOGY | Facility: CLINIC | Age: 27
End: 2022-05-02
Payer: COMMERCIAL

## 2022-05-02 DIAGNOSIS — G43.719 INTRACTABLE CHRONIC MIGRAINE WITHOUT AURA AND WITHOUT STATUS MIGRAINOSUS: Primary | ICD-10-CM

## 2022-05-02 PROCEDURE — 64615 CHEMODENERV MUSC MIGRAINE: CPT | Performed by: NURSE PRACTITIONER

## 2022-05-02 ASSESSMENT — HEADACHE IMPACT TEST (HIT 6)
HOW OFTEN HAVE YOU FELT FED UP OR IRRITATED BECAUSE OF YOUR HEADACHES: VERY OFTEN
WHEN YOU HAVE HEADACHES HOW OFTEN IS THE PAIN SEVERE: VERY OFTEN
HOW OFTEN HAVE YOU FELT TOO TIRED TO WORK BECAUSE OF YOUR HEADACHES: VERY OFTEN
HOW OFTEN DO HEADACHES LIMIT YOUR DAILY ACTIVITIES: SOMETIMES
WHEN YOU HAVE A HEADACHE HOW OFTEN DO YOU WISH YOU COULD LIE DOWN: VERY OFTEN
HOW OFTEN DID HEADACHS LIMIT CONCENTRATION ON WORK OR DAILY ACTIVITY: SOMETIMES
HIT6 TOTAL SCORE: 64

## 2022-05-02 NOTE — PROGRESS NOTES
[facility] NEUROLOGY CLINIC [address] [address] [phone]  Dept: [phone]  ______________________________________________________________________________     INVASIVE PROCEDURE SAFETY CHECKLIST     Date:2022   Procedure:Botox injections with chronic migraine headache protocol  Patient Name: Ary Lutz  MRN: [id number]  YOB: 1995     Action: Complete sections as appropriate. Any discrepancy results in a HARD COPY until resolved.      PRE PROCEDURE:  Patient ID verified with 2 identifiers (name and  or MRN): Yes  Procedure and site verified with patient/designee (when able): Yes  Accurate consent documentation in medical record: Yes  H&P (or appropriate assessment) documented in medical record: Yes  H&P must be up to 20 days prior to procedure and updates within 24 hours of procedure as applicable: NA  Relevant diagnostic and radiology test results appropriately labeled and displayed as applicable: NA  Procedure site(s) marked with provider initials: NA     TIMEOUT:  Time-Out performed immediately prior to starting procedure, including verbal and active participation of all team members addressing the following:Yes  * Correct patient identify  * Confirmed that the correct side and site are marked  * An accurate procedure consent form  * Agreement on the procedure to be done  * Correct patient position  * Relevant images and results are properly labeled and appropriately displayed  * The need to administer antibiotics or fluids for irrigation purposes during the procedure as applicable   * Safety precautions based on patient history or medication use     DURING PROCEDURE: Verification of correct person, site, and procedures any time the responsibility for care of the patient is transferred to another member of the care team.      PROCEDURE NOTE:     BOTULINUM NEUROTOXIN INJECTION PROCEDURE:  DATA:2022  INDICATIONS FOR  PROCEDURES:   chronic migraine headaches.    baseline symptoms have been recalcitrant to oral medications and conservative therapy. Patient is here today for a repeat  injection with Botox.No problems reported since last Botox visit on 2/7/2022.      GOAL OF PROCEDURE:  The goal of this procedure is to decrease pain and enhance functional independence.     TOTAL DOSE ADMINISTERED:  Dose Administered:  155 units  Botox (Botulinum Toxin Type A)       2:1 Dilution    See MAR  Wasted 45 units  Medication guide was given to patient     CONSENT:  The risks, benefits, and treatment options were discussed with the patient who agreed to proceed.     Written consent was obtained      EQUIPMENT USED:  Needles-30 gauge, 0.5 inches for injections  Four 1-ml tuberculin syringes for injections  One sodium chloride 10 ml vial preservative free  Alcohol swabs     SKIN PREPARATION:  Skin preparation was performed using an alcohol wipe.        AREA/MUSCLE INJECTED:  155 units of Botox  Right upper Trapezius (upper cervical) - 5 units of Botox at 3 site/s.   Left upper Trapezius (upper cervical) - 5 units of Botox at 3 site/s.      Right cervical paraspinals - 5 units of Botox at 2 site/s.   Left cervical paraspinals - 5 units of Botox at 2 site/s.      Left occipitalis - 5 units of Botox at 3 site/s.  Right occipitalis -5 units of Botox at 3 site/s        Right Frontalis - 5 units of Botox at 2 site/s.  Left Frontalis - 5 units of Botox at 2 site/s.     Right Temporalis - 5 units of Botox at 4 site/s.  Left Temporalis - 5 units of Botox at 4 site/s.     Right  - 5 units of Botox at 1 site/s.              Left  - 5 units of Botox at 1 site/s.     Procerus - 5 units of Botox at 1 site/s.     RESPONSE TO PROCEDURE:  tolerated the procedure well and there were no immediate complications.  Patient was allowed to recover for an appropriate period of time and was discharged home in stable condition.     FOLLOW  UP:     Repeat Botox injections in 12 weeks        This procedure was performed under a hospital privileging agreement with Dr. Sarabjit Shea, ROBE, CNP  [facility]

## 2022-05-02 NOTE — LETTER
2022       RE: Ary Lutz  [address] [facility] [address]     Dear Colleague,    Thank you for referring your patient, Ary Lutz, to the [facility]UROLOGY CLIN[facility] [place] at [facility]. Please see a copy of my visit note below.    [facility]UROLOG[facility] [place]  [address]  3RD FLOOR  [facility] [address] [phone]  Dept: [phone]  ______________________________________________________________________________     INVASIVE PROCEDURE SAFETY CHECKLIST     Date:2022   Procedure:Botox injections with chronic migraine headache protocol  Patient Name: Ary Lutz  MRN: [id number]  YOB: 1995     Action: Complete sections as appropriate. Any discrepancy results in a HARD COPY until resolved.      PRE PROCEDURE:  Patient ID verified with 2 identifiers (name and  or MRN): Yes  Procedure and site verified with patient/designee (when able): Yes  Accurate consent documentation in medical record: Yes  H&P (or appropriate assessment) documented in medical record: Yes  H&P must be up to 20 days prior to procedure and updates within 24 hours of procedure as applicable: NA  Relevant diagnostic and radiology test results appropriately labeled and displayed as applicable: NA  Procedure site(s) marked with provider initials: NA     TIMEOUT:  Time-Out performed immediately prior to starting procedure, including verbal and active participation of all team members addressing the following:Yes  * Correct patient identify  * Confirmed that the correct side and site are marked  * An accurate procedure consent form  * Agreement on the procedure to be done  * Correct patient position  * Relevant images and results are properly labeled and appropriately displayed  * The need to administer antibiotics or fluids for irrigation purposes during the procedure as applicable   * Safety precautions  based on patient history or medication use     DURING PROCEDURE: Verification of correct person, site, and procedures any time the responsibility for care of the patient is transferred to another member of the care team.      PROCEDURE NOTE:     BOTULINUM NEUROTOXIN INJECTION PROCEDURE:  DATA:5/2/2022  INDICATIONS FOR PROCEDURES:   chronic migraine headaches.    baseline symptoms have been recalcitrant to oral medications and conservative therapy. Patient is here today for a repeat  injection with Botox.No problems reported since last Botox visit on 2/7/2022.      GOAL OF PROCEDURE:  The goal of this procedure is to decrease pain and enhance functional independence.     TOTAL DOSE ADMINISTERED:  Dose Administered:  155 units  Botox (Botulinum Toxin Type A)       2:1 Dilution    See MAR  Wasted 45 units  Medication guide was given to patient     CONSENT:  The risks, benefits, and treatment options were discussed with the patient who agreed to proceed.     Written consent was obtained      EQUIPMENT USED:  Needles-30 gauge, 0.5 inches for injections  Four 1-ml tuberculin syringes for injections  One sodium chloride 10 ml vial preservative free  Alcohol swabs     SKIN PREPARATION:  Skin preparation was performed using an alcohol wipe.        AREA/MUSCLE INJECTED:  155 units of Botox  Right upper Trapezius (upper cervical) - 5 units of Botox at 3 site/s.   Left upper Trapezius (upper cervical) - 5 units of Botox at 3 site/s.      Right cervical paraspinals - 5 units of Botox at 2 site/s.   Left cervical paraspinals - 5 units of Botox at 2 site/s.      Left occipitalis - 5 units of Botox at 3 site/s.  Right occipitalis -5 units of Botox at 3 site/s        Right Frontalis - 5 units of Botox at 2 site/s.  Left Frontalis - 5 units of Botox at 2 site/s.     Right Temporalis - 5 units of Botox at 4 site/s.  Left Temporalis - 5 units of Botox at 4 site/s.     Right  - 5 units of Botox at 1 site/s.              Left   - 5 units of Botox at 1 site/s.     Procerus - 5 units of Botox at 1 site/s.     RESPONSE TO PROCEDURE:  tolerated the procedure well and there were no immediate complications.  Patient was allowed to recover for an appropriate period of time and was discharged home in stable condition.     FOLLOW UP:     Repeat Botox injections in 12 weeks        This procedure was performed under a hospital privileging agreement with ROBE Stevenson, [facility]

## 2022-07-04 ENCOUNTER — E-VISIT (OUTPATIENT)
Dept: FAMILY MEDICINE | Facility: CLINIC | Age: 27
End: 2022-07-04
Payer: COMMERCIAL

## 2022-07-04 DIAGNOSIS — J01.90 ACUTE BACTERIAL SINUSITIS: Primary | ICD-10-CM

## 2022-07-04 DIAGNOSIS — B96.89 ACUTE BACTERIAL SINUSITIS: Primary | ICD-10-CM

## 2022-07-04 PROCEDURE — 99421 OL DIG E/M SVC 5-10 MIN: CPT | Performed by: FAMILY MEDICINE

## 2022-07-05 NOTE — PATIENT INSTRUCTIONS
When to Use Antibiotics    Antibiotics are medicines used to treat infections caused by bacteria. They don t work for an illness caused by a virus. And they don't work for an allergic reaction. In fact, taking antibiotics for reasons other than an infection by bacteria can cause problems. You may have side effects from the medicine. And if you need an antibiotic in the future, it may not work well. This is because the bacteria can become immune to the medicine. You can also get a type of diarrhea that's hard to treat. This diarrhea is called C. diff.   When antibiotics likely won t help  Your healthcare provider won t usually give you antibiotics for the conditions listed below. You can help by not asking for them if you have:   A cold. This type of illness is caused by a virus. It can cause a runny nose, stuffed-up nose, sneezing, coughing, and headache. You may also have mild body aches and low fever. A cold gets better on its own in a few days to a week.  The flu (influenza). This is a respiratory illness caused by a virus. The flu usually goes away on its own in a week or so. It can cause fever, body aches, sore throat, and tiredness.  Bronchitis. This is an infection in the lungs. It is most often caused by a virus. You may have coughing, phlegm, body aches, and a low fever. A common type of bronchitis is known as a chest cold. This is called acute bronchitis. This often happens after you have a respiratory infection like a cold. Bronchitis can take weeks to go away. Antibiotics often don t help.  Most sore throats. Sore throats are most often caused by viruses. Your throat may feel scratchy or achy. It may hurt to swallow. You may also have a low fever and body aches. A sore throat usually gets better in a few days.  Most outer ear infections. An ear infection may be caused by a virus or bacteria. It causes pain in the ear. Antibiotics by mouth usually don t help. Low-dose antibiotic ear drops work much  better.  Some inner ear infections. An inner ear infection (otitis media) can be caused by a virus in the ear. It can also cause pain and a high fever. Most older children with low-grade fever don't need to be treated with antibiotics.  Most sinus infections. This is also known as sinusitis. This kind of infection causes sinus pain and swelling, and a runny nose. In most cases, it goes away on its own. Antibiotics don t make recovery quicker.  Allergic rhinitis. This is a set of symptoms caused by an allergic reaction. You may have sneezing, a runny nose, itchy or watery eyes, or a sore throat. Allergies are not treated with antibiotics.  Low fever. A mild fever that s less than 100.4 F (38 C) most likely doesn t need to be treated with antibiotics.   When antibiotics can help  Antibiotics can be used to treat:                                                     Strep throat. This is a throat infection caused by a certain type of bacteria. Symptoms of strep throat include a sore throat, white patches on the tonsils, red spots on the roof of the mouth, fever, body aches, and nausea and vomiting. Strep throat almost never causes a cough.  Urinary tract infection (UTI). This is an infection of the bladder and the tube that takes urine out of the body. It is caused by bacteria. It can cause burning pain and urine that s cloudy or tinted with blood. UTIs are very common. Antibiotics usually help treat them.  Some outer ear infections. In some cases, a healthcare provider may prescribe antibiotics by mouth for an ear infection. You may need a test to show the cause of the ear infection.  Some sinus infections. In some cases, your healthcare provider may give you antibiotics. He or she may first need to make sure your symptoms aren t caused by something else. This may be a virus, fungus, allergies, or air pollutants such as smoke.   Your healthcare provider may give you antibiotics if you have a condition that can affect  your immune system. This includes diabetes or cancer.  Self-care at home  If your infection can t be treated with antibiotics, you can take other steps to feel better. Try the remedies below. In general:   Rest and sleep as much as needed.  Drink water and other clear fluids.  Don t smoke. Stay away from smoke from other people.  Use over-the-counter medicine such as acetaminophen or ibuprofen to ease pain or fever, as directed by your healthcare provider.  To treat sinus pain or nasal stuffiness:  Put a warm, moist cloth on your face where you feel sinus pain or pressure.  Try a nasal spray with medicine or saline. Use as directed by your healthcare provider.  Breathe in steam from a hot shower.  Use a humidifier or cool mist vaporizer.   To quiet a cough:   Use a humidifier or cool mist vaporizer.  Breathe in steam from a hot shower.  Suck on cough lozenges.   To sooth a sore throat:   Suck on ice chips, frozen ice pops, or lozenges.  Use a sore throat spray.  Use a humidifier or cool mist vaporizer.  Gargle with saltwater.  Drink warm liquids.  Take ibuprofen to reduce swelling and pain.  To ease ear pain:   Hold a warm, moist washcloth on the ear for 10 minutes at a time.  Real Time Translation last reviewed this educational content on 12/1/2019 2000-2021 [employer]. All rights reserved. This information is not intended as a substitute for professional medical care. Always follow your healthcare professional's instructions.        Dear Ary Lutz      It is also important to stay well hydrated, get lots of rest and take over-the-counter decongestant,?tylenol?or ibuprofen if you?are able to?take those medications per your primary care provider to help relieve discomfort.?     It is important that you take?all of?your prescribed medication even if your symptoms are improving after a few doses.? Taking?all of?your medicine helps prevent the symptoms from returning.?     If your symptoms worsen, you  develop severe headache, vomiting, high fever (>102), or are not improving in 7 days, please contact your primary care provider for an appointment or visit any of our convenient Walk-in Care or Urgent Care Centers to be seen which can be found on our website?here.?     Thanks again for choosing?us?as your health care partner,?   ?  Shelbie Parada MD?

## 2022-07-14 ENCOUNTER — MYC MEDICAL ADVICE (OUTPATIENT)
Dept: FAMILY MEDICINE | Facility: CLINIC | Age: 27
End: 2022-07-14

## 2022-07-15 ENCOUNTER — TELEPHONE (OUTPATIENT)
Dept: NEUROLOGY | Facility: CLINIC | Age: 27
End: 2022-07-15

## 2022-07-15 ENCOUNTER — MYC MEDICAL ADVICE (OUTPATIENT)
Dept: FAMILY MEDICINE | Facility: CLINIC | Age: 27
End: 2022-07-15

## 2022-07-15 DIAGNOSIS — Z30.41 ENCOUNTER FOR SURVEILLANCE OF CONTRACEPTIVE PILLS: ICD-10-CM

## 2022-07-15 RX ORDER — DROSPIRENONE AND ETHINYL ESTRADIOL 0.02-3(28)
1 KIT ORAL DAILY
Qty: 30 TABLET | Refills: 0 | Status: SHIPPED | OUTPATIENT
Start: 2022-07-15 | End: 2022-07-26

## 2022-07-15 NOTE — TELEPHONE ENCOUNTER
I called pt and left voicemail that her PCP sent in prescription this morning for her birth control pills.     Dunia NARVAEZ

## 2022-07-15 NOTE — TELEPHONE ENCOUNTER
Medication is being filled for 1 time refill only due to:   has apt scheduled next week.   Yu Levine RN

## 2022-07-15 NOTE — TELEPHONE ENCOUNTER
Reason for Call:  Medication or medication refill:    Do you use a [facility]?  Name of the pharmacy and phone number for the current request:    [facility] [address]    Name of the medication requested: drospirenone-ethinyl estradiol (MELA) 3-0.02 MG tablet    Other request: patient only has 1 pill left. Hoping to refill medication today.   Patient has physical scheduled on 7/26/22 w/ provider at [facility] ([place], [place]).     Can we leave a detailed message on this number? YES    Phone number patient can be reached at: Cell number on file:    Telephone Information:   Mobile [phone]       Best Time: any    Call taken on 7/15/2022 at 9:12 AM by Sarai Moralez

## 2022-07-15 NOTE — TELEPHONE ENCOUNTER
[facility] Call Center    Phone Message    May a detailed message be left on voicemail: yes     Reason for Call: Medication Refill Request    Has the patient contacted the pharmacy for the refill? Yes   Name of medication being requested:  Vestura, birth control to help with migraines  Provider who prescribed the medication:  PMD  Pharmacy: [facility] [id number] [place] [address]  Date medication is needed:  Patient called requesting if Coretta will put a refill in for her birthcontrol, pmd is too busy to refill and she is down to one pill. Patient states birth control is to help with her migraines and she is concern of being without for the weekend. Please call back to confirm if provider will prescribe      Action Taken: Message routed to:  [facility]    Travel Screening: Not Applicable

## 2022-07-18 NOTE — TELEPHONE ENCOUNTER
A.S.    Please see [employer] message.  Do you want to do an VV? OV?  Patient did Evisit with you opn 7/4    Emilee Bailey RN

## 2022-07-18 NOTE — TELEPHONE ENCOUNTER
I agree a visit is needed since several issues to discuss.      A VV would be appropriate with any provider available or per her choosing.     Joel Wegener,MD w

## 2022-07-19 DIAGNOSIS — G43.019 INTRACTABLE MIGRAINE WITHOUT AURA AND WITHOUT STATUS MIGRAINOSUS: ICD-10-CM

## 2022-07-19 DIAGNOSIS — F41.0 PANIC ATTACK: ICD-10-CM

## 2022-07-20 ENCOUNTER — TELEPHONE (OUTPATIENT)
Dept: NEUROLOGY | Facility: CLINIC | Age: 27
End: 2022-07-20

## 2022-07-20 RX ORDER — HYDROXYZINE HYDROCHLORIDE 25 MG/1
50 TABLET, FILM COATED ORAL EVERY 8 HOURS PRN
Qty: 60 TABLET | Refills: 0 | Status: SHIPPED | OUTPATIENT
Start: 2022-07-20 | End: 2022-07-26

## 2022-07-20 NOTE — TELEPHONE ENCOUNTER
Medication is being filled for 1 time refill only due to:  Patient needs to be seen because it has been more than one year since last visit.   Due for physical. Last 7/16/21.    Emilee Bailey RN

## 2022-07-20 NOTE — TELEPHONE ENCOUNTER
Prior Authorization Retail Medication Request     Medication/Dose: Rimegepant Sulfate 75 MG TBDP; Route: Take 75 mg by mouth See Admin Instructions Take 75 mg orally every other day - Oral  ICD code (if different than what is on RX):    G43.019  Previously Tried and Failed:  naratriptan, sumatriptan and zolmitriptans, Hydroxyzine and prozac-anxiety/depression  Verapamil 40 mg twice daily but does not help and no difference  Rationale:  Episodic migraine. Recommend nurtec at every other day dosing to help prevent episodic migraine. Pt has 9 headache days per month.      Insurance Name:  preferredone  Insurance ID:  [id number]         Pharmacy Information (if different than what is on RX)  Name:    Phone:

## 2022-07-21 NOTE — TELEPHONE ENCOUNTER
Central Prior Authorization Team   Phone: [phone]      PA Initiation    Medication: Rimegepant Sulfate (NURTEC) 75 MG TBDP  Insurance Company: [facility] [employer] - Phone [phone] Fax [phone]  Pharmacy Filling [id number] [place], [place] [facility] [address]  Filling Pharmacy Phone: [phone]  Filling Pharmacy Fax:    Start Date: 7/21/2022

## 2022-07-25 ASSESSMENT — ENCOUNTER SYMPTOMS
DIZZINESS: 1
BREAST MASS: 0

## 2022-07-26 ENCOUNTER — OFFICE VISIT (OUTPATIENT)
Dept: FAMILY MEDICINE | Facility: CLINIC | Age: 27
End: 2022-07-26
Payer: COMMERCIAL

## 2022-07-26 VITALS
RESPIRATION RATE: 14 BRPM | BODY MASS INDEX: 25.79 KG/M2 | DIASTOLIC BLOOD PRESSURE: 64 MMHG | HEIGHT: 68 IN | TEMPERATURE: 98.3 F | WEIGHT: 170.2 LBS | OXYGEN SATURATION: 95 % | SYSTOLIC BLOOD PRESSURE: 114 MMHG | HEART RATE: 79 BPM

## 2022-07-26 DIAGNOSIS — F41.0 PANIC ATTACK: ICD-10-CM

## 2022-07-26 DIAGNOSIS — Z13.6 CARDIOVASCULAR SCREENING; LDL GOAL LESS THAN 100: Primary | ICD-10-CM

## 2022-07-26 DIAGNOSIS — Z00.00 ENCOUNTER FOR ROUTINE ADULT HEALTH EXAMINATION WITHOUT ABNORMAL FINDINGS: ICD-10-CM

## 2022-07-26 DIAGNOSIS — Z30.41 ENCOUNTER FOR SURVEILLANCE OF CONTRACEPTIVE PILLS: ICD-10-CM

## 2022-07-26 LAB
ANION GAP SERPL CALCULATED.3IONS-SCNC: 3 MMOL/L (ref 3–14)
BASOPHILS # BLD AUTO: 0 10E3/UL (ref 0–0.2)
BASOPHILS NFR BLD AUTO: 0 %
BUN SERPL-MCNC: 12 MG/DL (ref 7–30)
CALCIUM SERPL-MCNC: 9.4 MG/DL (ref 8.5–10.1)
CHLORIDE BLD-SCNC: 104 MMOL/L (ref 94–109)
CHOLEST SERPL-MCNC: 183 MG/DL
CO2 SERPL-SCNC: 28 MMOL/L (ref 20–32)
CREAT SERPL-MCNC: 0.72 MG/DL (ref 0.52–1.04)
EOSINOPHIL # BLD AUTO: 0.1 10E3/UL (ref 0–0.7)
EOSINOPHIL NFR BLD AUTO: 2 %
ERYTHROCYTE [DISTWIDTH] IN BLOOD BY AUTOMATED COUNT: 12.9 % (ref 10–15)
FASTING STATUS PATIENT QL REPORTED: YES
GFR SERPL CREATININE-BSD FRML MDRD: >90 ML/MIN/1.73M2
GLUCOSE BLD-MCNC: 94 MG/DL (ref 70–99)
HCT VFR BLD AUTO: 40.7 % (ref 35–47)
HDLC SERPL-MCNC: 71 MG/DL
HGB BLD-MCNC: 14.1 G/DL (ref 11.7–15.7)
LDLC SERPL CALC-MCNC: 83 MG/DL
LYMPHOCYTES # BLD AUTO: 2 10E3/UL (ref 0.8–5.3)
LYMPHOCYTES NFR BLD AUTO: 31 %
MCH RBC QN AUTO: 28 PG (ref 26.5–33)
MCHC RBC AUTO-ENTMCNC: 34.6 G/DL (ref 31.5–36.5)
MCV RBC AUTO: 81 FL (ref 78–100)
MONOCYTES # BLD AUTO: 0.4 10E3/UL (ref 0–1.3)
MONOCYTES NFR BLD AUTO: 6 %
NEUTROPHILS # BLD AUTO: 3.8 10E3/UL (ref 1.6–8.3)
NEUTROPHILS NFR BLD AUTO: 60 %
NONHDLC SERPL-MCNC: 112 MG/DL
PLATELET # BLD AUTO: 234 10E3/UL (ref 150–450)
POTASSIUM BLD-SCNC: 4.1 MMOL/L (ref 3.4–5.3)
RBC # BLD AUTO: 5.03 10E6/UL (ref 3.8–5.2)
SODIUM SERPL-SCNC: 135 MMOL/L (ref 133–144)
TRIGL SERPL-MCNC: 143 MG/DL
WBC # BLD AUTO: 6.3 10E3/UL (ref 4–11)

## 2022-07-26 PROCEDURE — 36415 COLL VENOUS BLD VENIPUNCTURE: CPT | Performed by: PHYSICIAN ASSISTANT

## 2022-07-26 PROCEDURE — 80048 BASIC METABOLIC PNL TOTAL CA: CPT | Performed by: PHYSICIAN ASSISTANT

## 2022-07-26 PROCEDURE — 85025 COMPLETE CBC W/AUTO DIFF WBC: CPT | Performed by: PHYSICIAN ASSISTANT

## 2022-07-26 PROCEDURE — 80061 LIPID PANEL: CPT | Performed by: PHYSICIAN ASSISTANT

## 2022-07-26 PROCEDURE — 99395 PREV VISIT EST AGE 18-39: CPT | Performed by: PHYSICIAN ASSISTANT

## 2022-07-26 RX ORDER — HYDROXYZINE HYDROCHLORIDE 25 MG/1
50 TABLET, FILM COATED ORAL EVERY 8 HOURS PRN
Qty: 60 TABLET | Refills: 1 | Status: SHIPPED | OUTPATIENT
Start: 2022-07-26 | End: 2022-10-06

## 2022-07-26 RX ORDER — DROSPIRENONE AND ETHINYL ESTRADIOL 0.02-3(28)
KIT ORAL
Qty: 84 TABLET | Refills: 3 | Status: SHIPPED | OUTPATIENT
Start: 2022-07-26 | End: 2022-10-04

## 2022-07-26 ASSESSMENT — ENCOUNTER SYMPTOMS
BREAST MASS: 0
DIZZINESS: 1

## 2022-07-26 ASSESSMENT — PAIN SCALES - GENERAL: PAINLEVEL: NO PAIN (0)

## 2022-07-26 NOTE — PROGRESS NOTES
SUBJECTIVE:   CC: Ary Lutz is an 27 year old woman who presents for preventive health visit.       Patient has been advised of split billing requirements and indicates understanding: Yes  Healthy Habits:     Getting at least 3 servings of Calcium per day:  Yes    Bi-annual eye exam:  NO    Dental care twice a year:  Yes    Sleep apnea or symptoms of sleep apnea:  Sleep apnea    Diet:  Regular (no restrictions)    Frequency of exercise:  4-5 days/week    Duration of exercise:  30-45 minutes    Taking medications regularly:  Yes    Medication side effects:  Lightheadedness    PHQ-2 Total Score: 0    Additional concerns today:  No              Today's PHQ-2 Score:   PHQ-2 ( 1999 Pf[employer]) 7/25/2022   Q1: Little interest or pleasure in doing things 0   Q2: Feeling down, depressed or hopeless 0   PHQ-2 Score 0   PHQ-2 Total Score (12-17 Years)- Positive if 3 or more points; Administer PHQ-A if positive -   Q1: Little interest or pleasure in doing things Not at all   Q2: Feeling down, depressed or hopeless Not at all   PHQ-2 Score 0       Abuse: Current or Past (Physical, Sexual or Emotional) - No  Do you feel safe in your environment? Yes        Social History     Tobacco Use     Smoking status: Never Smoker     Smokeless tobacco: Never Used   Substance Use Topics     Alcohol use: No     Alcohol/week: 0.0 standard drinks         Alcohol Use 7/25/2022   Prescreen: >3 drinks/day or >7 drinks/week? Not Applicable   Prescreen: >3 drinks/day or >7 drinks/week? -       Reviewed orders with patient.  Reviewed health maintenance and updated orders accordingly - Yes  Patient Active Problem List   Diagnosis     SCOTT (generalized anxiety disorder)     Esophageal reflux     Panic attack     Abnormal Pap smear of cervix     Past Surgical History:   Procedure Laterality Date     COLONOSCOPY       ESOPHAGOSCOPY, GASTROSCOPY, DUODENOSCOPY (EGD), COMBINED N/A 9/7/2017    Procedure: COMBINED ESOPHAGOSCOPY, GASTROSCOPY,  DUODENOSCOPY (EGD), BIOPSY SINGLE OR MULTIPLE;  gastroscopy;  Surgeon: Hollis Prather MD;  Location:  GI       Social History     Tobacco Use     Smoking status: Never Smoker     Smokeless tobacco: Never Used   Substance Use Topics     Alcohol use: No     Alcohol/week: 0.0 standard drinks     Family History   Problem Relation Age of Onset     Thyroid Cancer Mother      Hypertension Mother      Hyperlipidemia Father      No Known Problems Sister      No Known Problems Brother      Cerebrovascular Disease Maternal Grandmother      No Known Problems Maternal Grandfather      Colon Cancer Paternal Grandmother         at age 63      No Known Problems Other      Diabetes No family hx of      Coronary Artery Disease No family hx of          Current Outpatient Medications   Medication Sig Dispense Refill     cyclobenzaprine (FLEXERIL) 5 MG tablet Take 1-2 tablets (5-10 mg) by mouth nightly as needed for muscle spasms 20 tablet 0     drospirenone-ethinyl estradiol (MELA) 3-0.02 MG tablet Take 1 tablet daily 84 tablet 3     FLUoxetine (PROZAC) 40 MG capsule TAKE 1 CAPSULE BY MOUTH EVERY DAY 90 capsule 3     hydrOXYzine (ATARAX) 25 MG tablet Take 2 tablets (50 mg) by mouth every 8 hours as needed for itching 60 tablet 1     naratriptan (AMERGE) 2.5 MG tablet Take 1 tablet (2.5 mg) by mouth at onset of headache for migraine May repeat in 4 hours. Max 2 tablets/24 hours. 12 tablet 6     prochlorperazine (COMPAZINE) 5 MG tablet Take 1-2 tablets (5-10 mg) by mouth every 6 hours as needed for nausea or vomiting (migraine) 20 tablet 3     Rimegepant Sulfate 75 MG TBDP Take 75 mg by mouth See Admin Instructions Take 75 mg orally every other day 16 tablet 9     SUMAtriptan (IMITREX STATDOSE) 6 MG/0.5ML pen injector kit Inject 0.5 mLs (6 mg) Subcutaneous at onset of headache for migraine May repeat in 1 hour. Max 12 mg/24 hours. 3 kit 5     Allergies   Allergen Reactions     No Clinical Screening - See Comments      PN: LW  CM1: CONTRAST- nka Reaction :     Sulfamethoxazole W/Trimethoprim Rash and Hives     Other reaction(s): Unknown/Not Verified       Breast Cancer Screening:    Breast CA Risk Assessment (FHS-7) 2021   Do you have a family history of breast, colon, or ovarian cancer? Yes Yes No / Unknown No / Unknown         Patient under 40 years of age: Routine Mammogram Screening not recommended.   Pertinent mammograms are reviewed under the imaging tab.    History of abnormal Pap smear: NO - age 21-29 PAP every 3 years recommended  PAP / HPV 10/30/2020 2018   PAP (Historical) NIL NIL     Reviewed and updated as needed this visit by clinical staff   Tobacco  Allergies  Meds  Problems  Med Hx  Surg Hx  Fam Hx            Reviewed and updated as needed this visit by Provider   Tobacco    Problems  Med Hx  Surg Hx  Fam Hx           Past Medical History:   Diagnosis Date     Abnormal Pap smear of cervix 2020 NIL pap per pt report 18 NIL pap 2020 Abnormal pap per pt report. Unknown result. 10/30/20 NIL pap. Plan: await provider     Anxiety      Chlamydia infection       Past Surgical History:   Procedure Laterality Date     COLONOSCOPY       ESOPHAGOSCOPY, GASTROSCOPY, DUODENOSCOPY (EGD), COMBINED N/A 2017    Procedure: COMBINED ESOPHAGOSCOPY, GASTROSCOPY, DUODENOSCOPY (EGD), BIOPSY SINGLE OR MULTIPLE;  gastroscopy;  Surgeon: Hollis Prather MD;  Location:  GI     OB History    Para Term  AB Living   0 0 0 0 0 0   SAB IAB Ectopic Multiple Live Births   0 0 0 0 0       Review of Systems   Breasts:  Negative for tenderness, breast mass and discharge.   Genitourinary: Negative for pelvic pain, vaginal bleeding and vaginal discharge.   Neurological: Positive for dizziness.          OBJECTIVE:   /64 (BP Location: Left arm, Patient Position: Sitting, Cuff Size: Adult Regular)   Pulse 79   Temp 98.3  F (36.8  C) (Tympanic)   Resp 14    " 1.715 m (5' 7.52\")   Wt 77.2 kg (170 lb 3.2 oz)   LMP 07/11/2022   SpO2 95%   Breastfeeding No   BMI 26.25 kg/m    Physical Exam  GENERAL: healthy, alert and no distress  EYES: Eyes grossly normal to inspection, PERRL and conjunctivae and sclerae normal  HENT: ear canals and TM's normal, nose and mouth without ulcers or lesions  NECK: no adenopathy, no asymmetry, masses, or scars and thyroid normal to palpation  RESP: lungs clear to auscultation - no rales, rhonchi or wheezes  BREAST: normal without masses, tenderness or nipple discharge and no palpable axillary masses or adenopathy  CV: regular rate and rhythm, normal S1 S2, no S3 or S4, no murmur, click or rub, no peripheral edema and peripheral pulses strong  ABDOMEN: soft, nontender, no hepatosplenomegaly, no masses and bowel sounds normal  MS: no gross musculoskeletal defects noted, no edema  SKIN: no suspicious lesions or rashes  NEURO: Normal strength and tone, mentation intact and speech normal  PSYCH: mentation appears normal, affect normal/bright    Diagnostic Test Results:  none     ASSESSMENT/PLAN:     1. Encounter for routine adult health examination without abnormal findings  - REVIEW OF HEALTH MAINTENANCE PROTOCOL ORDERS  - CBC with platelets and differential    2. Encounter for surveillance of contraceptive pills  - drospirenone-ethinyl estradiol (MELA) 3-0.02 MG tablet; Take 1 tablet daily  Dispense: 84 tablet; Refill: 3  - CBC with platelets and differential; Future    3. Panic attack  Stable mood  - hydrOXYzine (ATARAX) 25 MG tablet; Take 2 tablets (50 mg) by mouth every 8 hours as needed for itching  Dispense: 60 tablet; Refill: 1    4. CARDIOVASCULAR SCREENING; LDL GOAL LESS THAN 100  - Lipid Profile (Chol, Trig, HDL, LDL calc); Future  - Basic metabolic panel  (Ca, Cl, CO2, Creat, Gluc, K, Na, BUN); Future  - Lipid Profile (Chol, Trig, HDL, LDL calc)  - Basic metabolic panel  (Ca, Cl, CO2, Creat, Gluc, K, Na, " "BUN)        COUNSELING:  Reviewed preventive health counseling, as reflected in patient instructions       Regular exercise       Healthy diet/nutrition    Estimated body mass index is 26.25 kg/m  as calculated from the following:    Height as of this encounter: 1.715 m (5' 7.52\").    Weight as of this encounter: 77.2 kg (170 lb 3.2 oz).        She reports that she has never smoked. She has never used smokeless tobacco.      Counseling Resources:  ATP IV Guidelines  Pooled Cohorts Equation Calculator  Breast Cancer Risk Calculator  BRCA-Related Cancer Risk Assessment: FHS-7 Tool  FRAX Risk Assessment  ICSI Preventive Guidelines  Dietary Guidelines for Americans, 2010  USDA's MyPlate  ASA Prophylaxis  Lung CA Screening    APOLINAR Alvarado [facility]  "

## 2022-08-02 ENCOUNTER — MYC MEDICAL ADVICE (OUTPATIENT)
Dept: FAMILY MEDICINE | Facility: CLINIC | Age: 27
End: 2022-08-02

## 2022-08-02 NOTE — RESULT ENCOUNTER NOTE
Ary-  Here are your recent results.     Your complete blood count, electrolytes, kidney function and cholesterol are normal at this time    Let me know if you have questions  Yanick Baca PA-C
Awake[employer]

## 2022-08-08 DIAGNOSIS — G43.719 INTRACTABLE CHRONIC MIGRAINE WITHOUT AURA AND WITHOUT STATUS MIGRAINOSUS: ICD-10-CM

## 2022-08-09 RX ORDER — NARATRIPTAN 2.5 MG/1
2.5 TABLET ORAL
Qty: 12 TABLET | Refills: 9 | Status: SHIPPED | OUTPATIENT
Start: 2022-08-09 | End: 2023-10-11

## 2022-08-09 NOTE — TELEPHONE ENCOUNTER
Rx Authorization:  Requested Medication/ Dose naratriptan (AMERGE) 2.5 MG tablet  Date last refill ordered: 8/24/21  Quantity ordered: 12 tablets  # refills: 6  Date of last clinic visit with ordering provider: 5/2/22  Date of next clinic visit with ordering provider:   All pertinent protocol data (lab date/result):   Include pertinent information from patients message:

## 2022-08-12 ENCOUNTER — OFFICE VISIT (OUTPATIENT)
Dept: NEUROLOGY | Facility: CLINIC | Age: 27
End: 2022-08-12
Payer: COMMERCIAL

## 2022-08-12 DIAGNOSIS — G43.719 INTRACTABLE CHRONIC MIGRAINE WITHOUT AURA AND WITHOUT STATUS MIGRAINOSUS: Primary | ICD-10-CM

## 2022-08-12 PROCEDURE — 64615 CHEMODENERV MUSC MIGRAINE: CPT | Performed by: PSYCHIATRY & NEUROLOGY

## 2022-08-12 NOTE — PROGRESS NOTES
[place], [place]  Botulinum Toxin Procedure    Ann Marion MD  Neurology    August 12, 2022    Procedure:  OnabotulinumtoxinA injections for chronic migraine  Indication:  Chronic migraine    Ms. Lutz suffers from severe intractable headaches.  She was referred by Coretta Shea NP for onabotulinumtoxinA injections for headache.  Risks, benefits, and alternatives were discussed.  All questions were answered and consent given.  She decided to proceed with the injections.     Prior to initiation of botulinum toxin injections, Ms. Lutz reported 16-17 headache days per month, with 10 severe headache days per month. Her headaches are quite disabling and often interfere with her ability to function normally.    Ms. Lutz reports 12-14 headache days per month currently, with 4-5 severe headache days per month.  She has noticed a wearing off phenomenon prior to this round of botulinum toxin injections, lasting 1-2 weeks.    She has attempted other migraine prophylactic treatments in the past, which have included: verapamil, topiramate, fluoxetine, gabapentin.  Beta blocker has been avoided due to depression hx.     She currently takes fluoxetine.    Ms. Lutz's pain was assessed prior to the procedure.  She rated her pain today as 0 out of 10.    Procedural Pause: Procedural pause was conducted to verify correct patient identity, procedure to be performed, correct side and site, correct patient position, and special requirements. Appropriate hand hygiene was utilized, and each injection site was prepped with alcohol wipes or Chloraprep swab.     Procedure Details: 200 units of onabotulinumtoxinA was diluted in 4 mL 0.9% normal saline. A total of 150 units of onabotulinumtoxinA were injected using 30 gauge 0.5 in needles into the muscles listed below. 50 units of onabotulinumtoxinA were wasted.     Injection Sites: Total = 150 units onabotulinumtoxinA      and  Procerus muscles - 5 units into the left and right corrugators and 5 units into the procerus (15 units total)    Frontalis muscles - 5 units into the left superior frontalis and 5 units into the right superior frontalis (2 injection sites per muscle) (10 units total)    Temporalis muscles - 12.5 units into the left temporalis muscle and 12.5 units into the right temporalis muscle (2 injection sites per muscle) (25 units total)    Occipitalis muscles - 12.5 units into the left occipitalis muscle and 12.5 units into the right occipitalis muscle (2 injection sites per muscle) (25 units total)    Splenius Capitis muscles - 12.5 units into the left splenius capitis muscle and 12.5 units into the right splenius capitis muscle (2 injection sites per muscle, divided into 2/3 anteriorly and 1/3 posteriorly) (25 units total)      Trapezius muscles - 25 units into the left trapezius muscle and 25 units into the right trapezius muscle (3 injection sites per muscle, divided 5 units, 10 units, 10 units, medial to lateral) (50 units total)    Ms. Lutz tolerated the procedure well without immediate complications.  She will follow up in clinic for assessment of the effectiveness of treatment.  She did not report any change in her pain level after the botulinumtoxinA injection procedure.    Ann Marion MD    Neurology Department  [facility] [place] [id number]

## 2022-08-12 NOTE — LETTER
8/12/2022         RE: Ary Lutz  [address] [facility] [address]        Dear Colleague,    Thank you for referring your patient, Ary Lutz, to the [facility] NEUROLOGY CLINICS [facility]. Please see a copy of my visit note below.    [place], [place]  Botulinum Toxin Procedure    Ann Marion MD  Neurology    August 12, 2022    Procedure:  OnabotulinumtoxinA injections for chronic migraine  Indication:  Chronic migraine    Ms. Lutz suffers from severe intractable headaches.  She was referred by Coretta Shea NP for onabotulinumtoxinA injections for headache.  Risks, benefits, and alternatives were discussed.  All questions were answered and consent given.  She decided to proceed with the injections.     Prior to initiation of botulinum toxin injections, Ms. Lutz reported 16-17 headache days per month, with 10 severe headache days per month. Her headaches are quite disabling and often interfere with her ability to function normally.    Ms. Lutz reports 12-14 headache days per month currently, with 4-5 severe headache days per month.  She has noticed a wearing off phenomenon prior to this round of botulinum toxin injections, lasting 1-2 weeks.    She has attempted other migraine prophylactic treatments in the past, which have included: verapamil, topiramate, fluoxetine, gabapentin.  Beta blocker has been avoided due to depression hx.     She currently takes fluoxetine.    Ms. Lutz's pain was assessed prior to the procedure.  She rated her pain today as 0 out of 10.    Procedural Pause: Procedural pause was conducted to verify correct patient identity, procedure to be performed, correct side and site, correct patient position, and special requirements. Appropriate hand hygiene was utilized, and each injection site was prepped with alcohol wipes or Chloraprep swab.     Procedure Details: 200 units of onabotulinumtoxinA was diluted in 4 mL  0.9% normal saline. A total of 150 units of onabotulinumtoxinA were injected using 30 gauge 0.5 in needles into the muscles listed below. 50 units of onabotulinumtoxinA were wasted.     Injection Sites: Total = 150 units onabotulinumtoxinA      and Procerus muscles - 5 units into the left and right corrugators and 5 units into the procerus (15 units total)    Frontalis muscles - 5 units into the left superior frontalis and 5 units into the right superior frontalis (2 injection sites per muscle) (10 units total)    Temporalis muscles - 12.5 units into the left temporalis muscle and 12.5 units into the right temporalis muscle (2 injection sites per muscle) (25 units total)    Occipitalis muscles - 12.5 units into the left occipitalis muscle and 12.5 units into the right occipitalis muscle (2 injection sites per muscle) (25 units total)    Splenius Capitis muscles - 12.5 units into the left splenius capitis muscle and 12.5 units into the right splenius capitis muscle (2 injection sites per muscle, divided into 2/3 anteriorly and 1/3 posteriorly) (25 units total)      Trapezius muscles - 25 units into the left trapezius muscle and 25 units into the right trapezius muscle (3 injection sites per muscle, divided 5 units, 10 units, 10 units, medial to lateral) (50 units total)    Ms. Lutz tolerated the procedure well without immediate complications.  She will follow up in clinic for assessment of the effectiveness of treatment.  She did not report any change in her pain level after the botulinumtoxinA injection procedure.    Ann Marion MD    Neurology Department  [facility] [place] [address]        Again, thank you for allowing me to participate in the care of your patient.        Sincerely,        Ann Marion MD

## 2022-08-18 ENCOUNTER — MYC MEDICAL ADVICE (OUTPATIENT)
Dept: FAMILY MEDICINE | Facility: CLINIC | Age: 27
End: 2022-08-18

## 2022-08-19 NOTE — TELEPHONE ENCOUNTER
Please see My Chart Message and advise. Would you like patient to schedule a virtual to discuss?  Triage to contact the patient.  Shae Johnston RN

## 2022-08-24 ENCOUNTER — TELEPHONE (OUTPATIENT)
Dept: NEUROLOGY | Facility: CLINIC | Age: 27
End: 2022-08-24

## 2022-08-24 NOTE — TELEPHONE ENCOUNTER
I called pt to offer sooner video visit with Coretta; held 1p today 8/24 with Coretta. Asked pt to call if she would like to take this spot.     Dunia NARVAEZ

## 2022-09-11 ENCOUNTER — HEALTH MAINTENANCE LETTER (OUTPATIENT)
Age: 27
End: 2022-09-11

## 2022-10-04 ENCOUNTER — OFFICE VISIT (OUTPATIENT)
Dept: FAMILY MEDICINE | Facility: CLINIC | Age: 27
End: 2022-10-04
Payer: COMMERCIAL

## 2022-10-04 VITALS
TEMPERATURE: 98.2 F | OXYGEN SATURATION: 96 % | BODY MASS INDEX: 23.63 KG/M2 | SYSTOLIC BLOOD PRESSURE: 112 MMHG | WEIGHT: 153.2 LBS | RESPIRATION RATE: 16 BRPM | DIASTOLIC BLOOD PRESSURE: 66 MMHG | HEART RATE: 73 BPM

## 2022-10-04 DIAGNOSIS — G43.109 MIGRAINE WITH AURA AND WITHOUT STATUS MIGRAINOSUS, NOT INTRACTABLE: ICD-10-CM

## 2022-10-04 DIAGNOSIS — F41.1 GAD (GENERALIZED ANXIETY DISORDER): Primary | ICD-10-CM

## 2022-10-04 DIAGNOSIS — Z30.011 ENCOUNTER FOR INITIAL PRESCRIPTION OF CONTRACEPTIVE PILLS: ICD-10-CM

## 2022-10-04 PROCEDURE — 99214 OFFICE O/P EST MOD 30 MIN: CPT | Performed by: PHYSICIAN ASSISTANT

## 2022-10-04 RX ORDER — ACETAMINOPHEN AND CODEINE PHOSPHATE 120; 12 MG/5ML; MG/5ML
0.35 SOLUTION ORAL DAILY
Qty: 90 TABLET | Refills: 3 | Status: SHIPPED | OUTPATIENT
Start: 2022-10-04 | End: 2023-09-22

## 2022-10-04 ASSESSMENT — PAIN SCALES - GENERAL: PAINLEVEL: NO PAIN (0)

## 2022-10-04 NOTE — PROGRESS NOTES
Assessment & Plan     Migraine with aura and without status migrainosus, not intractable  Discussed different options for birth control.  She would like to start minipill.  Uses and SE discussed and understood by patient. Advised that she discuss her migraine pan with neurology if/wehn she wants to being trying to conceive.     SCOTT (generalized anxiety disorder)  Controlled.  She will reach out in 4-8 weeks with an update of how she is doing.  If mood is stable celestino consider reducing medication at that time.     Encounter for initial prescription of contraceptive pills  - norethindrone (MICRONOR) 0.35 MG tablet; Take 1 tablet (0.35 mg) by mouth daily        Return in about 4 weeks (around 11/1/2022) for [employer] message me with an update in 1-2 months.    APOLINAR Alvarado [facility]    Carlitos Kunz is a 27 year old, presenting for the following health issues:  Recheck Medication (Birth control and anxiety)      History of Present Illness       Reason for visit:  Birth control/anxiety meds.    She eats 2-3 servings of fruits and vegetables daily.She consumes 0 sweetened beverage(s) daily.She exercises with enough effort to increase her heart rate 60 or more minutes per day.  She exercises with enough effort to increase her heart rate 6 days per week.   She is taking medications regularly.       Joaquina presents to the clinic to discuss different birth control options.  She has been taking Joi since age 16.  She has migraine and has been experiencing auras and s her neurologist recommended that she discontinue LAZARO and move to progesterone based product. She is considering pregnancy in 1-2 years.      Her anxiety has been very well controlled on Prozac 0 mg, considering reducing the dose.     Review of Systems   Constitutional, HEENT, cardiovascular, pulmonary, GI, , musculoskeletal, neuro, skin, endocrine and psych systems are negative, except as otherwise noted.       Objective    /66 (BP Location: Left arm, Patient Position: Sitting, Cuff Size: Adult Regular)   Pulse 73   Temp 98.2  F (36.8  C) (Tympanic)   Resp 16   Wt 69.5 kg (153 lb 3.2 oz)   LMP 09/03/2022   SpO2 96%   Breastfeeding No   BMI 23.63 kg/m    Body mass index is 23.63 kg/m .  Physical Exam   GENERAL: healthy, alert and no distress  RESP: lungs clear to auscultation - no rales, rhonchi or wheezes  CV: regular rate and rhythm, normal S1 S2, no S3 or S4, no murmur, click or rub, no peripheral edema and peripheral pulses strong  PSYCH: mentation appears normal, affect normal/bright

## 2022-10-05 ENCOUNTER — MYC MEDICAL ADVICE (OUTPATIENT)
Dept: FAMILY MEDICINE | Facility: CLINIC | Age: 27
End: 2022-10-05

## 2022-10-05 DIAGNOSIS — F41.0 PANIC ATTACK: ICD-10-CM

## 2022-10-06 RX ORDER — HYDROXYZINE HYDROCHLORIDE 25 MG/1
50 TABLET, FILM COATED ORAL EVERY 8 HOURS PRN
Qty: 60 TABLET | Refills: 0 | Status: SHIPPED | OUTPATIENT
Start: 2022-10-06 | End: 2022-11-15

## 2022-10-06 NOTE — TELEPHONE ENCOUNTER
Prescription approved per [facility] Refill Protocol.  Catherine Sorenson RN  [facility]

## 2022-10-20 ENCOUNTER — VIRTUAL VISIT (OUTPATIENT)
Dept: NEUROLOGY | Facility: CLINIC | Age: 27
End: 2022-10-20
Payer: COMMERCIAL

## 2022-10-20 DIAGNOSIS — G43.719 INTRACTABLE CHRONIC MIGRAINE WITHOUT AURA AND WITHOUT STATUS MIGRAINOSUS: Primary | ICD-10-CM

## 2022-10-20 PROCEDURE — 99214 OFFICE O/P EST MOD 30 MIN: CPT | Mod: 95 | Performed by: NURSE PRACTITIONER

## 2022-10-20 ASSESSMENT — HEADACHE IMPACT TEST (HIT 6)
HIT6 TOTAL SCORE: 62
HOW OFTEN DO HEADACHES LIMIT YOUR DAILY ACTIVITIES: SOMETIMES
HOW OFTEN HAVE YOU FELT FED UP OR IRRITATED BECAUSE OF YOUR HEADACHES: VERY OFTEN
WHEN YOU HAVE HEADACHES HOW OFTEN IS THE PAIN SEVERE: SOMETIMES
WHEN YOU HAVE A HEADACHE HOW OFTEN DO YOU WISH YOU COULD LIE DOWN: VERY OFTEN
HOW OFTEN HAVE YOU FELT TOO TIRED TO WORK BECAUSE OF YOUR HEADACHES: SOMETIMES
HOW OFTEN DID HEADACHS LIMIT CONCENTRATION ON WORK OR DAILY ACTIVITY: SOMETIMES

## 2022-10-20 ASSESSMENT — MIGRAINE DISABILITY ASSESSMENT (MIDAS)
HOW MANY DAYS WAS YOUR PRODUCTIVITY CUT IN HALF BECAUSE OF HEADACHES: 3
HOW OFTEN WERE SOCIAL ACTIVITIES MISSED DUE TO HEADACHES: 2
HOW MANY DAYS DID YOU NOT DO HOUSEWORK BECAUSE OF HEADACHES: 5
HOW MANY DAYS IN THE PAST 3 MONTHS HAVE YOU HAD A HEADACHE: 45
HOW MANY DAYS WAS HOUSEWORK PRODUCTIVITY CUT IN HALF DUE TO HEADACHES: 5
ON A SCALE FROM 0-10 ON AVERAGE HOW PAINFUL WERE HEADACHES: 6
HOW MANY DAYS DID YOU MISS WORK OR SCHOOL BECAUSE OF HEADACHES: 3
TOTAL SCORE: 18

## 2022-10-20 NOTE — LETTER
10/20/2022       RE: Ary Lutz  [address] [facility] [address]     Dear Colleague,    Thank you for referring your patient, Ary Lutz, to the [facility] NEUROLOGY CLINIC [place] [facility]. Please see a copy of my visit note below.    Joaquina is a 27 year old who is being evaluated via a billable video visit.      How would you like to obtain your AVS? MyChart  If the video visit is dropped, the invitation should be resent by: Text to cell phone: [phone]  Will anyone else be joining your video visit? No        Video-Visit Details    Video Start Time: 3:45 PM    Type of service:  Video Visit    Video End Time:    Originating Location (pt. Location): Home      Distant Location (provider location):  On-site    Platform used for Video Visit: [facility] follow up:  OCP and migraine headaches discussion    Headaches about 12/months and treatment helps with Nurtec every other day and naratriptan as needed. Next Botox in about a month.   Botox has been helping by 30-40% -last Botox August 12th with 155 units. Will see if insurance approves a higher dose of Botox to optimize Botox dose   PT to help with headache and neck treatment     Migraine headaches and not sure if she gets aura and does not noted anything. OCP and worries switching over because of weight gain, acne but worries about stroke   No family history of clotting or stroke early in life, does not smoke, no alcohol, no DM, no high BP, exercise a lot.     Per our PharmD, The risk of stroke is more of a concern in people who have migraine with aura. If you have aura with your migraine attacks, it is best to avoid estrogen altogether and take a progestin-form of birth control. The risk of blood clots in general is relatively low with combination birth control unless you have other risk factors (clotting disorder, currently smoking etc).  Patient is aware  that although the risk is low it is not zero and she would need to except the risk and decision is up to the patient.    Patient sounds alert and no in apparent acute distress,  mentation appears normal, judgement and insight intact, normal speech.    I discussed all my recommendations with Ary Lutz who verbalizes understanding and comfortable with the plan.  All of patient's questions were answered from the best of my knowledge.  Patient is in agreement with the plan.     30 minutes spent on the date of the encounter doing video access, chart  review,  meds review, treatment plan, documentation and further activities as noted above        Again, thank you for allowing me to participate in the care of your patient.      Sincerely,    ROBE Vargas CNP

## 2022-10-20 NOTE — PROGRESS NOTES
Joaquina is a 27 year old who is being evaluated via a billable video visit.      How would you like to obtain your AVS? MyChart  If the video visit is dropped, the invitation should be resent by: Text to cell phone: [phone]  Will anyone else be joining your video visit? No        Video-Visit Details    Video Start Time: 3:45 PM    Type of service:  Video Visit    Video End Time:    Originating Location (pt. Location): Home      Distant Location (provider location):  On-site    Platform used for Video Visit: [facility] follow up:  OCP and migraine headaches discussion    Headaches about 12/months and treatment helps with Nurtec every other day and naratriptan as needed. Next Botox in about a month.   Botox has been helping by 30-40% -last Botox August 12th with 155 units. Will see if insurance approves a higher dose of Botox to optimize Botox dose   PT to help with headache and neck treatment     Migraine headaches and not sure if she gets aura and does not noted anything. OCP and worries switching over because of weight gain, acne but worries about stroke   No family history of clotting or stroke early in life, does not smoke, no alcohol, no DM, no high BP, exercise a lot.     Per our PharmD, The risk of stroke is more of a concern in people who have migraine with aura. If you have aura with your migraine attacks, it is best to avoid estrogen altogether and take a progestin-form of birth control. The risk of blood clots in general is relatively low with combination birth control unless you have other risk factors (clotting disorder, currently smoking etc).  Patient is aware that although the risk is low it is not zero and she would need to except the risk and decision is up to the patient.    Patient sounds alert and no in apparent acute distress,  mentation appears normal, judgement and insight intact, normal speech.    I discussed all my recommendations with Ary Lutz who  verbalizes understanding and comfortable with the plan.  All of patient's questions were answered from the best of my knowledge.  Patient is in agreement with the plan.     30 minutes spent on the date of the encounter doing video access, chart  review,  meds review, treatment plan, documentation and further activities as noted above    ROBE Urbina, CNP [facility]  Headache certified  [facility]

## 2022-11-02 ENCOUNTER — E-VISIT (OUTPATIENT)
Dept: FAMILY MEDICINE | Facility: CLINIC | Age: 27
End: 2022-11-02
Payer: COMMERCIAL

## 2022-11-02 DIAGNOSIS — B96.89 ACUTE BACTERIAL SINUSITIS: Primary | ICD-10-CM

## 2022-11-02 DIAGNOSIS — J01.90 ACUTE BACTERIAL SINUSITIS: Primary | ICD-10-CM

## 2022-11-02 PROCEDURE — 99421 OL DIG E/M SVC 5-10 MIN: CPT | Performed by: PHYSICIAN ASSISTANT

## 2022-11-09 ENCOUNTER — OFFICE VISIT (OUTPATIENT)
Dept: NEUROLOGY | Facility: CLINIC | Age: 27
End: 2022-11-09
Payer: COMMERCIAL

## 2022-11-09 ENCOUNTER — TELEPHONE (OUTPATIENT)
Dept: NEUROLOGY | Facility: CLINIC | Age: 27
End: 2022-11-09

## 2022-11-09 DIAGNOSIS — M79.18 CHRONIC MYOFASCIAL PAIN: ICD-10-CM

## 2022-11-09 DIAGNOSIS — G89.29 CHRONIC MYOFASCIAL PAIN: ICD-10-CM

## 2022-11-09 DIAGNOSIS — G43.719 INTRACTABLE CHRONIC MIGRAINE WITHOUT AURA AND WITHOUT STATUS MIGRAINOSUS: Primary | ICD-10-CM

## 2022-11-09 PROCEDURE — 99207 PR SATISFY VISIT NUMBER: CPT | Performed by: NURSE PRACTITIONER

## 2022-11-09 PROCEDURE — 64615 CHEMODENERV MUSC MIGRAINE: CPT | Performed by: NURSE PRACTITIONER

## 2022-11-09 ASSESSMENT — HEADACHE IMPACT TEST (HIT 6)
HOW OFTEN DO HEADACHES LIMIT YOUR DAILY ACTIVITIES: SOMETIMES
WHEN YOU HAVE A HEADACHE HOW OFTEN DO YOU WISH YOU COULD LIE DOWN: VERY OFTEN
HIT6 TOTAL SCORE: 62
HOW OFTEN DID HEADACHS LIMIT CONCENTRATION ON WORK OR DAILY ACTIVITY: SOMETIMES
HOW OFTEN HAVE YOU FELT FED UP OR IRRITATED BECAUSE OF YOUR HEADACHES: VERY OFTEN
WHEN YOU HAVE HEADACHES HOW OFTEN IS THE PAIN SEVERE: SOMETIMES
HOW OFTEN HAVE YOU FELT TOO TIRED TO WORK BECAUSE OF YOUR HEADACHES: SOMETIMES

## 2022-11-09 NOTE — TELEPHONE ENCOUNTER
Called and spoke to patient about scheduling appt. With Dr. Marion. Being that the schedule was pretty full patient wcb once she decides if she truly wants to change providers. For now, no appointment needed.

## 2022-11-09 NOTE — LETTER
2022       RE: Ary Lutz  [address] [facility] [address]     Dear Colleague,    Thank you for referring your patient, Ary Lutz, to the [facility] [place] at [facility]. Please see a copy of my visit note below.    [facility] [place]  [address]  3RD FLOOR  [facility] [address] [phone]  Dept: [phone]  ______________________________________________________________________________     INVASIVE PROCEDURE SAFETY CHECKLIST     Date:2022  Procedure:Botox injections with chronic migraine headache protocol  Patient Name: Ary Lutz  MRN: [id number]  YOB: 1995     Action: Complete sections as appropriate. Any discrepancy results in a HARD COPY until resolved.      PRE PROCEDURE:  Patient ID verified with 2 identifiers (name and  or MRN): Yes  Procedure and site verified with patient/designee (when able): Yes  Accurate consent documentation in medical record: Yes  H&P (or appropriate assessment) documented in medical record: Yes  H&P must be up to 20 days prior to procedure and updates within 24 hours of procedure as applicable: NA  Relevant diagnostic and radiology test results appropriately labeled and displayed as applicable: NA  Procedure site(s) marked with provider initials: NA     TIMEOUT:  Time-Out performed immediately prior to starting procedure, including verbal and active participation of all team members addressing the following:Yes  * Correct patient identify  * Confirmed that the correct side and site are marked  * An accurate procedure consent form  * Agreement on the procedure to be done  * Correct patient position  * Relevant images and results are properly labeled and appropriately displayed  * The need to administer antibiotics or fluids for irrigation purposes during the procedure as applicable   * Safety precautions  based on patient history or medication use     DURING PROCEDURE: Verification of correct person, site, and procedures any time the responsibility for care of the patient is transferred to another member of the care team.      PROCEDURE NOTE:     BOTULINUM NEUROTOXIN INJECTION PROCEDURE:  DATA:11/9/2022  INDICATIONS FOR PROCEDURES:   chronic migraine headaches.    baseline symptoms have been recalcitrant to oral medications and conservative therapy. Patient is here today for a repeat  injection with Botox.No problems reported since last Botox visit on   Wearing off a couple weeks  Decreased headaches by 50% but still has about 14 headaches per month-naratriptan and nurtec work  Pre-injection -6/10   Post Botox injections-4-5/10  Shoulder muscle tighness contributing to headaches and recommended a trial of PT to help with myofascial pain and migraine headaches in addition to treatment with Botox.        GOAL OF PROCEDURE:  The goal of this procedure is to decrease pain and enhance functional independence.     TOTAL DOSE ADMINISTERED:  Dose Administered:  165 units  Botox (Botulinum Toxin Type A)       2:1 Dilution    See MAR  Wasted 45 units  Medication guide was given to patient     CONSENT:  The risks, benefits, and treatment options were discussed with the patient who agreed to proceed.     Written consent was obtained      EQUIPMENT USED:  Needles-30 gauge, 0.5 inches for injections  Four 1-ml tuberculin syringes for injections  One sodium chloride 10 ml vial preservative free  Alcohol swabs     SKIN PREPARATION:  Skin preparation was performed using an alcohol wipe.        AREA/MUSCLE INJECTED:  165 units of Botox  Right upper Trapezius (upper cervical) - 5 units of Botox at 3 site/s.   Left upper Trapezius (upper cervical) - 5 units of Botox at 3 site/s.      Right cervical paraspinals - 5 units of Botox at 2 site/s.   Left cervical paraspinals - 5 units of Botox at 2 site/s.      Left occipitalis - 5 units of  Botox at 3 site/s.  Right occipitalis -5 units of Botox at 3 site/s        Right Frontalis - 5 units of Botox at 2 site/s.  Left Frontalis - 5 units of Botox at 2 site/s.     Right Temporalis - 5 units of Botox at 5 site/s.  Left Temporalis - 5 units of Botox at 5 site/s.     Right  - 5 units of Botox at 1 site/s.              Left  - 5 units of Botox at 1 site/s.     Procerus - 5 units of Botox at 1 site/s.     RESPONSE TO PROCEDURE:  tolerated the procedure well and there were no immediate complications.  Patient was allowed to recover for an appropriate period of time and was discharged home in stable condition.     FOLLOW UP:     Repeat Botox injections in 12 weeks  PT referral      This procedure was performed under a hospital privileging agreement with Dr. Whipple            Again, thank you for allowing me to participate in the care of your patient.      Sincerely,    ROBE Vargas CNP

## 2022-11-09 NOTE — PROGRESS NOTES
[facility] [address] [address] [phone]  Dept: [phone]  ______________________________________________________________________________     INVASIVE PROCEDURE SAFETY CHECKLIST     Date:2022  Procedure:Botox injections with chronic migraine headache protocol  Patient Name: Ary Lutz  MRN: [id number]  YOB: 1995     Action: Complete sections as appropriate. Any discrepancy results in a HARD COPY until resolved.      PRE PROCEDURE:  Patient ID verified with 2 identifiers (name and  or MRN): Yes  Procedure and site verified with patient/designee (when able): Yes  Accurate consent documentation in medical record: Yes  H&P (or appropriate assessment) documented in medical record: Yes  H&P must be up to 20 days prior to procedure and updates within 24 hours of procedure as applicable: NA  Relevant diagnostic and radiology test results appropriately labeled and displayed as applicable: NA  Procedure site(s) marked with provider initials: NA     TIMEOUT:  Time-Out performed immediately prior to starting procedure, including verbal and active participation of all team members addressing the following:Yes  * Correct patient identify  * Confirmed that the correct side and site are marked  * An accurate procedure consent form  * Agreement on the procedure to be done  * Correct patient position  * Relevant images and results are properly labeled and appropriately displayed  * The need to administer antibiotics or fluids for irrigation purposes during the procedure as applicable   * Safety precautions based on patient history or medication use     DURING PROCEDURE: Verification of correct person, site, and procedures any time the responsibility for care of the patient is transferred to another member of the care team.      PROCEDURE NOTE:     BOTULINUM NEUROTOXIN INJECTION PROCEDURE:  DATA:2022  INDICATIONS FOR  PROCEDURES:   chronic migraine headaches.    baseline symptoms have been recalcitrant to oral medications and conservative therapy. Patient is here today for a repeat  injection with Botox.No problems reported since last Botox visit on   Wearing off a couple weeks  Decreased headaches by 50% but still has about 14 headaches per month-naratriptan and nurtec work  Pre-injection -6/10   Post Botox injections-4-5/10  Shoulder muscle tighness contributing to headaches and recommended a trial of PT to help with myofascial pain and migraine headaches in addition to treatment with Botox.        GOAL OF PROCEDURE:  The goal of this procedure is to decrease pain and enhance functional independence.     TOTAL DOSE ADMINISTERED:  Dose Administered:  165 units  Botox (Botulinum Toxin Type A)       2:1 Dilution    See MAR  Wasted 45 units  Medication guide was given to patient     CONSENT:  The risks, benefits, and treatment options were discussed with the patient who agreed to proceed.     Written consent was obtained      EQUIPMENT USED:  Needles-30 gauge, 0.5 inches for injections  Four 1-ml tuberculin syringes for injections  One sodium chloride 10 ml vial preservative free  Alcohol swabs     SKIN PREPARATION:  Skin preparation was performed using an alcohol wipe.        AREA/MUSCLE INJECTED:  165 units of Botox  Right upper Trapezius (upper cervical) - 5 units of Botox at 3 site/s.   Left upper Trapezius (upper cervical) - 5 units of Botox at 3 site/s.      Right cervical paraspinals - 5 units of Botox at 2 site/s.   Left cervical paraspinals - 5 units of Botox at 2 site/s.      Left occipitalis - 5 units of Botox at 3 site/s.  Right occipitalis -5 units of Botox at 3 site/s        Right Frontalis - 5 units of Botox at 2 site/s.  Left Frontalis - 5 units of Botox at 2 site/s.     Right Temporalis - 5 units of Botox at 5 site/s.  Left Temporalis - 5 units of Botox at 5 site/s.     Right  - 5 units of Botox at 1  site/s.              Left  - 5 units of Botox at 1 site/s.     Procerus - 5 units of Botox at 1 site/s.     RESPONSE TO PROCEDURE:  tolerated the procedure well and there were no immediate complications.  Patient was allowed to recover for an appropriate period of time and was discharged home in stable condition.     FOLLOW UP:     Repeat Botox injections in 12 weeks  PT referral      This procedure was performed under a hospital privileging agreement with Dr. Sarabjit Shea, [facility]

## 2022-11-11 DIAGNOSIS — F41.0 PANIC ATTACK: ICD-10-CM

## 2022-11-14 NOTE — TELEPHONE ENCOUNTER
Routing refill request to provider for review/approval because:  Different PCP listed but pt more recently saw [facility] for physical. Is provider OK continuing to refill at this time?  Janelle COHEN RN  [facility]

## 2022-11-15 RX ORDER — HYDROXYZINE HYDROCHLORIDE 25 MG/1
50 TABLET, FILM COATED ORAL EVERY 8 HOURS PRN
Qty: 60 TABLET | Refills: 0 | Status: SHIPPED | OUTPATIENT
Start: 2022-11-15 | End: 2023-07-04

## 2022-11-27 ENCOUNTER — MYC MEDICAL ADVICE (OUTPATIENT)
Dept: FAMILY MEDICINE | Facility: CLINIC | Age: 27
End: 2022-11-27

## 2022-11-28 ENCOUNTER — MYC MEDICAL ADVICE (OUTPATIENT)
Dept: FAMILY MEDICINE | Facility: CLINIC | Age: 27
End: 2022-11-28

## 2022-11-28 ENCOUNTER — OFFICE VISIT (OUTPATIENT)
Dept: FAMILY MEDICINE | Facility: CLINIC | Age: 27
End: 2022-11-28
Payer: COMMERCIAL

## 2022-11-28 VITALS
RESPIRATION RATE: 16 BRPM | WEIGHT: 153.2 LBS | HEART RATE: 81 BPM | SYSTOLIC BLOOD PRESSURE: 109 MMHG | BODY MASS INDEX: 23.22 KG/M2 | DIASTOLIC BLOOD PRESSURE: 76 MMHG | OXYGEN SATURATION: 98 % | HEIGHT: 68 IN | TEMPERATURE: 98.5 F

## 2022-11-28 DIAGNOSIS — D50.9 IRON DEFICIENCY ANEMIA, UNSPECIFIED IRON DEFICIENCY ANEMIA TYPE: ICD-10-CM

## 2022-11-28 DIAGNOSIS — R42 LIGHTHEADEDNESS: Primary | ICD-10-CM

## 2022-11-28 DIAGNOSIS — J01.90 ACUTE BACTERIAL SINUSITIS: ICD-10-CM

## 2022-11-28 DIAGNOSIS — H65.90 MIDDLE EAR EFFUSION, UNSPECIFIED LATERALITY: Primary | ICD-10-CM

## 2022-11-28 DIAGNOSIS — R42 LIGHTHEADEDNESS: ICD-10-CM

## 2022-11-28 DIAGNOSIS — F41.1 GAD (GENERALIZED ANXIETY DISORDER): ICD-10-CM

## 2022-11-28 DIAGNOSIS — B96.89 ACUTE BACTERIAL SINUSITIS: ICD-10-CM

## 2022-11-28 LAB
ERYTHROCYTE [DISTWIDTH] IN BLOOD BY AUTOMATED COUNT: 13.2 % (ref 10–15)
HCT VFR BLD AUTO: 40 % (ref 35–47)
HGB BLD-MCNC: 13.6 G/DL (ref 11.7–15.7)
MCH RBC QN AUTO: 27.9 PG (ref 26.5–33)
MCHC RBC AUTO-ENTMCNC: 34 G/DL (ref 31.5–36.5)
MCV RBC AUTO: 82 FL (ref 78–100)
PLATELET # BLD AUTO: 217 10E3/UL (ref 150–450)
RBC # BLD AUTO: 4.87 10E6/UL (ref 3.8–5.2)
WBC # BLD AUTO: 7.1 10E3/UL (ref 4–11)

## 2022-11-28 PROCEDURE — 82728 ASSAY OF FERRITIN: CPT | Performed by: PHYSICIAN ASSISTANT

## 2022-11-28 PROCEDURE — 36415 COLL VENOUS BLD VENIPUNCTURE: CPT | Performed by: PHYSICIAN ASSISTANT

## 2022-11-28 PROCEDURE — 99214 OFFICE O/P EST MOD 30 MIN: CPT | Performed by: PHYSICIAN ASSISTANT

## 2022-11-28 PROCEDURE — 85027 COMPLETE CBC AUTOMATED: CPT | Performed by: PHYSICIAN ASSISTANT

## 2022-11-28 RX ORDER — FLUOXETINE 40 MG/1
40 CAPSULE ORAL DAILY
Qty: 90 CAPSULE | Refills: 3 | Status: SHIPPED | OUTPATIENT
Start: 2022-11-28 | End: 2023-12-11

## 2022-11-28 ASSESSMENT — PAIN SCALES - GENERAL: PAINLEVEL: NO PAIN (0)

## 2022-11-28 NOTE — PROGRESS NOTES
"  Assessment & Plan   Problem List Items Addressed This Visit        Behavioral    SCOTT (generalized anxiety disorder)    Relevant Medications    FLUoxetine (PROZAC) 20 MG capsule    FLUoxetine (PROZAC) 40 MG capsule   Other Visit Diagnoses     Lightheadedness    -  Primary    Relevant Orders    CBC with platelets (Completed)    Ferritin    Iron deficiency anemia, unspecified iron deficiency anemia type        Relevant Orders    Ferritin         Lightheadedness correlates with anxiety.  No red flags, no syncope/pre-syncope/feeling faint, no tachycardia, no vertigo.   Recheck CBC/ferritin - she has a hx of anemia.   Increase fluoxetine dose to 60 mg daily and recheck in 6 weeks.                  Return in about 6 weeks (around 1/9/2023) for Virtual Visit, Anxiety Recheck, Medication Recheck.    Jackie Galloway PA-C  [facility]    Carlitos Kunz is a 27 year old, presenting for the following health issues:  Dizziness      HPI     Lightheadedness                Review of Systems         Objective    /76 (BP Location: Left arm, Patient Position: Sitting, Cuff Size: Adult Large)   Pulse 81   Temp 98.5  F (36.9  C) (Oral)   Resp 16   Ht 1.715 m (5' 7.52\")   Wt 69.5 kg (153 lb 3.2 oz)   LMP 10/25/2022   SpO2 98%   BMI 23.63 kg/m    Body mass index is 23.63 kg/m .  Physical Exam  Constitutional:       General: She is not in acute distress.     Appearance: She is well-developed and well-nourished. She is not diaphoretic.   HENT:      Head: Normocephalic.      Right Ear: External ear normal.      Left Ear: External ear normal.      Nose: Nose normal.   Eyes:      Extraocular Movements: EOM normal.      Conjunctiva/sclera: Conjunctivae normal.   Pulmonary:      Effort: Pulmonary effort is normal.   Musculoskeletal:      Cervical back: Normal range of motion.   Neurological:      Mental Status: She is alert and oriented to person, place, and time.      Comments: Negative " berto-tory   Psychiatric:         Mood and Affect: Mood and affect normal.         Judgment: Judgment normal.

## 2022-11-29 LAB — FERRITIN SERPL-MCNC: 95 NG/ML (ref 6–175)

## 2022-11-29 NOTE — RESULT ENCOUNTER NOTE
Joaquina    Your lab tests are complete and I have reviewed the results.     - Your lab results look great; everything is normal.    If you have any questions or concerns, please feel free to call or send a ICONIC message.    Sincerely,  Rafi Galloway PA-C

## 2022-11-30 NOTE — TELEPHONE ENCOUNTER
Patient had office visit 11/28/2022.     Will close encounter.     Catherine Sorenson RN  [facility]

## 2022-12-06 ENCOUNTER — CARE COORDINATION (OUTPATIENT)
Dept: NEUROLOGY | Facility: CLINIC | Age: 27
End: 2022-12-06

## 2022-12-13 ENCOUNTER — TELEPHONE (OUTPATIENT)
Dept: NEUROLOGY | Facility: CLINIC | Age: 27
End: 2022-12-13

## 2022-12-13 NOTE — TELEPHONE ENCOUNTER
Central Prior Authorization Team   Phone: [phone]      P/A demographics have been submitted to insurance, am awaiting question set.

## 2022-12-14 ENCOUNTER — TELEPHONE (OUTPATIENT)
Dept: NEUROLOGY | Facility: CLINIC | Age: 27
End: 2022-12-14

## 2022-12-14 NOTE — TELEPHONE ENCOUNTER
Central Prior Authorization Team   Phone: [phone]      PA Initiation    Medication: ([employer]) Rimegepant Sulfate (NURTEC) 75 MG TBDP  Insurance Company: [employer] - Phone [phone] Fax [phone]  Pharm[facility]illing [id number] [place] [facility] [place] [place] - [address]  Filling Pharmacy Phone: [phone]  Filling Pharmacy Fax:    Start Date: 12/14/2022

## 2022-12-14 NOTE — TELEPHONE ENCOUNTER
Patient is no longer covered by [employer]. CoverMyMeds sent the automatic renewal not knowing this.    I see patient has [employer], I'll start a separate telephone encounter and try for P/A through new insurance, just in case.

## 2022-12-14 NOTE — TELEPHONE ENCOUNTER
Prior Authorization Not Needed per Insurance    Medication: ([employer]) Rimegepant Sulfate (NURTEC) 75 MG TBDP  Insurance Company: [employer] - Phone [phone] Fax [phone]  Expected CoPay:      [id number] [place] [id number] [place]  Pharmacy Notified:    Patient Notified:

## 2022-12-23 NOTE — TELEPHONE ENCOUNTER
FUTURE VISIT INFORMATION      FUTURE VISIT INFORMATION:    Date: 3/14/23    Time: 2:45 PM    Location: [facility]  REFERRAL INFORMATION:    Referring provider:  Jackie Galloway PA-C    Referring providers clinic:  [facility]    Reason for visit/diagnosis  New per pt-ref, Middle ear effusion, unspecified laterality [H65.90] Lightheadedness [R42] Acute bacterial sinusitis [J01.90, B96.89]    RECORDS REQUESTED FROM:       Clinic name Comments Records Status Imaging Status   [facility] 11/28/22 Mychart encounter and OV with Jackie Galloway PA-C [facility]    Imaging CT head 1/15/19 [facility]  [facility]   [facility]  11/27/2022 OV with Philly Stanford PA-C  Care Everywhere    [facility]   6/16/2016 OV with Lissa Sutherland PA-C  Care Everywhere

## 2023-01-31 ENCOUNTER — CARE COORDINATION (OUTPATIENT)
Dept: NEUROLOGY | Facility: CLINIC | Age: 28
End: 2023-01-31
Payer: COMMERCIAL

## 2023-01-31 NOTE — PROGRESS NOTES
Coretta Burch placed an order on 10/20 & the PA was approved for 4 visits - 400 units per visit from 4/13/22 - 4/12/23, so this patient is good to go.     Thank you,     Poppy

## 2023-02-02 ENCOUNTER — OFFICE VISIT (OUTPATIENT)
Dept: NEUROLOGY | Facility: CLINIC | Age: 28
End: 2023-02-02
Payer: COMMERCIAL

## 2023-02-02 DIAGNOSIS — G43.719 INTRACTABLE CHRONIC MIGRAINE WITHOUT AURA AND WITHOUT STATUS MIGRAINOSUS: Primary | ICD-10-CM

## 2023-02-02 PROCEDURE — 64615 CHEMODENERV MUSC MIGRAINE: CPT | Performed by: NURSE PRACTITIONER

## 2023-02-02 ASSESSMENT — HEADACHE IMPACT TEST (HIT 6)
HOW OFTEN DID HEADACHS LIMIT CONCENTRATION ON WORK OR DAILY ACTIVITY: RARELY
WHEN YOU HAVE A HEADACHE HOW OFTEN DO YOU WISH YOU COULD LIE DOWN: SOMETIMES
WHEN YOU HAVE HEADACHES HOW OFTEN IS THE PAIN SEVERE: SOMETIMES
HIT6 TOTAL SCORE: 58
HOW OFTEN HAVE YOU FELT TOO TIRED TO WORK BECAUSE OF YOUR HEADACHES: SOMETIMES
HOW OFTEN HAVE YOU FELT FED UP OR IRRITATED BECAUSE OF YOUR HEADACHES: SOMETIMES
HOW OFTEN DO HEADACHES LIMIT YOUR DAILY ACTIVITIES: SOMETIMES

## 2023-02-02 NOTE — PROGRESS NOTES
[facility] CLINIC [address] [address]  [phone]  Dept: [phone]  ______________________________________________________________________________     INVASIVE PROCEDURE SAFETY CHECKLIST     Date:2023  Procedure:Botox injections with chronic migraine headache protocol  Patient Name: Ary Lutz  MRN: [id number]  YOB: 1995     Action: Complete sections as appropriate. Any discrepancy results in a HARD COPY until resolved.      PRE PROCEDURE:  Patient ID verified with 2 identifiers (name and  or MRN): Yes  Procedure and site verified with patient/designee (when able): Yes  Accurate consent documentation in medical record: Yes  H&P (or appropriate assessment) documented in medical record: Yes  H&P must be up to 20 days prior to procedure and updates within 24 hours of procedure as applicable: NA  Relevant diagnostic and radiology test results appropriately labeled and displayed as applicable: NA  Procedure site(s) marked with provider initials: NA     TIMEOUT:  Time-Out performed immediately prior to starting procedure, including verbal and active participation of all team members addressing the following:Yes  * Correct patient identify  * Confirmed that the correct side and site are marked  * An accurate procedure consent form  * Agreement on the procedure to be done  * Correct patient position  * Relevant images and results are properly labeled and appropriately displayed  * The need to administer antibiotics or fluids for irrigation purposes during the procedure as applicable   * Safety precautions based on patient history or medication use     DURING PROCEDURE: Verification of correct person, site, and procedures any time the responsibility for care of the patient is transferred to another member of the care team.      PROCEDURE NOTE:     BOTULINUM NEUROTOXIN INJECTION PROCEDURE:  DATA:2023  INDICATIONS FOR  PROCEDURES:   chronic migraine headaches.    baseline symptoms have been recalcitrant to oral medications and conservative therapy. Patient is here today for a repeat  injection with Botox.No problems reported since last Botox visit on 11/09/2022  Wearing off a couple weeks  Decreased headaches by 60% and naratriptan and nurtec work  Wearing off 2 weeks but headaches are mild       GOAL OF PROCEDURE:  The goal of this procedure is to decrease pain and enhance functional independence.     TOTAL DOSE ADMINISTERED:  Dose Administered:  165 units  Botox (Botulinum Toxin Type A)       2:1 Dilution    See MAR  Wasted 45 units  Medication guide was given to patient     CONSENT:  The risks, benefits, and treatment options were discussed with the patient who agreed to proceed.     Written consent was obtained      EQUIPMENT USED:  Needles-30 gauge, 0.5 inches for injections  Four 1-ml tuberculin syringes for injections  One sodium chloride 10 ml vial preservative free  Alcohol swabs     SKIN PREPARATION:  Skin preparation was performed using an alcohol wipe.        AREA/MUSCLE INJECTED:  165 units of Botox  Right upper Trapezius (upper cervical) - 5 units of Botox at 3 site/s.   Left upper Trapezius (upper cervical) - 5 units of Botox at 3 site/s.      Right cervical paraspinals - 5 units of Botox at 2 site/s.   Left cervical paraspinals - 5 units of Botox at 2 site/s.      Left occipitalis - 5 units of Botox at 3 site/s.  Right occipitalis -5 units of Botox at 3 site/s        Right Frontalis - 5 units of Botox at 2 site/s.  Left Frontalis - 5 units of Botox at 2 site/s.     Right Temporalis - 5 units of Botox at 5 site/s.  Left Temporalis - 5 units of Botox at 5 site/s.     Right  - 5 units of Botox at 1 site/s.              Left  - 5 units of Botox at 1 site/s.     Procerus - 5 units of Botox at 1 site/s.     RESPONSE TO PROCEDURE:  tolerated the procedure well and there were no immediate  complications.  Patient was allowed to recover for an appropriate period of time and was discharged home in stable condition.     FOLLOW UP:     Repeat Botox injections in 12 weeks  PT referral      This procedure was performed under a hospital privileging agreement with [facility] Stevenson, Replaced by [facility]

## 2023-02-02 NOTE — LETTER
2023       RE: Ary Lutz  [address] [address]     Dear Colleague,    Thank you for referring your patient, Ary Lutz, to the [facility]UROLOGY CLINIC [place] at [facility]. Please see a copy of my visit note below.    [facility]UROLOGY [facility] [place]  [address]  3RD FLOOR  [facility] [address] [phone]  Dept: [phone]  ______________________________________________________________________________     INVASIVE PROCEDURE SAFETY CHECKLIST     Date:2023  Procedure:Botox injections with chronic migraine headache protocol  Patient Name: Ary Lutz  MRN: [id number]  YOB: 1995     Action: Complete sections as appropriate. Any discrepancy results in a HARD COPY until resolved.      PRE PROCEDURE:  Patient ID verified with 2 identifiers (name and  or MRN): Yes  Procedure and site verified with patient/designee (when able): Yes  Accurate consent documentation in medical record: Yes  H&P (or appropriate assessment) documented in medical record: Yes  H&P must be up to 20 days prior to procedure and updates within 24 hours of procedure as applicable: NA  Relevant diagnostic and radiology test results appropriately labeled and displayed as applicable: NA  Procedure site(s) marked with provider initials: NA     TIMEOUT:  Time-Out performed immediately prior to starting procedure, including verbal and active participation of all team members addressing the following:Yes  * Correct patient identify  * Confirmed that the correct side and site are marked  * An accurate procedure consent form  * Agreement on the procedure to be done  * Correct patient position  * Relevant images and results are properly labeled and appropriately displayed  * The need to administer antibiotics or fluids for irrigation purposes during the procedure as applicable   * Safety precautions based  on patient history or medication use     DURING PROCEDURE: Verification of correct person, site, and procedures any time the responsibility for care of the patient is transferred to another member of the care team.      PROCEDURE NOTE:     BOTULINUM NEUROTOXIN INJECTION PROCEDURE:  DATA:2/2/2023  INDICATIONS FOR PROCEDURES:   chronic migraine headaches.    baseline symptoms have been recalcitrant to oral medications and conservative therapy. Patient is here today for a repeat  injection with Botox.No problems reported since last Botox visit on 11/09/2022  Wearing off a couple weeks  Decreased headaches by 60% and naratriptan and nurtec work  Wearing off 2 weeks but headaches are mild       GOAL OF PROCEDURE:  The goal of this procedure is to decrease pain and enhance functional independence.     TOTAL DOSE ADMINISTERED:  Dose Administered:  165 units  Botox (Botulinum Toxin Type A)       2:1 Dilution    See MAR  Wasted 45 units  Medication guide was given to patient     CONSENT:  The risks, benefits, and treatment options were discussed with the patient who agreed to proceed.     Written consent was obtained      EQUIPMENT USED:  Needles-30 gauge, 0.5 inches for injections  Four 1-ml tuberculin syringes for injections  One sodium chloride 10 ml vial preservative free  Alcohol swabs     SKIN PREPARATION:  Skin preparation was performed using an alcohol wipe.        AREA/MUSCLE INJECTED:  165 units of Botox  Right upper Trapezius (upper cervical) - 5 units of Botox at 3 site/s.   Left upper Trapezius (upper cervical) - 5 units of Botox at 3 site/s.      Right cervical paraspinals - 5 units of Botox at 2 site/s.   Left cervical paraspinals - 5 units of Botox at 2 site/s.      Left occipitalis - 5 units of Botox at 3 site/s.  Right occipitalis -5 units of Botox at 3 site/s        Right Frontalis - 5 units of Botox at 2 site/s.  Left Frontalis - 5 units of Botox at 2 site/s.     Right Temporalis - 5 units of Botox at 5  site/s.  Left Temporalis - 5 units of Botox at 5 site/s.     Right  - 5 units of Botox at 1 site/s.              Left  - 5 units of Botox at 1 site/s.     Procerus - 5 units of Botox at 1 site/s.     RESPONSE TO PROCEDURE:  tolerated the procedure well and there were no immediate complications.  Patient was allowed to recover for an appropriate period of time and was discharged home in stable condition.     FOLLOW UP:     Repeat Botox injections in 12 weeks  PT referral      This procedure was performed under a hospital privileging agreement with Dr. Sarabjit Shea, ROBE, [facility]

## 2023-02-19 DIAGNOSIS — F41.1 GAD (GENERALIZED ANXIETY DISORDER): ICD-10-CM

## 2023-02-24 DIAGNOSIS — Z01.10 ENCOUNTER FOR HEARING TEST: Primary | ICD-10-CM

## 2023-03-14 ENCOUNTER — PRE VISIT (OUTPATIENT)
Dept: OTOLARYNGOLOGY | Facility: CLINIC | Age: 28
End: 2023-03-14

## 2023-04-13 ENCOUNTER — TELEPHONE (OUTPATIENT)
Dept: NEUROLOGY | Facility: CLINIC | Age: 28
End: 2023-04-13
Payer: COMMERCIAL

## 2023-04-13 NOTE — TELEPHONE ENCOUNTER
Raji Ureña,     Below is a note in the referral that was entered in earlier today:     *04.27.23 @ 2:30 / [facility] NEURO / BOTOX () - Sent PA with previous approval from 04.10.22. & office visit from 02.02.23. - Reference Number   [id number]       Status   PENDED       Review Reason 1   Requires Medical Review       Message   REVIEW RESPONSE MESSAGE FOR ALL SERVICES BELOW AS THERE MAY BE REFERRAL(S) TO OTHER SERVICE PARTNERS. FOR SERVICES IN PENDED STATUS ADDITIONAL INFORMATION MAY BE NEEDED     Thank you,     Poppy

## 2023-04-13 NOTE — TELEPHONE ENCOUNTER
Pt has upcoming botox appt.  Has it been authorized?  Please update. Thank you\      Sent to Cam finance team

## 2023-04-25 ASSESSMENT — HEADACHE IMPACT TEST (HIT 6)
WHEN YOU HAVE HEADACHES HOW OFTEN IS THE PAIN SEVERE: SOMETIMES
WHEN YOU HAVE A HEADACHE HOW OFTEN DO YOU WISH YOU COULD LIE DOWN: SOMETIMES
HOW OFTEN HAVE YOU FELT TOO TIRED TO WORK BECAUSE OF YOUR HEADACHES: RARELY
HOW OFTEN DID HEADACHS LIMIT CONCENTRATION ON WORK OR DAILY ACTIVITY: RARELY
HIT6 TOTAL SCORE: 54
HOW OFTEN HAVE YOU FELT FED UP OR IRRITATED BECAUSE OF YOUR HEADACHES: RARELY
HOW OFTEN DO HEADACHES LIMIT YOUR DAILY ACTIVITIES: SOMETIMES

## 2023-04-27 ENCOUNTER — OFFICE VISIT (OUTPATIENT)
Dept: NEUROLOGY | Facility: CLINIC | Age: 28
End: 2023-04-27
Payer: COMMERCIAL

## 2023-04-27 DIAGNOSIS — G43.719 INTRACTABLE CHRONIC MIGRAINE WITHOUT AURA AND WITHOUT STATUS MIGRAINOSUS: Primary | ICD-10-CM

## 2023-04-27 PROCEDURE — 64615 CHEMODENERV MUSC MIGRAINE: CPT | Performed by: NURSE PRACTITIONER

## 2023-04-27 NOTE — LETTER
2023       RE: Ary Lutz  [address] [address]     Dear Colleague,    Thank you for referring your patient, Ary Lutz, to the [facility] NEUROLOGY CLINIC [place] at [facility]. Please see a copy of my visit note below.    [facility]UROLOGY [facility] [place]  [address]  3RD FLOOR  [facility] [address] [phone]  Dept: [phone]  ______________________________________________________________________________     INVASIVE PROCEDURE SAFETY CHECKLIST     Date:2023  Procedure:Botox injections with chronic migraine headache protocol  Patient Name: Ary Lutz  MRN: [id number]  YOB: 1995     Action: Complete sections as appropriate. Any discrepancy results in a HARD COPY until resolved.      PRE PROCEDURE:  Patient ID verified with 2 identifiers (name and  or MRN): Yes  Procedure and site verified with patient/designee (when able): Yes  Accurate consent documentation in medical record: Yes  H&P (or appropriate assessment) documented in medical record: Yes  H&P must be up to 20 days prior to procedure and updates within 24 hours of procedure as applicable: NA  Relevant diagnostic and radiology test results appropriately labeled and displayed as applicable: NA  Procedure site(s) marked with provider initials: NA     TIMEOUT:  Time-Out performed immediately prior to starting procedure, including verbal and active participation of all team members addressing the following:Yes  * Correct patient identify  * Confirmed that the correct side and site are marked  * An accurate procedure consent form  * Agreement on the procedure to be done  * Correct patient position  * Relevant images and results are properly labeled and appropriately displayed  * The need to administer antibiotics or fluids for irrigation purposes during the procedure as applicable   * Safety precautions  based on patient history or medication use     DURING PROCEDURE: Verification of correct person, site, and procedures any time the responsibility for care of the patient is transferred to another member of the care team.      PROCEDURE NOTE:     BOTULINUM NEUROTOXIN INJECTION PROCEDURE:  DATA:4/27/2023  INDICATIONS FOR PROCEDURES:   chronic migraine headaches.    baseline symptoms have been recalcitrant to oral medications and conservative therapy. Patient is here today for a repeat  injection with Botox.No problems reported since last Botox visit on 2/2/2023 and relief abiut 70% in combination with Nurtec.   Wearing off a couple weeks  Decreased headaches by 60% and naratriptan and nurtec work  Wearing off 2 weeks but headaches are mild       GOAL OF PROCEDURE:  The goal of this procedure is to decrease pain and enhance functional independence.     TOTAL DOSE ADMINISTERED:  Dose Administered:  165 units  Botox (Botulinum Toxin Type A)       2:1 Dilution    See MAR  Wasted 45 units  Medication guide was given to patient     CONSENT:  The risks, benefits, and treatment options were discussed with the patient who agreed to proceed.     Written consent was obtained      EQUIPMENT USED:  Needles-30 gauge, 0.5 inches for injections  Four 1-ml tuberculin syringes for injections  One sodium chloride 10 ml vial preservative free  Alcohol swabs     SKIN PREPARATION:  Skin preparation was performed using an alcohol wipe.        AREA/MUSCLE INJECTED:  165 units of Botox  Right upper Trapezius (upper cervical) - 5 units of Botox at 3 site/s.   Left upper Trapezius (upper cervical) - 5 units of Botox at 3 site/s.      Right cervical paraspinals - 5 units of Botox at 2 site/s.   Left cervical paraspinals - 5 units of Botox at 2 site/s.      Left occipitalis - 5 units of Botox at 3 site/s.  Right occipitalis -5 units of Botox at 3 site/s        Right Frontalis - 5 units of Botox at 2 site/s.  Left Frontalis - 5 units of Botox at 2  site/s.     Right Temporalis - 5 units of Botox at 5 site/s.  Left Temporalis - 5 units of Botox at 5 site/s.     Right  - 5 units of Botox at 1 site/s.              Left  - 5 units of Botox at 1 site/s.     Procerus - 5 units of Botox at 1 site/s.     RESPONSE TO PROCEDURE:  tolerated the procedure well and there were no immediate complications.  Patient was allowed to recover for an appropriate period of time and was discharged home in stable condition.     FOLLOW UP:     Repeat Botox injections in 12 weeks  PT referral      This procedure was performed under a hospital privileging agreement with Dr. Whipple             Again, thank you for allowing me to participate in the care of your patient.      Sincerely,    ROBE Vargas CNP

## 2023-04-27 NOTE — PROGRESS NOTES
[facility] NEUROLOG[facility] CLINIC [address] [address]  [phone]  Dept: [phone]  ______________________________________________________________________________     INVASIVE PROCEDURE SAFETY CHECKLIST     Date:2023  Procedure:Botox injections with chronic migraine headache protocol  Patient Name: Ary Lutz  MRN: [id number]  YOB: 1995     Action: Complete sections as appropriate. Any discrepancy results in a HARD COPY until resolved.      PRE PROCEDURE:  Patient ID verified with 2 identifiers (name and  or MRN): Yes  Procedure and site verified with patient/designee (when able): Yes  Accurate consent documentation in medical record: Yes  H&P (or appropriate assessment) documented in medical record: Yes  H&P must be up to 20 days prior to procedure and updates within 24 hours of procedure as applicable: NA  Relevant diagnostic and radiology test results appropriately labeled and displayed as applicable: NA  Procedure site(s) marked with provider initials: NA     TIMEOUT:  Time-Out performed immediately prior to starting procedure, including verbal and active participation of all team members addressing the following:Yes  * Correct patient identify  * Confirmed that the correct side and site are marked  * An accurate procedure consent form  * Agreement on the procedure to be done  * Correct patient position  * Relevant images and results are properly labeled and appropriately displayed  * The need to administer antibiotics or fluids for irrigation purposes during the procedure as applicable   * Safety precautions based on patient history or medication use     DURING PROCEDURE: Verification of correct person, site, and procedures any time the responsibility for care of the patient is transferred to another member of the care team.      PROCEDURE NOTE:     BOTULINUM NEUROTOXIN INJECTION PROCEDURE:  DATA:2023  INDICATIONS FOR  PROCEDURES:   chronic migraine headaches.    baseline symptoms have been recalcitrant to oral medications and conservative therapy. Patient is here today for a repeat  injection with Botox.No problems reported since last Botox visit on 2/2/2023 and relief abiut 70% in combination with Nurtec.   Wearing off a couple weeks  Decreased headaches by 60% and naratriptan and nurtec work  Wearing off 2 weeks but headaches are mild       GOAL OF PROCEDURE:  The goal of this procedure is to decrease pain and enhance functional independence.     TOTAL DOSE ADMINISTERED:  Dose Administered:  165 units  Botox (Botulinum Toxin Type A)       2:1 Dilution    See MAR  Wasted 45 units  Medication guide was given to patient     CONSENT:  The risks, benefits, and treatment options were discussed with the patient who agreed to proceed.     Written consent was obtained      EQUIPMENT USED:  Needles-30 gauge, 0.5 inches for injections  Four 1-ml tuberculin syringes for injections  One sodium chloride 10 ml vial preservative free  Alcohol swabs     SKIN PREPARATION:  Skin preparation was performed using an alcohol wipe.        AREA/MUSCLE INJECTED:  165 units of Botox  Right upper Trapezius (upper cervical) - 5 units of Botox at 3 site/s.   Left upper Trapezius (upper cervical) - 5 units of Botox at 3 site/s.      Right cervical paraspinals - 5 units of Botox at 2 site/s.   Left cervical paraspinals - 5 units of Botox at 2 site/s.      Left occipitalis - 5 units of Botox at 3 site/s.  Right occipitalis -5 units of Botox at 3 site/s        Right Frontalis - 5 units of Botox at 2 site/s.  Left Frontalis - 5 units of Botox at 2 site/s.     Right Temporalis - 5 units of Botox at 5 site/s.  Left Temporalis - 5 units of Botox at 5 site/s.     Right  - 5 units of Botox at 1 site/s.              Left  - 5 units of Botox at 1 site/s.     Procerus - 5 units of Botox at 1 site/s.     RESPONSE TO PROCEDURE:  tolerated the procedure  well and there were no immediate complications.  Patient was allowed to recover for an appropriate period of time and was discharged home in stable condition.     FOLLOW UP:     Repeat Botox injections in 12 weeks  PT referral      This procedure was performed under a hospital privileging agreement with ROBE Stevenson, [facility]

## 2023-05-18 DIAGNOSIS — F41.1 GAD (GENERALIZED ANXIETY DISORDER): ICD-10-CM

## 2023-05-18 NOTE — TELEPHONE ENCOUNTER
Prescription approved per [facility] Refill Protocol.  Jessie Bennett, RN  [facility] Triage Nurse

## 2023-06-13 DIAGNOSIS — Z30.41 ENCOUNTER FOR SURVEILLANCE OF CONTRACEPTIVE PILLS: ICD-10-CM

## 2023-06-14 DIAGNOSIS — F41.1 GAD (GENERALIZED ANXIETY DISORDER): ICD-10-CM

## 2023-06-14 RX ORDER — DROSPIRENONE AND ETHINYL ESTRADIOL 0.02-3(28)
KIT ORAL
Qty: 84 TABLET | Refills: 3 | Status: SHIPPED | OUTPATIENT
Start: 2023-06-14 | End: 2024-08-30

## 2023-06-14 NOTE — TELEPHONE ENCOUNTER
Prescription approved per [facility] Refill Protocol.  Jessie Bennett, RN  [facility] Triage Nurse

## 2023-06-19 ENCOUNTER — TELEPHONE (OUTPATIENT)
Dept: NEUROLOGY | Facility: CLINIC | Age: 28
End: 2023-06-19
Payer: COMMERCIAL

## 2023-06-19 DIAGNOSIS — G43.019 INTRACTABLE MIGRAINE WITHOUT AURA AND WITHOUT STATUS MIGRAINOSUS: ICD-10-CM

## 2023-06-19 NOTE — TELEPHONE ENCOUNTER
Prior Authorization Retail Medication Request  RENEWAL    Medication/Dose: Nurtec 75 mg  ICD code (if different than what is on RX):  G43.019  Previously Tried and Failed:  naratriptan, sumatriptan and zolmitriptans, Hydroxyzine and prozac-anxiety/depression  Verapamil 40 mg twice daily but does not help and no difference    Rationale:  Episodic migraine. Recommend nurtec at every other day dosing to help prevent episodic migraine. Pt has 9 headache days per month.      Insurance Name:  preferredone  Insurance ID:  [id number]      Pharmacy Information (if different than what is on RX)  Name:    Phone:

## 2023-06-23 NOTE — TELEPHONE ENCOUNTER
PA Initiation    Medication: RIMEGEPANT SULFATE 75 MG PO TBDP  Insurance Company: [employer] - Phone [phone] Fax [phone]  [id number] [place] [facility] [place] [facility] [address]  Filling Pharmacy Phone: [phone]  Filling Pharmacy Fax: [phone]  Start Date: 6/22/2023

## 2023-06-23 NOTE — TELEPHONE ENCOUNTER
Prior Authorization Not Needed per Insurance    Medication: RIMEGEPANT SULFATE 75 MG PO TBDP  Insurance Company: [employer] - Phone [phone] Fax [phone]  Expected CoPay:      Ph[facility] [id number] [place] [facility] [place] [facility] [address]  Pharmacy Notified: Yes  Patient Notified: Yes **Instructed pharmacy to notify patient when script is ready to /ship.**

## 2023-07-14 ENCOUNTER — TELEPHONE (OUTPATIENT)
Dept: NEUROLOGY | Facility: CLINIC | Age: 28
End: 2023-07-14
Payer: COMMERCIAL

## 2023-07-14 NOTE — TELEPHONE ENCOUNTER
04.27.23 / [facility] NEURO / BOTOX () - PA approved for Coretta Shea to buy & bill for 200 units every 12 weeks from 04.13.23. - 04.12.24. with approval # [id number] - Called pt on 01.24.23. & LM informing her that she can try to enroll for copay assistance & if she's interested to give me a call back & if I don't answer to LM with an email that she would like me to send a MOLLY to

## 2023-07-14 NOTE — TELEPHONE ENCOUNTER
Raji Ureña!     Yep, patient's PA is still valid  & she is approved     PA approved for Coretta Shea to buy & bill for 200 units every 12 weeks from 04.13.23. - 04.12.24. with approval # [id number] -     Eva Roy, CAM

## 2023-07-27 ENCOUNTER — OFFICE VISIT (OUTPATIENT)
Dept: NEUROLOGY | Facility: CLINIC | Age: 28
End: 2023-07-27
Payer: COMMERCIAL

## 2023-07-27 DIAGNOSIS — G43.719 INTRACTABLE CHRONIC MIGRAINE WITHOUT AURA AND WITHOUT STATUS MIGRAINOSUS: Primary | ICD-10-CM

## 2023-07-27 PROCEDURE — 64615 CHEMODENERV MUSC MIGRAINE: CPT | Performed by: NURSE PRACTITIONER

## 2023-07-27 ASSESSMENT — HEADACHE IMPACT TEST (HIT 6)
WHEN YOU HAVE HEADACHES HOW OFTEN IS THE PAIN SEVERE: SOMETIMES
HIT6 TOTAL SCORE: 52
HOW OFTEN DID HEADACHS LIMIT CONCENTRATION ON WORK OR DAILY ACTIVITY: RARELY
HOW OFTEN HAVE YOU FELT TOO TIRED TO WORK BECAUSE OF YOUR HEADACHES: RARELY
WHEN YOU HAVE A HEADACHE HOW OFTEN DO YOU WISH YOU COULD LIE DOWN: SOMETIMES
HOW OFTEN HAVE YOU FELT FED UP OR IRRITATED BECAUSE OF YOUR HEADACHES: RARELY
HOW OFTEN DO HEADACHES LIMIT YOUR DAILY ACTIVITIES: RARELY

## 2023-07-27 NOTE — PROGRESS NOTES
[facility] NEUROLOGY CLINIC [address] [address]  [phone]  Dept: [phone]  ______________________________________________________________________________     INVASIVE PROCEDURE SAFETY CHECKLIST     Date:2023  Procedure:Botox injections with chronic migraine headache protocol  Patient Name: Ary Lutz  MRN: [id number]  YOB: 1995     Action: Complete sections as appropriate. Any discrepancy results in a HARD COPY until resolved.      PRE PROCEDURE:  Patient ID verified with 2 identifiers (name and  or MRN): Yes  Procedure and site verified with patient/designee (when able): Yes  Accurate consent documentation in medical record: Yes  H&P (or appropriate assessment) documented in medical record: Yes  H&P must be up to 20 days prior to procedure and updates within 24 hours of procedure as applicable: NA  Relevant diagnostic and radiology test results appropriately labeled and displayed as applicable: NA  Procedure site(s) marked with provider initials: NA     TIMEOUT:  Time-Out performed immediately prior to starting procedure, including verbal and active participation of all team members addressing the following:Yes  * Correct patient identify  * Confirmed that the correct side and site are marked  * An accurate procedure consent form  * Agreement on the procedure to be done  * Correct patient position  * Relevant images and results are properly labeled and appropriately displayed  * The need to administer antibiotics or fluids for irrigation purposes during the procedure as applicable   * Safety precautions based on patient history or medication use     DURING PROCEDURE: Verification of correct person, site, and procedures any time the responsibility for care of the patient is transferred to another member of the care team.      PROCEDURE NOTE:     BOTULINUM NEUROTOXIN INJECTION PROCEDURE:  DATA:2023  INDICATIONS FOR  PROCEDURES:   chronic migraine headaches.    baseline symptoms have been recalcitrant to oral medications and conservative therapy. Patient is here today for a repeat  injection with Botox.No problems reported since last Botox visit on 4/27/2023  and relief about 80% in combination with Nurtec.   Wearing off a couple weeks before reinjection and otherwise episodic migraines.   Able to function better with Botox therapy    GOAL OF PROCEDURE:  The goal of this procedure is to decrease pain and enhance functional independence.     TOTAL DOSE ADMINISTERED:  Dose Administered:  165 units  Botox (Botulinum Toxin Type A)       2:1 Dilution    See MAR  Wasted 45 units     CONSENT:  The risks, benefits, and treatment options were discussed with the patient who agreed to proceed.     Written consent was obtained      EQUIPMENT USED:  Needles-30 gauge, 0.5 inches for injections  Four 1-ml tuberculin syringes for injections  One sodium chloride 10 ml vial preservative free  Alcohol swabs     SKIN PREPARATION:  Skin preparation was performed using an alcohol wipe.        AREA/MUSCLE INJECTED:  165 units of Botox  Right upper Trapezius (upper cervical) - 5 units of Botox at 3 site/s.   Left upper Trapezius (upper cervical) - 5 units of Botox at 3 site/s.      Right cervical paraspinals - 5 units of Botox at 2 site/s.   Left cervical paraspinals - 5 units of Botox at 2 site/s.      Left occipitalis - 5 units of Botox at 3 site/s.  Right occipitalis -5 units of Botox at 3 site/s        Right Frontalis - 5 units of Botox at 2 site/s.  Left Frontalis - 5 units of Botox at 2 site/s.     Right Temporalis - 5 units of Botox at 5 site/s.  Left Temporalis - 5 units of Botox at 5 site/s.     Right  - 5 units of Botox at 1 site/s.              Left  - 5 units of Botox at 1 site/s.     Procerus - 5 units of Botox at 1 site/s.     RESPONSE TO PROCEDURE:  tolerated the procedure well and there were no immediate  complications.  Patient was allowed to recover for an appropriate period of time and was discharged home in stable condition.     FOLLOW UP:     Repeat Botox injections in 12 weeks  PT referral      This procedure was performed under a hospital privileging agreement with ROBE Stevenson, [facility]

## 2023-07-27 NOTE — LETTER
2023       RE: Ary Lutz  [address] [facility] [address]       Dear Colleague,    Thank you for referring your patient, Ary Lutz, to the [facility] NEUROLOGY CLINIC [place] at [facility]. Please see a copy of my visit note below.    [facility] NEUROLOGY [facility]IC [place]  [address]  3RD FLOOR  [facility] [address] [phone]  Dept: [phone]  ______________________________________________________________________________     INVASIVE PROCEDURE SAFETY CHECKLIST     Date:2023  Procedure:Botox injections with chronic migraine headache protocol  Patient Name: Ary Lutz  MRN: [id number]  YOB: 1995     Action: Complete sections as appropriate. Any discrepancy results in a HARD COPY until resolved.      PRE PROCEDURE:  Patient ID verified with 2 identifiers (name and  or MRN): Yes  Procedure and site verified with patient/designee (when able): Yes  Accurate consent documentation in medical record: Yes  H&P (or appropriate assessment) documented in medical record: Yes  H&P must be up to 20 days prior to procedure and updates within 24 hours of procedure as applicable: NA  Relevant diagnostic and radiology test results appropriately labeled and displayed as applicable: NA  Procedure site(s) marked with provider initials: NA     TIMEOUT:  Time-Out performed immediately prior to starting procedure, including verbal and active participation of all team members addressing the following:Yes  * Correct patient identify  * Confirmed that the correct side and site are marked  * An accurate procedure consent form  * Agreement on the procedure to be done  * Correct patient position  * Relevant images and results are properly labeled and appropriately displayed  * The need to administer antibiotics or fluids for irrigation purposes during the procedure as applicable   * Safety precautions  based on patient history or medication use     DURING PROCEDURE: Verification of correct person, site, and procedures any time the responsibility for care of the patient is transferred to another member of the care team.      PROCEDURE NOTE:     BOTULINUM NEUROTOXIN INJECTION PROCEDURE:  DATA:7/27/2023  INDICATIONS FOR PROCEDURES:   chronic migraine headaches.    baseline symptoms have been recalcitrant to oral medications and conservative therapy. Patient is here today for a repeat  injection with Botox.No problems reported since last Botox visit on 4/27/2023  and relief about 80% in combination with Nurtec.   Wearing off a couple weeks before reinjection and otherwise episodic migraines.   Able to function better with Botox therapy    GOAL OF PROCEDURE:  The goal of this procedure is to decrease pain and enhance functional independence.     TOTAL DOSE ADMINISTERED:  Dose Administered:  165 units  Botox (Botulinum Toxin Type A)       2:1 Dilution    See MAR  Wasted 45 units     CONSENT:  The risks, benefits, and treatment options were discussed with the patient who agreed to proceed.     Written consent was obtained      EQUIPMENT USED:  Needles-30 gauge, 0.5 inches for injections  Four 1-ml tuberculin syringes for injections  One sodium chloride 10 ml vial preservative free  Alcohol swabs     SKIN PREPARATION:  Skin preparation was performed using an alcohol wipe.        AREA/MUSCLE INJECTED:  165 units of Botox  Right upper Trapezius (upper cervical) - 5 units of Botox at 3 site/s.   Left upper Trapezius (upper cervical) - 5 units of Botox at 3 site/s.      Right cervical paraspinals - 5 units of Botox at 2 site/s.   Left cervical paraspinals - 5 units of Botox at 2 site/s.      Left occipitalis - 5 units of Botox at 3 site/s.  Right occipitalis -5 units of Botox at 3 site/s        Right Frontalis - 5 units of Botox at 2 site/s.  Left Frontalis - 5 units of Botox at 2 site/s.     Right Temporalis - 5 units of  Botox at 5 site/s.  Left Temporalis - 5 units of Botox at 5 site/s.     Right  - 5 units of Botox at 1 site/s.              Left  - 5 units of Botox at 1 site/s.     Procerus - 5 units of Botox at 1 site/s.     RESPONSE TO PROCEDURE:  tolerated the procedure well and there were no immediate complications.  Patient was allowed to recover for an appropriate period of time and was discharged home in stable condition.     FOLLOW UP:     Repeat Botox injections in 12 weeks  PT referral      This procedure was performed under a hospital privileging agreement with Dr. Whipple             Again, thank you for allowing me to participate in the care of your patient.      Sincerely,    ROBE Vargas CNP

## 2023-08-02 ENCOUNTER — VIRTUAL VISIT (OUTPATIENT)
Dept: NEUROLOGY | Facility: CLINIC | Age: 28
End: 2023-08-02
Payer: COMMERCIAL

## 2023-08-02 DIAGNOSIS — M79.18 MYOFASCIAL PAIN: Primary | ICD-10-CM

## 2023-08-02 PROCEDURE — 99213 OFFICE O/P EST LOW 20 MIN: CPT | Mod: VID | Performed by: NURSE PRACTITIONER

## 2023-08-02 RX ORDER — TIZANIDINE 2 MG/1
2-4 TABLET ORAL AT BEDTIME
Qty: 60 TABLET | Refills: 3 | Status: SHIPPED | OUTPATIENT
Start: 2023-08-02 | End: 2023-12-01

## 2023-08-02 ASSESSMENT — HEADACHE IMPACT TEST (HIT 6)
HOW OFTEN HAVE YOU FELT TOO TIRED TO WORK BECAUSE OF YOUR HEADACHES: RARELY
HOW OFTEN DO HEADACHES LIMIT YOUR DAILY ACTIVITIES: RARELY
HOW OFTEN DID HEADACHS LIMIT CONCENTRATION ON WORK OR DAILY ACTIVITY: RARELY
HIT6 TOTAL SCORE: 52
WHEN YOU HAVE A HEADACHE HOW OFTEN DO YOU WISH YOU COULD LIE DOWN: SOMETIMES
WHEN YOU HAVE HEADACHES HOW OFTEN IS THE PAIN SEVERE: SOMETIMES
HOW OFTEN HAVE YOU FELT FED UP OR IRRITATED BECAUSE OF YOUR HEADACHES: RARELY

## 2023-08-02 NOTE — LETTER
"8/2/2023       RE: Ary Lutz  [address] [facility] [address]       Dear Colleague,    Thank you for referring your patient, Ary Lutz, to the [facility] NEUROLOGY CLINIC [place] at [facility]. Please see a copy of my visit note below.    Joaquina is a 28 year old who is being evaluated via a billable video visit.      Subjective   Joaquina is a 28 year old, presenting for the following health issues:headache   RECHECK  Consistent pain started after Botox a couple hours after last Botox last week. No other symptoms. No visual symptoms. Able to sleep at night a little bit  Headaches not severe but has been feeling tension headaches and holocephalic and turned into migraine last night. Feels like someone \"shook headaches\" and tension like.   Pain does not stop from doing things. Feels like a ribbon of muscle tightness in the injection sites.   Last night naratriptan helped with more severe pain   Has been taking ibuprofen every other day -it helped a little bit. Just ibuprofen, nurtec,       Plan:  A trial of tizanidine 2-4 mg at bedtime for muscle spasm, migraines and side effects reviewed. Vestura may increase tizanidine dose level in your body. Will do a lower dose and monitor for side effects  try naproxen 1-2 tabs every 12 hours OTC as needed. Limit use to no more than 14 days per month   Nurtec every other day   Naratriptan as needed   Follow up via MyChart or sooner if needed         Objective    Vitals - Patient Reported  Weight (Patient Reported): 62.6 kg (138 lb)  Height (Patient Reported): 171.5 cm (5' 7.5\")  BMI (Based on Pt Reported Ht/Wt): 21.29  Pain Score: No Pain (0)      Physical Exam   GENERAL: Healthy, alert and no distress  EYES: Eyes grossly normal to inspection. RESP: No audible wheeze, cough, or visible cyanosis.  No visible retractions or increased work of breathing.    SKIN: Visible skin clear. NEURO: Face is " symmetrical and symmetrical facial expressions, no weakness  Mentation and speech appropriate for age.  PSYCH: Mentation appears normal, affect normal, judgement and insight intact, normal speech and appearance well-groomed.    I discussed all my recommendations with Ary Lutz who verbalizes understanding and comfortable with the plan.      19 minutes spent on the date of the encounter doing video access, chart  review, exam,  meds review, treatment plan, documentation and further activities as noted above          Again, thank you for allowing me to participate in the care of your patient.      Sincerely,    ROBE Vargas CNP

## 2023-08-02 NOTE — NURSING NOTE
Is the patient currently in the state of [place]? YES    Visit mode:VIDEO    If the visit is dropped, the patient can be reconnected by: VIDEO VISIT: Text to cell phone: [phone]    Will anyone else be joining the visit? NO      How would you like to obtain your AVS? MyChart    Are changes needed to the allergy or medication list? NO    Reason for visit: RECHECK

## 2023-08-02 NOTE — PROGRESS NOTES
"Joaquina is a 28 year old who is being evaluated via a billable video visit.      Subjective   Joaquina is a 28 year old, presenting for the following health issues:headache   RECHECK  Consistent pain started after Botox a couple hours after last Botox last week. No other symptoms. No visual symptoms. Able to sleep at night a little bit  Headaches not severe but has been feeling tension headaches and holocephalic and turned into migraine last night. Feels like someone \"shook headaches\" and tension like.   Pain does not stop from doing things. Feels like a ribbon of muscle tightness in the injection sites.   Last night naratriptan helped with more severe pain   Has been taking ibuprofen every other day -it helped a little bit. Just ibuprofen, nurtec,       Plan:  A trial of tizanidine 2-4 mg at bedtime for muscle spasm, migraines and side effects reviewed. Vestura may increase tizanidine dose level in your body. Will do a lower dose and monitor for side effects  try naproxen 1-2 tabs every 12 hours OTC as needed. Limit use to no more than 14 days per month   Nurtec every other day   Naratriptan as needed   [facility] or sooner if needed         Objective    Vitals - Patient Reported  Weight (Patient Reported): 62.6 kg (138 lb)  Height (Patient Reported): 171.5 cm (5' 7.5\")  BMI (Based on Pt Reported Ht/Wt): 21.29  Pain Score: No Pain (0)      Physical Exam   GENERAL: Healthy, alert and no distress  EYES: Eyes grossly normal to inspection. RESP: No audible wheeze, cough, or visible cyanosis.  No visible retractions or increased work of breathing.    SKIN: Visible skin clear. NEURO: Face is symmetrical and symmetrical facial expressions, no weakness  Mentation and speech appropriate for age.  PSYCH: Mentation appears normal, affect normal, judgement and insight intact, normal speech and appearance well-groomed.    I discussed all my recommendations with Ary Lutz who verbalizes understanding and " comfortable with the plan.      19 minutes spent on the date of the encounter doing video access, chart  review, exam,  meds review, treatment plan, documentation and further activities as noted above    ROBE Urbina, CNP [facility]  Headache certified  [facility]        Video-Visit Details  Type of service:  Video Visit   Originating Location (pt. Location): [facility]  Distant Location (provider location):  Off-site  Platform used for Video Visit: [email or web address]

## 2023-08-25 DIAGNOSIS — M79.18 MYOFASCIAL PAIN: ICD-10-CM

## 2023-08-25 RX ORDER — TIZANIDINE 2 MG/1
2-4 TABLET ORAL AT BEDTIME
Qty: 60 TABLET | Refills: 3 | OUTPATIENT
Start: 2023-08-25

## 2023-09-21 ASSESSMENT — ENCOUNTER SYMPTOMS
PARESTHESIAS: 0
EYE PAIN: 0
CHILLS: 0
SORE THROAT: 0
NERVOUS/ANXIOUS: 0
MYALGIAS: 0
HEMATOCHEZIA: 0
NAUSEA: 0
DIZZINESS: 0
CONSTIPATION: 0
ABDOMINAL PAIN: 0
FREQUENCY: 0
FEVER: 0
WEAKNESS: 0
HEARTBURN: 0
SHORTNESS OF BREATH: 0
JOINT SWELLING: 0
DYSURIA: 0
PALPITATIONS: 0
BREAST MASS: 0
ARTHRALGIAS: 0
COUGH: 0
HEADACHES: 0
HEMATURIA: 0
DIARRHEA: 0

## 2023-09-21 NOTE — COMMUNITY RESOURCES LIST (ENGLISH)
09/21/2023    [employer] [place]  N/A  For questions about this resource list or additional care needs, please contact your primary care clinic or care manager.  Phone: [phone]   Email: N/A   Address: [address] [place] [address]   Hours: N/A        Financial Stability       Utility payment assistance  1  [facility] Distance: 0.96 miles      In-Person, Phone/Virtual   [address] [place] [address] [address] [place] [address]  Language: English, Prydeinig, Brazilian, Khmer  Hours: Mon - Fri 8:00 AM - 4:30 PM  Fees: Free   Phone: (939) 762-7893 Email: socialseremily@[facility] Website: http://www.[place]/residents#human-services     2  [facility] ([facility]) - [employer] - Homelessness Prevention Distance: 1.82 miles      In-Person, Phone/Virtual   [address] [place] [address]  Language: English, Brazilian, Khmer  Hours: Mon 12:00 AM - 7:00 PM , Tue 10:00 AM - 3:00 PM , Wed - Thu 10:00 AM - 2:30 PM  Fees: Free   Phone: (615) 375-4963 Email: ica@[employer] Website: http://www.[employer]f.org          Important Numbers & Websites       Emergency Services   911  [facility]   311  Poison Control   (311) 472-3900  Suicide Prevention Lifeline   (193) 673-8987 (TALK)  Child Abuse Hotline   (273) 445-1515 (4-A-Child)  Sexual Assault Hotline   (634) 985-1980 (HOPE)  National Runaway Safeline   (922) 953-8508 (RUNAWAY)  All-Options Talkline   (828) 739-9054  Substance Abuse Referral   (868) 793-1863 (HELP)

## 2023-09-22 ENCOUNTER — OFFICE VISIT (OUTPATIENT)
Dept: FAMILY MEDICINE | Facility: CLINIC | Age: 28
End: 2023-09-22
Payer: COMMERCIAL

## 2023-09-22 VITALS
DIASTOLIC BLOOD PRESSURE: 74 MMHG | RESPIRATION RATE: 14 BRPM | SYSTOLIC BLOOD PRESSURE: 102 MMHG | BODY MASS INDEX: 21.47 KG/M2 | HEART RATE: 77 BPM | OXYGEN SATURATION: 98 % | WEIGHT: 136.8 LBS | TEMPERATURE: 97 F | HEIGHT: 67 IN

## 2023-09-22 DIAGNOSIS — Z12.4 CERVICAL CANCER SCREENING: ICD-10-CM

## 2023-09-22 DIAGNOSIS — Z31.69 PRE-CONCEPTION COUNSELING: ICD-10-CM

## 2023-09-22 DIAGNOSIS — Z13.6 SCREENING FOR CARDIOVASCULAR CONDITION: ICD-10-CM

## 2023-09-22 DIAGNOSIS — Z00.00 ROUTINE GENERAL MEDICAL EXAMINATION AT A HEALTH CARE FACILITY: Primary | ICD-10-CM

## 2023-09-22 PROBLEM — K21.9 ESOPHAGEAL REFLUX: Status: RESOLVED | Noted: 2017-09-12 | Resolved: 2023-09-22

## 2023-09-22 LAB
ALBUMIN SERPL BCG-MCNC: 4.2 G/DL (ref 3.5–5.2)
ALP SERPL-CCNC: 55 U/L (ref 35–104)
ALT SERPL W P-5'-P-CCNC: 15 U/L (ref 0–50)
ANION GAP SERPL CALCULATED.3IONS-SCNC: 11 MMOL/L (ref 7–15)
AST SERPL W P-5'-P-CCNC: 24 U/L (ref 0–45)
BILIRUB SERPL-MCNC: 0.6 MG/DL
BUN SERPL-MCNC: 14.5 MG/DL (ref 6–20)
CALCIUM SERPL-MCNC: 9.4 MG/DL (ref 8.6–10)
CHLORIDE SERPL-SCNC: 102 MMOL/L (ref 98–107)
CHOLEST SERPL-MCNC: 172 MG/DL
CREAT SERPL-MCNC: 0.73 MG/DL (ref 0.51–0.95)
DEPRECATED HCO3 PLAS-SCNC: 25 MMOL/L (ref 22–29)
EGFRCR SERPLBLD CKD-EPI 2021: >90 ML/MIN/1.73M2
ERYTHROCYTE [DISTWIDTH] IN BLOOD BY AUTOMATED COUNT: 12.5 % (ref 10–15)
GLUCOSE SERPL-MCNC: 85 MG/DL (ref 70–99)
HCT VFR BLD AUTO: 42.7 % (ref 35–47)
HDLC SERPL-MCNC: 84 MG/DL
HGB BLD-MCNC: 13.7 G/DL (ref 11.7–15.7)
LDLC SERPL CALC-MCNC: 68 MG/DL
MCH RBC QN AUTO: 26 PG (ref 26.5–33)
MCHC RBC AUTO-ENTMCNC: 32.1 G/DL (ref 31.5–36.5)
MCV RBC AUTO: 81 FL (ref 78–100)
NONHDLC SERPL-MCNC: 88 MG/DL
PLATELET # BLD AUTO: 230 10E3/UL (ref 150–450)
POTASSIUM SERPL-SCNC: 4.4 MMOL/L (ref 3.4–5.3)
PROT SERPL-MCNC: 6.8 G/DL (ref 6.4–8.3)
RBC # BLD AUTO: 5.26 10E6/UL (ref 3.8–5.2)
SODIUM SERPL-SCNC: 138 MMOL/L (ref 136–145)
TRIGL SERPL-MCNC: 98 MG/DL
WBC # BLD AUTO: 5.1 10E3/UL (ref 4–11)

## 2023-09-22 PROCEDURE — 99395 PREV VISIT EST AGE 18-39: CPT | Mod: 25 | Performed by: PHYSICIAN ASSISTANT

## 2023-09-22 PROCEDURE — 90471 IMMUNIZATION ADMIN: CPT | Performed by: PHYSICIAN ASSISTANT

## 2023-09-22 PROCEDURE — G0145 SCR C/V CYTO,THINLAYER,RESCR: HCPCS | Performed by: PHYSICIAN ASSISTANT

## 2023-09-22 PROCEDURE — 87624 HPV HI-RISK TYP POOLED RSLT: CPT | Performed by: PHYSICIAN ASSISTANT

## 2023-09-22 PROCEDURE — 80053 COMPREHEN METABOLIC PANEL: CPT | Performed by: PHYSICIAN ASSISTANT

## 2023-09-22 PROCEDURE — 36415 COLL VENOUS BLD VENIPUNCTURE: CPT | Performed by: PHYSICIAN ASSISTANT

## 2023-09-22 PROCEDURE — 80061 LIPID PANEL: CPT | Performed by: PHYSICIAN ASSISTANT

## 2023-09-22 PROCEDURE — 90686 IIV4 VACC NO PRSV 0.5 ML IM: CPT | Performed by: PHYSICIAN ASSISTANT

## 2023-09-22 PROCEDURE — 85027 COMPLETE CBC AUTOMATED: CPT | Performed by: PHYSICIAN ASSISTANT

## 2023-09-22 ASSESSMENT — ENCOUNTER SYMPTOMS
NERVOUS/ANXIOUS: 0
DYSURIA: 0
SORE THROAT: 0
HEADACHES: 0
HEMATURIA: 0
DIARRHEA: 0
HEMATOCHEZIA: 0
ABDOMINAL PAIN: 0
SHORTNESS OF BREATH: 0
FEVER: 0
EYE PAIN: 0
HEARTBURN: 0
NAUSEA: 0
MYALGIAS: 0
FREQUENCY: 0
JOINT SWELLING: 0
DIZZINESS: 0
PARESTHESIAS: 0
CHILLS: 0
PALPITATIONS: 0
COUGH: 0
WEAKNESS: 0
ARTHRALGIAS: 0
BREAST MASS: 0
CONSTIPATION: 0

## 2023-09-22 ASSESSMENT — PAIN SCALES - GENERAL: PAINLEVEL: NO PAIN (0)

## 2023-09-22 NOTE — COMMUNITY RESOURCES LIST (ENGLISH)
09/22/2023    [employer] [place] [employer]  N/A  For questions about this resource list or additional care needs, please contact your primary care clinic or care manager.  Phone: [phone]   Email: N/A   Address: [address] [place] [address]   Hours: N/A        Financial Stability       Utility payment assistance  1  [facility] Distance: 0.96 miles      In-Person, Phone/Virtual   [address] [place] [address] [address] [place] [address]  Language: English, Ghanaian, South Korean, Yoruba  Hours: Mon - Fri 8:00 AM - 4:30 PM  Fees: Free   Phone: (269) 309-4769 Email: socialseremily@[facility] Website: http://www.[employer]/residents#human-services     2  [facility] ([facility]) - [employer] - Homelessness Prevention Distance: 1.82 miles      In-Person, Phone/Virtual   [address] [place] [address]  Language: English, South Korean, Yoruba  Hours: Mon 12:00 AM - 7:00 PM , Tue 10:00 AM - 3:00 PM , Wed - Thu 10:00 AM - 2:30 PM  Fees: Free   Phone: (252) 957-1913 Email: ica@[employer].org Website: http://www.icaNeptunef.org          Important Numbers & Websites       Emergency Services   911  [facility]   311  Poison Control   (278) 611-2903  Suicide Prevention Lifeline   (863) 495-5778 (TALK)  Child Abuse Hotline   (265) 147-9285 (4-A-Child)  Sexual Assault Hotline   (267) 287-1046 (HOPE)  National Runaway Safeline   (723) 126-7008 (RUNAWAY)  All-Options Talkline   (567) 690-7022  Substance Abuse Referral   (242) 751-2048 (HELP)

## 2023-09-22 NOTE — PROGRESS NOTES
SUBJECTIVE:   CC: Joaquina is an 28 year old who presents for preventive health visit.       9/22/2023     7:16 AM   Additional Questions   Roomed by Yanick   Accompanied by N/A       Healthy Habits:     Getting at least 3 servings of Calcium per day:  Yes    Bi-annual eye exam:  Yes    Dental care twice a year:  Yes    Sleep apnea or symptoms of sleep apnea:  None    Diet:  Regular (no restrictions)    Frequency of exercise:  4-5 days/week    Duration of exercise:  45-60 minutes    Taking medications regularly:  Yes    Medication side effects:  None    Additional concerns today:  Yes    - Requests referral to Ob/gyn, plans to try to conceive starting in 2024, so would like to begin relationship with Ob/gyn now.      Social History     Tobacco Use    Smoking status: Never    Smokeless tobacco: Never   Substance Use Topics    Alcohol use: No           9/21/2023    11:06 AM   Alcohol Use   Prescreen: >3 drinks/day or >7 drinks/week? Not Applicable     Reviewed orders with patient.  Reviewed health maintenance and updated orders accordingly - Yes  BP Readings from Last 3 Encounters:   09/22/23 102/74   11/28/22 109/76   10/04/22 112/66    Wt Readings from Last 3 Encounters:   09/22/23 62.1 kg (136 lb 12.8 oz)   11/28/22 69.5 kg (153 lb 3.2 oz)   10/04/22 69.5 kg (153 lb 3.2 oz)                  Patient Active Problem List   Diagnosis    SCOTT (generalized anxiety disorder)    Panic attack    Abnormal Pap smear of cervix     Past Surgical History:   Procedure Laterality Date    COLONOSCOPY      ESOPHAGOSCOPY, GASTROSCOPY, DUODENOSCOPY (EGD), COMBINED N/A 9/7/2017    Procedure: COMBINED ESOPHAGOSCOPY, GASTROSCOPY, DUODENOSCOPY (EGD), BIOPSY SINGLE OR MULTIPLE;  gastroscopy;  Surgeon: Hollis Prather MD;  Location: [facility]       Social History     Tobacco Use    Smoking status: Never    Smokeless tobacco: Never   Substance Use Topics    Alcohol use: No     Family History   Problem Relation Age of Onset    Thyroid  Cancer Mother     Hypertension Mother     Anxiety Disorder Mother     Thyroid Disease Mother     Hyperlipidemia Father     Hypertension Sister     Thyroid Disease Sister     No Known Problems Brother     Cerebrovascular Disease Maternal Grandmother     Coronary Artery Disease Maternal Grandmother     Hypertension Maternal Grandfather     Thyroid Disease Maternal Grandfather     Colon Cancer Paternal Grandmother         at age 63     No Known Problems Other     Diabetes No family hx of            Breast Cancer Screenin/12/2021    11:05 PM 2021     5:38 PM 2021     9:24 AM   Breast CA Risk Assessment (FHS-7)   Do you have a family history of breast, colon, or ovarian cancer? Yes Yes No / Unknown    No / Unknown       Patient under 40 years of age: Routine Mammogram Screening not recommended.   Pertinent mammograms are reviewed under the imaging tab.    History of abnormal Pap smear: YES - age 30-65 PAP every 5 years with negative HPV co-testing recommended. Remote history of NIL, HPV+ pap x2; NIL, HPV- since.      10/30/2020     9:54 AM 2018     6:00 PM   PAP / HPV   PAP (Historical) NIL  NIL      Reviewed and updated as needed this visit by clinical staff   Tobacco  Allergies  Meds              Reviewed and updated as needed this visit by Provider                     Review of Systems   Constitutional:  Negative for chills and fever.   HENT:  Negative for congestion, ear pain, hearing loss and sore throat.    Eyes:  Negative for pain and visual disturbance.   Respiratory:  Negative for cough and shortness of breath.    Cardiovascular:  Negative for chest pain, palpitations and peripheral edema.   Gastrointestinal:  Negative for abdominal pain, constipation, diarrhea, heartburn, hematochezia and nausea.   Breasts:  Negative for tenderness, breast mass and discharge.   Genitourinary:  Negative for dysuria, frequency, genital sores, hematuria, pelvic pain, urgency, vaginal bleeding and  "vaginal discharge.   Musculoskeletal:  Negative for arthralgias, joint swelling and myalgias.   Skin:  Negative for rash.   Neurological:  Negative for dizziness, weakness, headaches and paresthesias.   Psychiatric/Behavioral:  Negative for mood changes. The patient is not nervous/anxious.           OBJECTIVE:   /74   Pulse 77   Temp 97  F (36.1  C) (Tympanic)   Resp 14   Ht 1.7 m (5' 6.93\")   Wt 62.1 kg (136 lb 12.8 oz)   LMP 09/06/2023   SpO2 98%   BMI 21.47 kg/m    Physical Exam  GENERAL: healthy, alert and no distress  EYES: Eyes grossly normal to inspection, PERRL and conjunctivae and sclerae normal  HENT: ear canals and TM's normal, nose and mouth without ulcers or lesions  NECK: no adenopathy, no asymmetry, masses, or scars and thyroid normal to palpation  RESP: lungs clear to auscultation - no rales, rhonchi or wheezes  CV: regular rate and rhythm, normal S1 S2, no S3 or S4, no murmur, click or rub, no peripheral edema and peripheral pulses strong   (female): normal female external genitalia, normal urethral meatus, vaginal mucosa pink, moist, well rugated, and normal cervix/adnexa/uterus without masses or discharge  MS: no gross musculoskeletal defects noted, no edema  SKIN: no suspicious lesions or rashes  NEURO: Normal strength and tone, mentation intact and speech normal  PSYCH: mentation appears normal, affect normal/bright      ASSESSMENT/PLAN:       ICD-10-CM    1. Routine general medical examination at a health care facility  Z00.00 Comprehensive metabolic panel     CBC with platelets     Comprehensive metabolic panel     CBC with platelets      2. Pre-conception counseling  Z31.69 Ob/Gyn Referral      3. Cervical cancer screening  Z12.4 Pap Screen with HPV - recommended age 30 - 65 years      4. Screening for cardiovascular condition  Z13.6 Lipid panel reflex to direct LDL Fasting     Lipid panel reflex to direct LDL Fasting        - Referral placed to ob/gyn as " requested.      COUNSELING:  Reviewed preventive health counseling, as reflected in patient instructions       Immunizations  Vaccinated for: Influenza          She reports that she has never smoked. She has never used smokeless tobacco.          Ary Avitia PA-C  [facility]

## 2023-09-26 LAB
BKR LAB AP GYN ADEQUACY: NORMAL
BKR LAB AP GYN INTERPRETATION: NORMAL
BKR LAB AP HPV REFLEX: NORMAL
BKR LAB AP PREVIOUS ABNORMAL: NORMAL
PATH REPORT.COMMENTS IMP SPEC: NORMAL
PATH REPORT.COMMENTS IMP SPEC: NORMAL
PATH REPORT.RELEVANT HX SPEC: NORMAL

## 2023-09-28 ENCOUNTER — MYC MEDICAL ADVICE (OUTPATIENT)
Dept: FAMILY MEDICINE | Facility: CLINIC | Age: 28
End: 2023-09-28
Payer: COMMERCIAL

## 2023-09-28 LAB
HUMAN PAPILLOMA VIRUS 16 DNA: NEGATIVE
HUMAN PAPILLOMA VIRUS 18 DNA: NEGATIVE
HUMAN PAPILLOMA VIRUS FINAL DIAGNOSIS: ABNORMAL
HUMAN PAPILLOMA VIRUS OTHER HR: POSITIVE

## 2023-09-29 ENCOUNTER — PATIENT OUTREACH (OUTPATIENT)
Dept: FAMILY MEDICINE | Facility: CLINIC | Age: 28
End: 2023-09-29
Payer: COMMERCIAL

## 2023-09-29 DIAGNOSIS — R87.810 CERVICAL HIGH RISK HPV (HUMAN PAPILLOMAVIRUS) TEST POSITIVE: Primary | ICD-10-CM

## 2023-10-06 DIAGNOSIS — G43.719 INTRACTABLE CHRONIC MIGRAINE WITHOUT AURA AND WITHOUT STATUS MIGRAINOSUS: Primary | ICD-10-CM

## 2023-10-10 ENCOUNTER — MYC MEDICAL ADVICE (OUTPATIENT)
Dept: FAMILY MEDICINE | Facility: CLINIC | Age: 28
End: 2023-10-10
Payer: COMMERCIAL

## 2023-10-11 DIAGNOSIS — G43.719 INTRACTABLE CHRONIC MIGRAINE WITHOUT AURA AND WITHOUT STATUS MIGRAINOSUS: ICD-10-CM

## 2023-10-11 RX ORDER — NARATRIPTAN 2.5 MG/1
2.5 TABLET ORAL
Qty: 12 TABLET | Refills: 9 | Status: SHIPPED | OUTPATIENT
Start: 2023-10-11 | End: 2024-06-21

## 2023-10-25 ASSESSMENT — HEADACHE IMPACT TEST (HIT 6)
HOW OFTEN HAVE YOU FELT FED UP OR IRRITATED BECAUSE OF YOUR HEADACHES: RARELY
HOW OFTEN DO HEADACHES LIMIT YOUR DAILY ACTIVITIES: RARELY
HIT6 TOTAL SCORE: 52
HOW OFTEN DID HEADACHS LIMIT CONCENTRATION ON WORK OR DAILY ACTIVITY: RARELY
WHEN YOU HAVE HEADACHES HOW OFTEN IS THE PAIN SEVERE: SOMETIMES
WHEN YOU HAVE A HEADACHE HOW OFTEN DO YOU WISH YOU COULD LIE DOWN: SOMETIMES
HOW OFTEN HAVE YOU FELT TOO TIRED TO WORK BECAUSE OF YOUR HEADACHES: RARELY

## 2023-10-26 ENCOUNTER — OFFICE VISIT (OUTPATIENT)
Dept: NEUROLOGY | Facility: CLINIC | Age: 28
End: 2023-10-26
Payer: COMMERCIAL

## 2023-10-26 DIAGNOSIS — G43.719 INTRACTABLE CHRONIC MIGRAINE WITHOUT AURA AND WITHOUT STATUS MIGRAINOSUS: Primary | ICD-10-CM

## 2023-10-26 PROCEDURE — 64615 CHEMODENERV MUSC MIGRAINE: CPT | Performed by: NURSE PRACTITIONER

## 2023-10-26 NOTE — LETTER
10/26/2023       RE: Ary Lutz  [address] [facility] [address]     Dear Colleague,    Thank you for referring your patient, Ary Lutz, to the [facility] NEUROLOGY CLINIC [place] at [facility]. Please see a copy of my visit note below.    PROCEDURE NOTE:    [facility]  Botulinum Toxin Procedure    Headache Neurology    October 26, 2023    Procedure:  OnabotulinumtoxinA injections for chronic migraine  Indication:  Chronic migraine    Joaquina suffers from severe intractable headaches.  She was referred by for onabotulinumtoxinA injections for headache.  Risks, benefits, and alternatives were discussed.  All questions were answered and consent given.  She decided to proceed with the injections.     Headache history, from initial consult note: 5/25/21 initial visit     Prior to initiation of botulinum toxin injections, Ms. Lutz reported 15 headache days per month, with 7-9 severe headache days per month. Her headaches are quite disabling and often interfere with her ability to function normally.    Date of last injections: 7/27/2023    Ms. Lutz reports 5-6 headache days in 90 days currently, with zero severe headache days per month.  She has noticed a wearing off phenomenon prior to this round of botulinum toxin injections, lasting 2 weeks.    Botulinum toxin injections have improved their functioning. Able to function better -social and work as headaches controlled    She has attempted other migraine prophylactic treatments in the past, which have included:  : verapamil, topiramate, fluoxetine, gabapentin.  Beta blocker has been avoided due to depression hx.      She currently takes  for headache prevention. fluoxetine, tizanidine, rimegepant    Ms. Roses pain was assessed prior to the procedure.  She rated her pain today as 0 out of 10.    Procedural Pause: Procedural pause was conducted to verify correct patient  identity, procedure to be performed, correct side and site, correct patient position, and special requirements. Appropriate hand hygiene was utilized, and each injection site was prepped with alcohol wipe or Chloraprep swab.     Procedure Details: 200 units of onabotulinumtoxinA was diluted in 4 mL 0.9% normal saline. A total of 155 units of onabotulinumtoxinA were injected using 30 gauge 0.5 in needles into the muscles listed below. 45 units of onabotulinumtoxinA were wasted.       GOAL OF PROCEDURE:  The goal of this procedure is to decrease pain and enhance functional independence.      CONSENT:  The risks, benefits, and treatment options were discussed with the patient who agreed to proceed.    Written consent was obtained     EQUIPMENT USED:  Needles-30 gauge, 0.5 inches for injections  Four 1-ml tuberculin syringes for injections  One sodium chloride 10 ml vial preservative free  Alcohol swabs    SKIN PREPARATION:  Skin preparation was performed using an alcohol wipe.      AREA/MUSCLE INJECTED:  155 units of Botox  Right upper Trapezius (upper cervical) - 5 units of Botox at 3 site/s.   Left upper Trapezius (upper cervical) - 5 units of Botox at 3 site/s.     Right cervical paraspinals - 5 units of Botox at 2 site/s.   Left cervical paraspinals - 5 units of Botox at 2 site/s.     Left occipitalis - 5 units of Botox at 3 site/s.  Right occipitalis -5 units of Botox at 3 site/s    Right Frontalis - 5 units of Botox at 2 site/s.  Left Frontalis - 5 units of Botox at 2 site/s.    Right Temporalis - 5 units of Botox at 4 site/s.  Left Temporalis - 5 units of Botox at 4 site/s.    Right  - 5 units of Botox at 1 site/s.              Left  - 5 units of Botox at 1 site/s.    Procerus - 5 units of Botox at 1 site/s.    RESPONSE TO PROCEDURE:  tolerated the procedure well and there were no immediate complications.  Patient was allowed to recover for an appropriate period of time and was discharged  home in stable condition.    FOLLOW UP:    Repeat Botox injections in 12 weeks      This procedure was performed under a hospital privileging agreement with Dr. Whipple        Again, thank you for allowing me to participate in the care of your patient.      Sincerely,    ROBE Vargas CNP

## 2023-11-23 DIAGNOSIS — F41.0 PANIC ATTACK: ICD-10-CM

## 2023-11-24 RX ORDER — HYDROXYZINE HYDROCHLORIDE 25 MG/1
TABLET, FILM COATED ORAL
Qty: 60 TABLET | Refills: 0 | Status: SHIPPED | OUTPATIENT
Start: 2023-11-24 | End: 2024-06-06

## 2023-11-29 ENCOUNTER — E-VISIT (OUTPATIENT)
Dept: URGENT CARE | Facility: CLINIC | Age: 28
End: 2023-11-29
Payer: COMMERCIAL

## 2023-11-29 DIAGNOSIS — R21 RASH: Primary | ICD-10-CM

## 2023-11-29 PROCEDURE — 99207 PR NON-BILLABLE SERV PER CHARTING: CPT | Performed by: NURSE PRACTITIONER

## 2023-11-30 ENCOUNTER — MYC MEDICAL ADVICE (OUTPATIENT)
Dept: FAMILY MEDICINE | Facility: CLINIC | Age: 28
End: 2023-11-30
Payer: COMMERCIAL

## 2023-11-30 NOTE — TELEPHONE ENCOUNTER
Please see mychart from patient and advise appropriate course of action.    Please see Evsit from 11/29 agree with rec for in person eval?    Carlos Spear RN  [facility] Triage Nurse

## 2023-11-30 NOTE — PATIENT INSTRUCTIONS
Dear Ary Lutz,    We are sorry you are not feeling well. Based on the responses you provided, it is recommended that you be seen in-person in urgent care so we can better evaluate your symptoms. Please click here to find the nearest urgent care location to you.   You will not be charged for this Visit. Thank you for trusting us with your care.    Farhana Zacarias, CNP

## 2023-12-01 ENCOUNTER — E-VISIT (OUTPATIENT)
Dept: FAMILY MEDICINE | Facility: CLINIC | Age: 28
End: 2023-12-01
Payer: COMMERCIAL

## 2023-12-01 DIAGNOSIS — L25.9 CONTACT DERMATITIS, UNSPECIFIED CONTACT DERMATITIS TYPE, UNSPECIFIED TRIGGER: Primary | ICD-10-CM

## 2023-12-01 PROCEDURE — 99421 OL DIG E/M SVC 5-10 MIN: CPT | Performed by: PHYSICIAN ASSISTANT

## 2023-12-01 RX ORDER — TRIAMCINOLONE ACETONIDE 5 MG/G
CREAM TOPICAL 2 TIMES DAILY
Qty: 30 G | Refills: 1 | Status: SHIPPED | OUTPATIENT
Start: 2023-12-01 | End: 2024-08-30

## 2023-12-15 ENCOUNTER — MYC MEDICAL ADVICE (OUTPATIENT)
Dept: FAMILY MEDICINE | Facility: CLINIC | Age: 28
End: 2023-12-15
Payer: COMMERCIAL

## 2023-12-15 DIAGNOSIS — K21.9 GASTROESOPHAGEAL REFLUX DISEASE WITHOUT ESOPHAGITIS: Primary | ICD-10-CM

## 2023-12-15 NOTE — TELEPHONE ENCOUNTER
Provider, please read the patient My Chart message and advise the triage staff.     Catherine Sorenson RN  [facility]

## 2023-12-19 NOTE — TELEPHONE ENCOUNTER
Patient Contact     Attempt # 1     Was call answered?    No  Left non-detailed message to call the clinic back at [phone]. Sent [employer] message as well     Yareli Oconnor RN

## 2024-01-05 ENCOUNTER — TELEPHONE (OUTPATIENT)
Dept: NEUROLOGY | Facility: CLINIC | Age: 29
End: 2024-01-05
Payer: COMMERCIAL

## 2024-01-08 ENCOUNTER — LAB (OUTPATIENT)
Dept: LAB | Facility: CLINIC | Age: 29
End: 2024-01-08
Payer: COMMERCIAL

## 2024-01-08 DIAGNOSIS — K21.9 GASTROESOPHAGEAL REFLUX DISEASE WITHOUT ESOPHAGITIS: ICD-10-CM

## 2024-01-08 PROCEDURE — 87338 HPYLORI STOOL AG IA: CPT

## 2024-01-09 LAB — H PYLORI AG STL QL IA: NEGATIVE

## 2024-01-10 NOTE — RESULT ENCOUNTER NOTE
Ary-  Here are your recent results.     Your H pylori test is negative.  No treatment is needed    Yanick Baca PA-C

## 2024-01-22 ENCOUNTER — OFFICE VISIT (OUTPATIENT)
Dept: NEUROLOGY | Facility: CLINIC | Age: 29
End: 2024-01-22
Payer: COMMERCIAL

## 2024-01-22 DIAGNOSIS — G43.719 INTRACTABLE CHRONIC MIGRAINE WITHOUT AURA AND WITHOUT STATUS MIGRAINOSUS: Primary | ICD-10-CM

## 2024-01-22 PROCEDURE — 64615 CHEMODENERV MUSC MIGRAINE: CPT | Performed by: NURSE PRACTITIONER

## 2024-01-22 NOTE — LETTER
1/22/2024       RE: Ary Lutz  [address] [facility] [address]       Dear Colleague,    Thank you for referring your patient, Ary Lutz, to the [facility]UR[facility] [place] at [facility]. Please see a copy of my visit note below.    PROCEDURE NOTE:     [facility]  Botulinum Toxin Procedure     Headache Neurology     1/22/2024    Procedure:  OnabotulinumtoxinA injections for chronic migraine  Indication:  Chronic migraine     Joaquina suffers from severe intractable headaches.  She was referred by for onabotulinumtoxinA injections for headache.  Risks, benefits, and alternatives were discussed.  All questions were answered and consent given.  She decided to proceed with the injections.      Headache history, from initial consult note: 5/25/21 initial visit      Prior to initiation of botulinum toxin injections, Ms. Lutz reported 15 headache days per month, with 7-9 severe headache days per month. Her headaches are quite disabling and often interfere with her ability to function normally.     Date of last injections: October 26, 2023     Ms. Lutz reports 3-4 headache days/month currently, with 3 severe headache days per month.  She has noticed a wearing off phenomenon prior to this round of botulinum toxin injections, lasting 0 weeks.     Botulinum toxin injections have improved their functioning. Able to function better -social and work as headaches controlled     She has attempted other migraine prophylactic treatments in the past, which have included:  : verapamil, topiramate, fluoxetine, gabapentin.  Beta blocker has been avoided due to depression hx.       She currently takes  for headache prevention. fluoxetine, tizanidine, rimegepant     Ms. Roses pain was assessed prior to the procedure.  She rated her pain today as 0 out of 10.     Procedural Pause: Procedural pause was conducted to verify correct patient  identity, procedure to be performed, correct side and site, correct patient position, and special requirements. Appropriate hand hygiene was utilized, and each injection site was prepped with alcohol wipe or Chloraprep swab.      Procedure Details: 200 units of onabotulinumtoxinA was diluted in 4 mL 0.9% normal saline. A total of 155 units of onabotulinumtoxinA were injected using 30 gauge 0.5 in needles into the muscles listed below. 45 units of onabotulinumtoxinA were wasted.         GOAL OF PROCEDURE:  The goal of this procedure is to decrease pain and enhance functional independence.        CONSENT:  The risks, benefits, and treatment options were discussed with the patient who agreed to proceed.     Written consent was obtained      EQUIPMENT USED:  Needles-30 gauge, 0.5 inches for injections  Four 1-ml tuberculin syringes for injections  One sodium chloride 10 ml vial preservative free  Alcohol swabs     SKIN PREPARATION:  Skin preparation was performed using an alcohol wipe.        AREA/MUSCLE INJECTED:  155 units of Botox  Right upper Trapezius (upper cervical) - 5 units of Botox at 3 site/s.   Left upper Trapezius (upper cervical) - 5 units of Botox at 3 site/s.      Right cervical paraspinals - 5 units of Botox at 2 site/s.   Left cervical paraspinals - 5 units of Botox at 2 site/s.      Left occipitalis - 5 units of Botox at 3 site/s.  Right occipitalis -5 units of Botox at 3 site/s     Right Frontalis - 5 units of Botox at 2 site/s.  Left Frontalis - 5 units of Botox at 2 site/s.     Right Temporalis - 5 units of Botox at 4 site/s.  Left Temporalis - 5 units of Botox at 4 site/s.     Right  - 5 units of Botox at 1 site/s.              Left  - 5 units of Botox at 1 site/s.     Procerus - 5 units of Botox at 1 site/s.     RESPONSE TO PROCEDURE:  tolerated the procedure well and there were no immediate complications.  Patient was allowed to recover for an appropriate period of time and  was discharged home in stable condition.     FOLLOW UP:     Repeat Botox injections in 12 weeks        This procedure was performed under a hospital privileging agreement with Dr. Whipple         Again, thank you for allowing me to participate in the care of your patient.      Sincerely,    ROBE Vargas CNP

## 2024-01-22 NOTE — PROGRESS NOTES
PROCEDURE NOTE:     [facility]  Botulinum Toxin Procedure     Headache Neurology     1/22/2024    Procedure:  OnabotulinumtoxinA injections for chronic migraine  Indication:  Chronic migraine     Joaquina suffers from severe intractable headaches.  She was referred by for onabotulinumtoxinA injections for headache.  Risks, benefits, and alternatives were discussed.  All questions were answered and consent given.  She decided to proceed with the injections.      Headache history, from initial consult note: 5/25/21 initial visit      Prior to initiation of botulinum toxin injections, Ms. Lutz reported 15 headache days per month, with 7-9 severe headache days per month. Her headaches are quite disabling and often interfere with her ability to function normally.     Date of last injections: October 26, 2023     Ms. Lutz reports 3-4 headache days/month currently, with 3 severe headache days per month.  She has noticed a wearing off phenomenon prior to this round of botulinum toxin injections, lasting 0 weeks.     Botulinum toxin injections have improved their functioning. Able to function better -social and work as headaches controlled     She has attempted other migraine prophylactic treatments in the past, which have included:  : verapamil, topiramate, fluoxetine, gabapentin.  Beta blocker has been avoided due to depression hx.       She currently takes  for headache prevention. fluoxetine, tizanidine, rimegepant     Ms. Roses pain was assessed prior to the procedure.  She rated her pain today as 0 out of 10.     Procedural Pause: Procedural pause was conducted to verify correct patient identity, procedure to be performed, correct side and site, correct patient position, and special requirements. Appropriate hand hygiene was utilized, and each injection site was prepped with alcohol wipe or Chloraprep swab.      Procedure Details: 200 units of onabotulinumtoxinA was diluted in 4 mL 0.9% normal saline.  A total of 155 units of onabotulinumtoxinA were injected using 30 gauge 0.5 in needles into the muscles listed below. 45 units of onabotulinumtoxinA were wasted.         GOAL OF PROCEDURE:  The goal of this procedure is to decrease pain and enhance functional independence.        CONSENT:  The risks, benefits, and treatment options were discussed with the patient who agreed to proceed.     Written consent was obtained      EQUIPMENT USED:  Needles-30 gauge, 0.5 inches for injections  Four 1-ml tuberculin syringes for injections  One sodium chloride 10 ml vial preservative free  Alcohol swabs     SKIN PREPARATION:  Skin preparation was performed using an alcohol wipe.        AREA/MUSCLE INJECTED:  155 units of Botox  Right upper Trapezius (upper cervical) - 5 units of Botox at 3 site/s.   Left upper Trapezius (upper cervical) - 5 units of Botox at 3 site/s.      Right cervical paraspinals - 5 units of Botox at 2 site/s.   Left cervical paraspinals - 5 units of Botox at 2 site/s.      Left occipitalis - 5 units of Botox at 3 site/s.  Right occipitalis -5 units of Botox at 3 site/s     Right Frontalis - 5 units of Botox at 2 site/s.  Left Frontalis - 5 units of Botox at 2 site/s.     Right Temporalis - 5 units of Botox at 4 site/s.  Left Temporalis - 5 units of Botox at 4 site/s.     Right  - 5 units of Botox at 1 site/s.              Left  - 5 units of Botox at 1 site/s.     Procerus - 5 units of Botox at 1 site/s.     RESPONSE TO PROCEDURE:  tolerated the procedure well and there were no immediate complications.  Patient was allowed to recover for an appropriate period of time and was discharged home in stable condition.     FOLLOW UP:     Repeat Botox injections in 12 weeks        This procedure was performed under a hospital privileging agreement with Dr. Sarabjit Shea, ROBE, CNP  [facility]

## 2024-02-20 ENCOUNTER — TELEPHONE (OUTPATIENT)
Dept: NEUROLOGY | Facility: CLINIC | Age: 29
End: 2024-02-20
Payer: COMMERCIAL

## 2024-02-20 NOTE — TELEPHONE ENCOUNTER
Prior Authorization Retail Medication Request  New Insurance     Medication/Dose: Nurtec 75 mg  ICD code (if different than what is on RX):  G43.019  Previously Tried and Failed:  naratriptan, sumatriptan and zolmitriptans, Hydroxyzine and prozac-anxiety/depression  Verapamil 40 mg twice daily but does not help and no difference     Rationale:  Episodic migraine. Recommend nurtec at every other day dosing to help prevent episodic migraine. Pt has 9 headache days per month.      Insurance NAME:  [employer]   Insurance ID [id number]      Pharmacy Information (if different than what is on RX)  Name:    Phone:

## 2024-02-20 NOTE — TELEPHONE ENCOUNTER
PRIOR AUTHORIZATION DENIED    Medication: NURTEC 75 MG PO TBDP  Insurance Company: [employer] - Phone [phone] Fax [phone]  Denial Date: 2/20/2024  Denial Reason(s): Needs to try/fail Emgality and Qulipta      Appeal Information:   Patient Notified: No

## 2024-02-20 NOTE — TELEPHONE ENCOUNTER
PA Initiation    Medication: NURTEC 75 MG PO TBDP  Insurance Company: [employer] - Phone [phone] Fax [phone]  Pharmacy [facility]illing the Rx[id number] [place] [facility] [place], [place] - [email or web address]  Filling Pharmacy Phone: [phone]  Filling Pharmacy Fax:    Start Date: 2/20/2024

## 2024-02-21 NOTE — TELEPHONE ENCOUNTER
Pt updated with Nurtec denial via [employer]. Awaiting response on how she would like to proceed. Already tried and failed Emgality.

## 2024-02-22 ENCOUNTER — TELEPHONE (OUTPATIENT)
Dept: NEUROLOGY | Facility: CLINIC | Age: 29
End: 2024-02-22
Payer: COMMERCIAL

## 2024-02-22 DIAGNOSIS — G43.719 INTRACTABLE CHRONIC MIGRAINE WITHOUT AURA AND WITHOUT STATUS MIGRAINOSUS: Primary | ICD-10-CM

## 2024-02-22 RX ORDER — ATOGEPANT 60 MG/1
60 TABLET ORAL DAILY
Qty: 30 TABLET | Refills: 11 | Status: SHIPPED | OUTPATIENT
Start: 2024-02-22 | End: 2024-03-20

## 2024-02-22 NOTE — TELEPHONE ENCOUNTER
Prior Authorization Retail Medication Request       Medication/Dose: Atogepant (QULIPTA) 60 MG TABS  ICD code (if different than what is on RX):  G43.019  Previously Tried and Failed:  naratriptan, sumatriptan and zolmitriptans, Hydroxyzine and prozac-anxiety/depression  Verapamil 40 mg twice daily but does not help and no difference   Emgality  Nurtec - not covered  BBB - contraindicated  Topirimate  Gbapentin  Fluoxetine    Rationale:  Episodic migraine. Recommend nurtec at every other day dosing to help prevent episodic migraine. Pt has 9 headache days per month.      Insurance NAME:  [employer]   Insurance ID [id number]      Pharmacy Information (if different than what is on RX)  Name:    Phone:

## 2024-03-04 NOTE — TELEPHONE ENCOUNTER
Retail Pharmacy Prior Authorization Team   Phone: [phone]    PA Initiation    Medication: Atogepant (QULIPTA) 60 MG TABS  Insurance Company:    Ph[id number] [place] [facility] [place], [place] - [address]  Filling Pharmacy Phone: [phone]  Filling Pharmacy Fax: [phone]  Start Date: 3/4/2024

## 2024-03-05 NOTE — TELEPHONE ENCOUNTER
Retail Pharmacy Prior Authorization Team   Phone: [phone]    Received call from patient notifying that she will most likely have updated insurance information by the end of the week. She will contact the pharmacy and us with her new insurance info.

## 2024-03-05 NOTE — TELEPHONE ENCOUNTER
Unable to submit PA at this time.  Patient's insurance termed.  Spoke with pharmacy, they do not have current insurance.  Pharmacy tried to contact patient, left VM.  I was unable to reach patient either.  I left a VM for patient advising her to contact both the pharmacy and PA team with current insurance information.  Until we get patient's current insurance information we are unable to submit.

## 2024-03-06 ENCOUNTER — TELEPHONE (OUTPATIENT)
Dept: NEUROLOGY | Facility: CLINIC | Age: 29
End: 2024-03-06
Payer: COMMERCIAL

## 2024-03-06 NOTE — TELEPHONE ENCOUNTER
Pharmacy needs order the medication in and will contact the patient when it is ready for .   Patient was made aware of the approval.

## 2024-03-06 NOTE — TELEPHONE ENCOUNTER
Patient called back with new insurance- it is the same insurance just reinstated.      New PA request created for tracking and reporting purposes.

## 2024-03-06 NOTE — TELEPHONE ENCOUNTER
Central Prior Authorization Team   Phone: [phone]    PA Initiation    Medication: Qulipta 60mg   Insurance Company: [employer] - Phone [phone] Fax [phone]  [id number] [place] [facility] [place] - [address]  Filling Pharmacy Phone: [phone]  Filling Pharmacy Fax:    Start Date: 3/6/2024

## 2024-03-06 NOTE — TELEPHONE ENCOUNTER
Prior Authorization Approval    Authorization Effective Date: 3/6/2024  Authorization Expiration Date: 6/4/2024  Medication: Qulipta 60mg   Approved Dose/Quantity:   Reference #:     Insurance Company: [employer] - Phone [phone] Fax [phone]  Expected CoPay:       CoPay Card Available:      Foundation Assistance Needed:    Which Pharmacy is filling the prescription (Not needed for infusion/clinic administered): [facility] [id number] [place] [facility] [place] [facility] [place] [address]  Pharmacy Notified:  yes  Patient Notified:  yes- Pharmacy will contact patient when ready to /ship

## 2024-03-19 ENCOUNTER — TELEPHONE (OUTPATIENT)
Dept: NEUROLOGY | Facility: CLINIC | Age: 29
End: 2024-03-19
Payer: COMMERCIAL

## 2024-03-19 DIAGNOSIS — G43.019 INTRACTABLE MIGRAINE WITHOUT AURA AND WITHOUT STATUS MIGRAINOSUS: ICD-10-CM

## 2024-03-19 DIAGNOSIS — G43.019 INTRACTABLE MIGRAINE WITHOUT AURA AND WITHOUT STATUS MIGRAINOSUS: Primary | ICD-10-CM

## 2024-03-19 NOTE — TELEPHONE ENCOUNTER
PA was approved for 16 per 30 days.     Will forward to clinic care team to see if they want to send in a new Rx for the quanitity 16 per 30 days or just keep as is with the Rx quantity 8 per 30 days

## 2024-03-19 NOTE — TELEPHONE ENCOUNTER
Central Prior Authorization Team   Phone: [phone]    PA Initiation    Medication: Nurtec ODT 75mg  Insurance Company: [employer] - Phone [phone] Fax [phone]  [facility] [id number] [place] [facility] [place] - [address]  Filling Pharmacy Phone: [phone]  Filling Pharmacy Fax:    Start Date: 3/19/2024

## 2024-03-19 NOTE — TELEPHONE ENCOUNTER
Prior Authorization Approval    Authorization Effective Date: 3/19/2024  Authorization Expiration Date: 3/19/2025  Medication: Nurtec ODT 75mg  Approved Dose/Quantity:   Reference #:     Insurance Company: [employer] - Phone [phone] Fax [phone]  Expected CoPay:       CoPay Card Available:      Foundation Assistance Needed:    Which Pharmacy is filling the prescription (Not needed for infusion/clinic administered): [facility] [id number] [place] [facility] [place] [facility] [place] [address]  Pharmacy Notified:  yes  Patient Notified:  yes- Pharmacy will contact patient when ready to /ship

## 2024-03-19 NOTE — TELEPHONE ENCOUNTER
Spoke to Joaquina. She is wanting Qulipta discontinued and Nurtec to be ordered every other day for preventative since this has been the most effective treatment. Relayed to Coretta to update Rx to 16 tablets.

## 2024-03-19 NOTE — TELEPHONE ENCOUNTER
Prior Authorization Retail Medication Request - NURTEC ACUTE    Medication/Dose: Nurtec 75 mg, acute  ICD code (if different than what is on RX):   Previously Tried and Failed:  naratriptan, sumatriptan and zolmitriptans, Hydroxyzine    Naproxen  Ibuprofen  Zofran  Rizatriptan    Rationale: acute migraine     Insurance NAME:  [employer]   Insurance ID [id number]      Pharmacy Information (if different than what is on RX)  Name:    Phone:

## 2024-03-20 NOTE — TELEPHONE ENCOUNTER
Stop qulipta. Start Nurtec 75 mg SL every 48 hours for prevention. Prescription updated. Thank you

## 2024-04-11 DIAGNOSIS — F41.1 GAD (GENERALIZED ANXIETY DISORDER): ICD-10-CM

## 2024-04-11 RX ORDER — FLUOXETINE 40 MG/1
40 CAPSULE ORAL DAILY
Qty: 90 CAPSULE | Refills: 0 | Status: SHIPPED | OUTPATIENT
Start: 2024-04-11 | End: 2024-07-10

## 2024-04-15 ENCOUNTER — OFFICE VISIT (OUTPATIENT)
Dept: NEUROLOGY | Facility: CLINIC | Age: 29
End: 2024-04-15
Payer: COMMERCIAL

## 2024-04-15 DIAGNOSIS — G43.719 INTRACTABLE CHRONIC MIGRAINE WITHOUT AURA AND WITHOUT STATUS MIGRAINOSUS: Primary | ICD-10-CM

## 2024-04-15 DIAGNOSIS — Z30.41 ENCOUNTER FOR SURVEILLANCE OF CONTRACEPTIVE PILLS: ICD-10-CM

## 2024-04-15 PROCEDURE — 64615 CHEMODENERV MUSC MIGRAINE: CPT | Performed by: NURSE PRACTITIONER

## 2024-04-15 NOTE — PROGRESS NOTES
PROCEDURE NOTE:     [facility]  Botulinum Toxin Procedure     Headache Neurology     04/15/24    Procedure:  OnabotulinumtoxinA injections for chronic migraine  Indication:  Chronic migraine     Joaquina suffers from severe intractable headaches.  She was referred by for onabotulinumtoxinA injections for headache.  Risks, benefits, and alternatives were discussed.  All questions were answered and consent given.  She decided to proceed with the injections.      Headache history, from initial consult note: 5/25/21 initial visit      Prior to initiation of botulinum toxin injections, Ms. Lutz reported 15 headache days per month, with 7-9 severe headache days per month. Her headaches are quite disabling and often interfere with her ability to function normally.     Date of last injections:  1/22/2024    Ms. Lutz reports 4 headache days/month currently, with 3 severe headache days per month.  She has noticed a wearing off phenomenon prior to this round of botulinum toxin injections, lasting 0 weeks.     Botulinum toxin injections have improved their functioning. Able to function better -social and work as headaches controlled     She has attempted other migraine prophylactic treatments in the past, which have included:  : verapamil, topiramate, fluoxetine, gabapentin.  Beta blocker has been avoided due to depression hx.       She currently takes  for headache prevention. fluoxetine, tizanidine, rimegepant     Ms. Roses pain was assessed prior to the procedure.  She rated her pain today as 0 out of 10.     Procedural Pause: Procedural pause was conducted to verify correct patient identity, procedure to be performed, correct side and site, correct patient position, and special requirements. Appropriate hand hygiene was utilized, and each injection site was prepped with alcohol wipe or Chloraprep swab.      Procedure Details: 200 units of onabotulinumtoxinA was diluted in 4 mL 0.9% normal saline. A total of  155 units of onabotulinumtoxinA were injected using 30 gauge 0.5 in needles into the muscles listed below. 45 units of onabotulinumtoxinA were wasted.         GOAL OF PROCEDURE:  The goal of this procedure is to decrease pain and enhance functional independence.        CONSENT:  The risks, benefits, and treatment options were discussed with the patient who agreed to proceed.     Written consent was obtained      EQUIPMENT USED:  Needles-30 gauge, 0.5 inches for injections  Four 1-ml tuberculin syringes for injections  One sodium chloride 10 ml vial preservative free  Alcohol swabs     SKIN PREPARATION:  Skin preparation was performed using an alcohol wipe.        AREA/MUSCLE INJECTED:  155 units of Botox  Right upper Trapezius (upper cervical) - 5 units of Botox at 3 site/s.   Left upper Trapezius (upper cervical) - 5 units of Botox at 3 site/s.      Right cervical paraspinals - 5 units of Botox at 2 site/s.   Left cervical paraspinals - 5 units of Botox at 2 site/s.      Left occipitalis - 5 units of Botox at 3 site/s.  Right occipitalis -5 units of Botox at 3 site/s     Right Frontalis - 5 units of Botox at 2 site/s.  Left Frontalis - 5 units of Botox at 2 site/s.     Right Temporalis - 5 units of Botox at 4 site/s.  Left Temporalis - 5 units of Botox at 4 site/s.     Right  - 5 units of Botox at 1 site/s.              Left  - 5 units of Botox at 1 site/s.     Procerus - 5 units of Botox at 1 site/s.     RESPONSE TO PROCEDURE:  tolerated the procedure well and there were no immediate complications.  Patient was allowed to recover for an appropriate period of time and was discharged home in stable condition.     FOLLOW UP:     Repeat Botox injections in 12 weeks        This procedure was performed under a hospital privileging agreement with Dr. Sarabjit Shea, APRN, CNP  [facility]

## 2024-04-15 NOTE — LETTER
4/15/2024       RE: Ary Lutz  [address] [facility] [address]     Dear Colleague,    Thank you for referring your patient, Ary Lutz, to the [facility] [place] [facility]. Please see a copy of my visit note below.    PROCEDURE NOTE:     [facility]  Botulinum Toxin Procedure     Headache Neurology     04/15/24    Procedure:  OnabotulinumtoxinA injections for chronic migraine  Indication:  Chronic migraine     Joaquina suffers from severe intractable headaches.  She was referred by for onabotulinumtoxinA injections for headache.  Risks, benefits, and alternatives were discussed.  All questions were answered and consent given.  She decided to proceed with the injections.      Headache history, from initial consult note: 5/25/21 initial visit      Prior to initiation of botulinum toxin injections, Ms. Lutz reported 15 headache days per month, with 7-9 severe headache days per month. Her headaches are quite disabling and often interfere with her ability to function normally.     Date of last injections:  1/22/2024    Ms. Lutz reports 4 headache days/month currently, with 3 severe headache days per month.  She has noticed a wearing off phenomenon prior to this round of botulinum toxin injections, lasting 0 weeks.     Botulinum toxin injections have improved their functioning. Able to function better -social and work as headaches controlled     She has attempted other migraine prophylactic treatments in the past, which have included:  : verapamil, topiramate, fluoxetine, gabapentin.  Beta blocker has been avoided due to depression hx.       She currently takes  for headache prevention. fluoxetine, tizanidine, rimegepant     Ms. Roses pain was assessed prior to the procedure.  She rated her pain today as 0 out of 10.     Procedural Pause: Procedural pause was conducted to verify correct patient identity,  procedure to be performed, correct side and site, correct patient position, and special requirements. Appropriate hand hygiene was utilized, and each injection site was prepped with alcohol wipe or Chloraprep swab.      Procedure Details: 200 units of onabotulinumtoxinA was diluted in 4 mL 0.9% normal saline. A total of 155 units of onabotulinumtoxinA were injected using 30 gauge 0.5 in needles into the muscles listed below. 45 units of onabotulinumtoxinA were wasted.         GOAL OF PROCEDURE:  The goal of this procedure is to decrease pain and enhance functional independence.        CONSENT:  The risks, benefits, and treatment options were discussed with the patient who agreed to proceed.     Written consent was obtained      EQUIPMENT USED:  Needles-30 gauge, 0.5 inches for injections  Four 1-ml tuberculin syringes for injections  One sodium chloride 10 ml vial preservative free  Alcohol swabs     SKIN PREPARATION:  Skin preparation was performed using an alcohol wipe.        AREA/MUSCLE INJECTED:  155 units of Botox  Right upper Trapezius (upper cervical) - 5 units of Botox at 3 site/s.   Left upper Trapezius (upper cervical) - 5 units of Botox at 3 site/s.      Right cervical paraspinals - 5 units of Botox at 2 site/s.   Left cervical paraspinals - 5 units of Botox at 2 site/s.      Left occipitalis - 5 units of Botox at 3 site/s.  Right occipitalis -5 units of Botox at 3 site/s     Right Frontalis - 5 units of Botox at 2 site/s.  Left Frontalis - 5 units of Botox at 2 site/s.     Right Temporalis - 5 units of Botox at 4 site/s.  Left Temporalis - 5 units of Botox at 4 site/s.     Right  - 5 units of Botox at 1 site/s.              Left  - 5 units of Botox at 1 site/s.     Procerus - 5 units of Botox at 1 site/s.     RESPONSE TO PROCEDURE:  tolerated the procedure well and there were no immediate complications.  Patient was allowed to recover for an appropriate period of time and was  discharged home in stable condition.     FOLLOW UP:     Repeat Botox injections in 12 weeks        This procedure was performed under a hospital privileging agreement with Dr. Whipple          Again, thank you for allowing me to participate in the care of your patient.      Sincerely,    ROBE Vargas CNP

## 2024-04-16 RX ORDER — DROSPIRENONE AND ETHINYL ESTRADIOL 0.02-3(28)
1 KIT ORAL DAILY
Qty: 84 TABLET | Refills: 3 | OUTPATIENT
Start: 2024-04-16

## 2024-05-03 ENCOUNTER — VIRTUAL VISIT (OUTPATIENT)
Dept: FAMILY MEDICINE | Facility: CLINIC | Age: 29
End: 2024-05-03
Payer: COMMERCIAL

## 2024-05-03 DIAGNOSIS — Z30.09 ENCOUNTER FOR OTHER GENERAL COUNSELING OR ADVICE ON CONTRACEPTION: Primary | ICD-10-CM

## 2024-05-03 PROCEDURE — 99214 OFFICE O/P EST MOD 30 MIN: CPT | Mod: 95 | Performed by: FAMILY MEDICINE

## 2024-05-03 NOTE — PROGRESS NOTES
Joaquina is a 28 year old who is being evaluated via a billable video visit.    How would you like to obtain your AVS? MyChart  If the video visit is dropped, the invitation should be resent by: Text to cell phone: [phone]  Will anyone else be joining your video visit? No      Assessment & Plan     Encounter for other general counseling or advice on contraception  Patient is planning to get pregnant and planning to stop her birth control pill.  Discussed with her about all the possible issues related to that.  Apparently most people do well with stopping the birth control and become for tile once they are not on birth control.  Sometimes it can take few months to have fertility back.  I have not heard any excessive acne after stopping birth control however every individual is different.  If it happen it can be treated accordingly.  I advised patient if she is planning to get pregnant she should start taking folic acid or multivitamin prenatal.  Fertility issues discussed with the patient.  If there is any issue with fertility after stopping OCP I advised her to talk to her OB/GYN for any additional testing which may be needed.  At this time there is no contraindication to stop OCPs ,as she is planning to get pregnant.                Subjective   Joaquina is a 28 year old, presenting for the following health issues:  Recheck Medication  Patient made this appointment to discuss oral contraceptive pills.  Apparently she has been taking OCP for the last 13 years.  She is planning to stop them and  have some question regarding any issues stopping and getting pregnant.  She also has history of acne and she is wondering if she will get any excessive acne by stopping the birth control pills.      5/3/2024    11:28 AM   Additional Questions   Roomed by Fuad ASHER     Medication Followup of Birth Control  Taking Medication as prescribed: yes  Side Effects:  None  Medication Helping Symptoms:  Yes - has been on OCP for the  Patient given AVS and reviewed with patient. Peripheral IV removed and catheter intact. Heart monitor 8664 cleaned and returned. Patient's spouse downstairs to provide transportation at this time.    past 13 - would like to discontinue and start trying to conceive, has questions           Review of Systems  Constitutional, HEENT, cardiovascular, pulmonary, gi and gu systems are negative, except as otherwise noted.      Objective           Vitals:  No vitals were obtained today due to virtual visit.    Physical Exam   GENERAL: alert and no distress  EYES: Eyes grossly normal to inspection.  No discharge or erythema, or obvious scleral/conjunctival abnormalities.  RESP: No audible wheeze, cough, or visible cyanosis.    SKIN: Visible skin clear. No significant rash, abnormal pigmentation or lesions.  NEURO: Cranial nerves grossly intact.  Mentation and speech appropriate for age.  PSYCH: Appropriate affect, tone, and pace of words          Video-Visit Details    Type of service:  Video Visit   Originating Location (pt. Location): Home    Distant Location (provider location):  On-site  Platform used for Video Visit: Dora  Signed Electronically by: Angel Diaz MD

## 2024-06-06 ENCOUNTER — MYC MEDICAL ADVICE (OUTPATIENT)
Dept: FAMILY MEDICINE | Facility: CLINIC | Age: 29
End: 2024-06-06
Payer: COMMERCIAL

## 2024-06-06 DIAGNOSIS — F41.0 PANIC ATTACK: ICD-10-CM

## 2024-06-10 RX ORDER — HYDROXYZINE HYDROCHLORIDE 25 MG/1
25 TABLET, FILM COATED ORAL PRN
Qty: 60 TABLET | Refills: 0 | Status: SHIPPED | OUTPATIENT
Start: 2024-06-10 | End: 2024-07-09

## 2024-06-17 ENCOUNTER — LAB REQUISITION (OUTPATIENT)
Dept: LAB | Facility: CLINIC | Age: 29
End: 2024-06-17
Payer: COMMERCIAL

## 2024-06-17 DIAGNOSIS — Z31.9 ENCOUNTER FOR PROCREATIVE MANAGEMENT, UNSPECIFIED: ICD-10-CM

## 2024-06-17 LAB
HCG INTACT+B SERPL-ACNC: 29 MIU/ML
T4 FREE SERPL-MCNC: 1.07 NG/DL (ref 0.9–1.7)
TSH SERPL DL<=0.005 MIU/L-ACNC: 2.43 UIU/ML (ref 0.3–4.2)

## 2024-06-17 PROCEDURE — 84439 ASSAY OF FREE THYROXINE: CPT | Mod: ORL | Performed by: OBSTETRICS & GYNECOLOGY

## 2024-06-17 PROCEDURE — 84443 ASSAY THYROID STIM HORMONE: CPT | Mod: ORL | Performed by: OBSTETRICS & GYNECOLOGY

## 2024-06-17 PROCEDURE — 84702 CHORIONIC GONADOTROPIN TEST: CPT | Mod: ORL | Performed by: OBSTETRICS & GYNECOLOGY

## 2024-06-19 ENCOUNTER — LAB REQUISITION (OUTPATIENT)
Dept: LAB | Facility: CLINIC | Age: 29
End: 2024-06-19
Payer: COMMERCIAL

## 2024-06-19 DIAGNOSIS — Z32.01 ENCOUNTER FOR PREGNANCY TEST, RESULT POSITIVE: ICD-10-CM

## 2024-06-19 PROCEDURE — 84702 CHORIONIC GONADOTROPIN TEST: CPT | Mod: ORL | Performed by: OBSTETRICS & GYNECOLOGY

## 2024-06-20 LAB — HCG INTACT+B SERPL-ACNC: 106 MIU/ML

## 2024-06-24 ENCOUNTER — LAB REQUISITION (OUTPATIENT)
Dept: LAB | Facility: CLINIC | Age: 29
End: 2024-06-24
Payer: COMMERCIAL

## 2024-06-24 DIAGNOSIS — Z32.01 ENCOUNTER FOR PREGNANCY TEST, RESULT POSITIVE: ICD-10-CM

## 2024-06-24 PROCEDURE — 84702 CHORIONIC GONADOTROPIN TEST: CPT | Mod: ORL | Performed by: OBSTETRICS & GYNECOLOGY

## 2024-06-25 LAB — HCG INTACT+B SERPL-ACNC: 711 MIU/ML

## 2024-06-26 ENCOUNTER — LAB REQUISITION (OUTPATIENT)
Dept: LAB | Facility: CLINIC | Age: 29
End: 2024-06-26
Payer: COMMERCIAL

## 2024-06-26 DIAGNOSIS — Z32.01 ENCOUNTER FOR PREGNANCY TEST, RESULT POSITIVE: ICD-10-CM

## 2024-06-26 PROCEDURE — 84702 CHORIONIC GONADOTROPIN TEST: CPT | Mod: ORL | Performed by: OBSTETRICS & GYNECOLOGY

## 2024-06-27 LAB — HCG INTACT+B SERPL-ACNC: 1700 MIU/ML

## 2024-07-07 DIAGNOSIS — F41.1 GAD (GENERALIZED ANXIETY DISORDER): ICD-10-CM

## 2024-07-07 DIAGNOSIS — F41.0 PANIC ATTACK: ICD-10-CM

## 2024-07-09 RX ORDER — HYDROXYZINE HYDROCHLORIDE 25 MG/1
25 TABLET, FILM COATED ORAL PRN
Qty: 90 TABLET | Refills: 1 | Status: SHIPPED | OUTPATIENT
Start: 2024-07-09

## 2024-07-10 RX ORDER — FLUOXETINE 40 MG/1
40 CAPSULE ORAL DAILY
Qty: 90 CAPSULE | Refills: 0 | Status: SHIPPED | OUTPATIENT
Start: 2024-07-10 | End: 2024-09-30

## 2024-07-15 ENCOUNTER — LAB REQUISITION (OUTPATIENT)
Dept: LAB | Facility: CLINIC | Age: 29
End: 2024-07-15
Payer: COMMERCIAL

## 2024-07-15 DIAGNOSIS — R00.2 PALPITATIONS: ICD-10-CM

## 2024-07-15 LAB
ERYTHROCYTE [DISTWIDTH] IN BLOOD BY AUTOMATED COUNT: 13.1 % (ref 10–15)
HCT VFR BLD AUTO: 39.5 % (ref 35–47)
HGB BLD-MCNC: 13.2 G/DL (ref 11.7–15.7)
MCH RBC QN AUTO: 27.9 PG (ref 26.5–33)
MCHC RBC AUTO-ENTMCNC: 33.4 G/DL (ref 31.5–36.5)
MCV RBC AUTO: 84 FL (ref 78–100)
PLATELET # BLD AUTO: 249 10E3/UL (ref 150–450)
RBC # BLD AUTO: 4.73 10E6/UL (ref 3.8–5.2)
WBC # BLD AUTO: 8.5 10E3/UL (ref 4–11)

## 2024-07-15 PROCEDURE — 85027 COMPLETE CBC AUTOMATED: CPT | Mod: ORL | Performed by: PHYSICIAN ASSISTANT

## 2024-07-15 PROCEDURE — 80053 COMPREHEN METABOLIC PANEL: CPT | Mod: ORL | Performed by: PHYSICIAN ASSISTANT

## 2024-07-15 PROCEDURE — 84443 ASSAY THYROID STIM HORMONE: CPT | Mod: ORL | Performed by: PHYSICIAN ASSISTANT

## 2024-07-16 LAB
ALBUMIN SERPL BCG-MCNC: 4.3 G/DL (ref 3.5–5.2)
ALP SERPL-CCNC: 64 U/L (ref 40–150)
ALT SERPL W P-5'-P-CCNC: 22 U/L (ref 0–50)
ANION GAP SERPL CALCULATED.3IONS-SCNC: 12 MMOL/L (ref 7–15)
AST SERPL W P-5'-P-CCNC: 22 U/L (ref 0–45)
BILIRUB SERPL-MCNC: 0.7 MG/DL
BUN SERPL-MCNC: 13.1 MG/DL (ref 6–20)
CALCIUM SERPL-MCNC: 9.4 MG/DL (ref 8.8–10.4)
CHLORIDE SERPL-SCNC: 102 MMOL/L (ref 98–107)
CREAT SERPL-MCNC: 0.59 MG/DL (ref 0.51–0.95)
EGFRCR SERPLBLD CKD-EPI 2021: >90 ML/MIN/1.73M2
GLUCOSE SERPL-MCNC: 92 MG/DL (ref 70–99)
HCO3 SERPL-SCNC: 23 MMOL/L (ref 22–29)
POTASSIUM SERPL-SCNC: 4.2 MMOL/L (ref 3.4–5.3)
PROT SERPL-MCNC: 7.2 G/DL (ref 6.4–8.3)
SODIUM SERPL-SCNC: 137 MMOL/L (ref 135–145)
TSH SERPL DL<=0.005 MIU/L-ACNC: 1.59 UIU/ML (ref 0.3–4.2)

## 2024-08-05 ENCOUNTER — LAB REQUISITION (OUTPATIENT)
Dept: LAB | Facility: CLINIC | Age: 29
End: 2024-08-05
Payer: COMMERCIAL

## 2024-08-05 DIAGNOSIS — Z36.89 ENCOUNTER FOR OTHER SPECIFIED ANTENATAL SCREENING: ICD-10-CM

## 2024-08-05 LAB
BASOPHILS # BLD AUTO: 0 10E3/UL (ref 0–0.2)
BASOPHILS NFR BLD AUTO: 0 %
EOSINOPHIL # BLD AUTO: 0.1 10E3/UL (ref 0–0.7)
EOSINOPHIL NFR BLD AUTO: 1 %
ERYTHROCYTE [DISTWIDTH] IN BLOOD BY AUTOMATED COUNT: 13.4 % (ref 10–15)
HBA1C MFR BLD: 4.9 %
HBV SURFACE AG SERPL QL IA: NONREACTIVE
HCT VFR BLD AUTO: 38.8 % (ref 35–47)
HCV AB SERPL QL IA: NONREACTIVE
HGB BLD-MCNC: 13.1 G/DL (ref 11.7–15.7)
HIV 1+2 AB+HIV1 P24 AG SERPL QL IA: NONREACTIVE
IMM GRANULOCYTES # BLD: 0 10E3/UL
IMM GRANULOCYTES NFR BLD: 0 %
LYMPHOCYTES # BLD AUTO: 1.6 10E3/UL (ref 0.8–5.3)
LYMPHOCYTES NFR BLD AUTO: 17 %
MCH RBC QN AUTO: 28.1 PG (ref 26.5–33)
MCHC RBC AUTO-ENTMCNC: 33.8 G/DL (ref 31.5–36.5)
MCV RBC AUTO: 83 FL (ref 78–100)
MONOCYTES # BLD AUTO: 0.5 10E3/UL (ref 0–1.3)
MONOCYTES NFR BLD AUTO: 5 %
NEUTROPHILS # BLD AUTO: 7.1 10E3/UL (ref 1.6–8.3)
NEUTROPHILS NFR BLD AUTO: 77 %
NRBC # BLD AUTO: 0 10E3/UL
NRBC BLD AUTO-RTO: 0 /100
PLATELET # BLD AUTO: 237 10E3/UL (ref 150–450)
RBC # BLD AUTO: 4.66 10E6/UL (ref 3.8–5.2)
WBC # BLD AUTO: 9.3 10E3/UL (ref 4–11)

## 2024-08-05 PROCEDURE — 87086 URINE CULTURE/COLONY COUNT: CPT | Mod: ORL | Performed by: OBSTETRICS & GYNECOLOGY

## 2024-08-05 PROCEDURE — 83036 HEMOGLOBIN GLYCOSYLATED A1C: CPT | Mod: ORL | Performed by: OBSTETRICS & GYNECOLOGY

## 2024-08-05 PROCEDURE — 86803 HEPATITIS C AB TEST: CPT | Mod: ORL | Performed by: OBSTETRICS & GYNECOLOGY

## 2024-08-05 PROCEDURE — 80081 OBSTETRIC PANEL INC HIV TSTG: CPT | Mod: ORL | Performed by: OBSTETRICS & GYNECOLOGY

## 2024-08-06 LAB
ABO/RH(D): NORMAL
ANTIBODY SCREEN: NEGATIVE
RPR SER QL: NONREACTIVE
RUBV IGG SERPL QL IA: 0.86 INDEX
RUBV IGG SERPL QL IA: NORMAL
SPECIMEN EXPIRATION DATE: NORMAL

## 2024-08-07 LAB — BACTERIA UR CULT: NORMAL

## 2024-08-24 DIAGNOSIS — F41.1 GAD (GENERALIZED ANXIETY DISORDER): ICD-10-CM

## 2024-08-30 ENCOUNTER — OFFICE VISIT (OUTPATIENT)
Dept: FAMILY MEDICINE | Facility: CLINIC | Age: 29
End: 2024-08-30
Payer: COMMERCIAL

## 2024-08-30 VITALS
WEIGHT: 154.9 LBS | OXYGEN SATURATION: 97 % | HEART RATE: 89 BPM | HEIGHT: 67 IN | TEMPERATURE: 98.1 F | BODY MASS INDEX: 24.31 KG/M2 | RESPIRATION RATE: 15 BRPM | DIASTOLIC BLOOD PRESSURE: 73 MMHG | SYSTOLIC BLOOD PRESSURE: 104 MMHG

## 2024-08-30 DIAGNOSIS — J01.10 SUBACUTE FRONTAL SINUSITIS: Primary | ICD-10-CM

## 2024-08-30 DIAGNOSIS — H61.23 BILATERAL IMPACTED CERUMEN: ICD-10-CM

## 2024-08-30 PROCEDURE — 99213 OFFICE O/P EST LOW 20 MIN: CPT | Performed by: INTERNAL MEDICINE

## 2024-08-30 ASSESSMENT — PAIN SCALES - GENERAL: PAINLEVEL: MODERATE PAIN (4)

## 2024-08-30 NOTE — PROGRESS NOTES
"  Assessment & Plan     Subacute frontal sinusitis  Has had some congestion symptoms since pregnancy started.  Gradually worsened.  Most notable over the last 2 to 3 weeks.    She notes specifically difficulty with sleeping due to nasal congestion.  I have a low suspicion for bacterial sinusitis.  I suggest continuing the over-the-counter antihistamine and adding Flonase or similar.    Bilateral impacted cerumen  Not likely the cause of above but may be contributing.  Cerumen removed by way of lavage by support staff.        Subjective   Joaquina is a 29 year old, presenting for the following health issues:  Sinus Problem    Pt is new to me.    She is 15 weeks pregnant.      For the last week has noted nasal and frontal pressure as well as PND and stuffy nose.  These sx have been most pronounced over the last month.      Has taken benadryl, zyrtec and tylenol.      She has had sinus infections in the past but none recently.  No sick contacts    No fever.    No B/B changes reported.      History of Present Illness       Reason for visit:  Sinus pain  Symptom onset:  3-7 days ago  Symptoms include:  Sinus pressure in face, chest tightness/heart pain  Symptom intensity:  Mild  Symptom progression:  Staying the same  Had these symptoms before:  No  What makes it worse:  Standing  What makes it better:  No   She is taking medications regularly.               Objective    /73 (BP Location: Left arm, Patient Position: Sitting, Cuff Size: Adult Regular)   Pulse 89   Temp 98.1  F (36.7  C) (Temporal)   Resp 15   Ht 1.7 m (5' 6.93\")   Wt 70.3 kg (154 lb 14.4 oz)   LMP 04/17/2024 (Exact Date)   SpO2 97%   BMI 24.31 kg/m    Body mass index is 24.31 kg/m .  Physical Exam   GEN NAD  ENT TM with cerumen imp B  B turbinates inflames, R more than L  +PP erythema (mild) without exudates)  CV RRR  CHEST CTA B  PSYCH alert pleasant and cooperative.            Signed Electronically by: Sumanth Concepcion MD    "

## 2024-09-04 ENCOUNTER — LAB REQUISITION (OUTPATIENT)
Dept: LAB | Facility: CLINIC | Age: 29
End: 2024-09-04
Payer: COMMERCIAL

## 2024-09-04 DIAGNOSIS — R39.9 UNSPECIFIED SYMPTOMS AND SIGNS INVOLVING THE GENITOURINARY SYSTEM: ICD-10-CM

## 2024-09-04 DIAGNOSIS — Z13.79 ENCOUNTER FOR OTHER SCREENING FOR GENETIC AND CHROMOSOMAL ANOMALIES: ICD-10-CM

## 2024-09-04 PROCEDURE — 87086 URINE CULTURE/COLONY COUNT: CPT | Mod: ORL | Performed by: STUDENT IN AN ORGANIZED HEALTH CARE EDUCATION/TRAINING PROGRAM

## 2024-09-04 PROCEDURE — 82105 ALPHA-FETOPROTEIN SERUM: CPT | Mod: ORL | Performed by: STUDENT IN AN ORGANIZED HEALTH CARE EDUCATION/TRAINING PROGRAM

## 2024-09-05 PROBLEM — R87.619 ABNORMAL PAP SMEAR OF CERVIX: Status: ACTIVE | Noted: 2020-02-01

## 2024-09-05 PROBLEM — R87.810 CERVICAL HIGH RISK HPV (HUMAN PAPILLOMAVIRUS) TEST POSITIVE: Status: ACTIVE | Noted: 2020-02-01

## 2024-09-06 DIAGNOSIS — R07.9 CHEST PAIN: Primary | ICD-10-CM

## 2024-09-06 LAB
# FETUSES US: NORMAL
AFP MOM SERPL: 0.9
AFP SERPL-MCNC: 29 NG/ML
AGE - REPORTED: 31.7 YR
BACTERIA UR CULT: NORMAL
CURRENT SMOKER: NO
FAMILY MEMBER DISEASES HX: NO
GA METHOD: NORMAL
GA: NORMAL WK
IDDM PATIENT QL: NO
INTEGRATED SCN PATIENT-IMP: NORMAL
SPECIMEN DRAWN SERPL: NORMAL

## 2024-09-17 ASSESSMENT — ANXIETY QUESTIONNAIRES
GAD7 TOTAL SCORE: 8
8. IF YOU CHECKED OFF ANY PROBLEMS, HOW DIFFICULT HAVE THESE MADE IT FOR YOU TO DO YOUR WORK, TAKE CARE OF THINGS AT HOME, OR GET ALONG WITH OTHER PEOPLE?: SOMEWHAT DIFFICULT
7. FEELING AFRAID AS IF SOMETHING AWFUL MIGHT HAPPEN: SEVERAL DAYS

## 2024-09-18 ENCOUNTER — VIRTUAL VISIT (OUTPATIENT)
Dept: FAMILY MEDICINE | Facility: CLINIC | Age: 29
End: 2024-09-18
Payer: COMMERCIAL

## 2024-09-18 DIAGNOSIS — F41.9 ANXIETY: Primary | ICD-10-CM

## 2024-09-18 PROCEDURE — 99213 OFFICE O/P EST LOW 20 MIN: CPT | Mod: 95 | Performed by: PHYSICIAN ASSISTANT

## 2024-09-18 NOTE — PROGRESS NOTES
Joaquina is a 29 year old who is being evaluated via a billable video visit.    How would you like to obtain your AVS? [employer]  If the video visit is dropped, the invitation should be resent by: Text to cell phone: [phone]  Will anyone else be joining your video visit? No      Assessment & Plan       Anxiety  Increase prozac to 60 mg daily Follow up in 2-4 weeks via [email or web address] message if changes are needed.  Otherwise ok to follow up in 6 months  - FLUoxetine (PROZAC) 20 MG capsule; Take 20 mg along with 40 mg ( TDD 60 mg daily)            Follow up as above    Subjective   Joaquina is a 29 year old, presenting for the following health issues:  Recheck Medication and Anxiety        9/18/2024     7:24 AM   Additional Questions   Roomed by Ольга HOWARD     History of Present Illness       Mental Health Follow-up:  Patient presents to follow-up on Anxiety.    Patient's anxiety since last visit has been:  Worse  The patient is having other symptoms associated with anxiety.  Any significant life events: other  Patient is feeling anxious or having panic attacks.  Patient has no concerns about alcohol or drug use.    She eats 2-3 servings of fruits and vegetables daily.She consumes 0 sweetened beverage(s) daily.She exercises with enough effort to increase her heart rate 30 to 60 minutes per day.  She exercises with enough effort to increase her heart rate 5 days per week.   She is taking medications regularly.       Medication Followup of Fluoxetine   Taking Medication as prescribed: yes  Side Effects:  None  Medication Helping Symptoms:  pt looking for an increased dose      Joaquina is currently pregnant with her first child. Pregnancy is healthy thus far.  She takes Prozac 40 mg daily for anxiety.  Has been feeling more anxious during this pregnancy and her OB/GYN suggested she consider a dose adjustment.  She would like to increase her Prozac at this time.  She sees a therapist weekly and will continue this.       Review of  Systems  Constitutional, HEENT, cardiovascular, pulmonary, GI, , musculoskeletal, neuro, skin, endocrine and psych systems are negative, except as otherwise noted.      Objective    Vitals - Patient Reported  Pain Score: No Pain (0)      Vitals:  No vitals were obtained today due to virtual visit.    Physical Exam   GENERAL: alert and no distress  EYES: Eyes grossly normal to inspection.  No discharge or erythema, or obvious scleral/conjunctival abnormalities.  RESP: No audible wheeze, cough, or visible cyanosis.    SKIN: Visible skin clear. No significant rash, abnormal pigmentation or lesions.  NEURO: Cranial nerves grossly intact.  Mentation and speech appropriate for age.  PSYCH: Appropriate affect, tone, and pace of words          Video-Visit Details    Type of service:  Video Visit   Originating Location (pt. Location): Home    Distant Location (provider location):  On-site  Platform used for Video Visit: Dora  Signed Electronically by: Yanick Baca PA-C

## 2024-09-28 DIAGNOSIS — F41.1 GAD (GENERALIZED ANXIETY DISORDER): ICD-10-CM

## 2024-09-30 RX ORDER — FLUOXETINE 40 MG/1
40 CAPSULE ORAL DAILY
Qty: 90 CAPSULE | Refills: 0 | Status: SHIPPED | OUTPATIENT
Start: 2024-09-30

## 2024-10-13 ENCOUNTER — MYC MEDICAL ADVICE (OUTPATIENT)
Dept: FAMILY MEDICINE | Facility: CLINIC | Age: 29
End: 2024-10-13
Payer: COMMERCIAL

## 2024-10-29 ENCOUNTER — OFFICE VISIT (OUTPATIENT)
Dept: FAMILY MEDICINE | Facility: CLINIC | Age: 29
End: 2024-10-29
Payer: COMMERCIAL

## 2024-10-29 VITALS
HEART RATE: 92 BPM | RESPIRATION RATE: 18 BRPM | WEIGHT: 168.9 LBS | BODY MASS INDEX: 26.51 KG/M2 | OXYGEN SATURATION: 100 % | HEIGHT: 67 IN | TEMPERATURE: 98 F

## 2024-10-29 DIAGNOSIS — R06.02 SHORTNESS OF BREATH: ICD-10-CM

## 2024-10-29 DIAGNOSIS — R00.2 PALPITATIONS: ICD-10-CM

## 2024-10-29 DIAGNOSIS — R07.89 ATYPICAL CHEST PAIN: Primary | ICD-10-CM

## 2024-10-29 DIAGNOSIS — R06.09 EXERTIONAL DYSPNEA: ICD-10-CM

## 2024-10-29 DIAGNOSIS — Z34.92 SECOND TRIMESTER PREGNANCY: ICD-10-CM

## 2024-10-29 PROCEDURE — 99214 OFFICE O/P EST MOD 30 MIN: CPT | Performed by: INTERNAL MEDICINE

## 2024-10-29 RX ORDER — PANTOPRAZOLE SODIUM 20 MG/1
1 TABLET, DELAYED RELEASE ORAL
COMMUNITY
Start: 2024-09-24

## 2024-10-29 ASSESSMENT — PAIN SCALES - GENERAL: PAINLEVEL_OUTOF10: NO PAIN (0)

## 2024-10-29 NOTE — PROGRESS NOTES
"  Assessment & Plan     Patient has been dealing with intermittent left anterior chest wall discomfort with occasional radiation to her left shoulder blade.  Sometimes associated with palpitations and shortness of breath particular she is laying down on her left side.  This has been going on she feels like since she became pregnant.    She has had some evaluations to date as noted below.  She has had 2 ER visits.  The one from September 10 included a D-dimer which was negative.    Given the high negative predictive value I do not feel like another D-dimer or further assessment for thromboembolic disease is indicated.    I think it is reasonable to obtain a transthoracic echocardiogram to check for pericardial effusion etc.    Should this be normal I think we can offer further reassurance to the patient.    Atypical chest pain    - Echocardiogram Complete    Palpitations    - Echocardiogram Complete        BMI  Estimated body mass index is 26.57 kg/m  as calculated from the following:    Height as of this encounter: 1.698 m (5' 6.85\").    Weight as of this encounter: 76.6 kg (168 lb 14.4 oz).         Subjective   Joaquina is a 29 year old, presenting for the following health issues:  Chest Pain (Tightness maybe be anxiety  over left breast and shoulder blade radiating pain scale 5 ongoing since July pain is worse at night and when laying down taking tylenol for pain but doesnt seem to help )        10/29/2024     1:57 PM   Additional Questions   Roomed by Juvenal GALINDO [id number]    She c/o L anterior wall chest pain that will radiate to the L shoulder blade.  Seen in outside ER .      Subsequently saw ob gyn and was told maybe GERD.  [place] is was a little helpful for GERD but did not help with the chest discomfort.     Since has also been seen for increased anxiety.  Selective serotonin reuptake inhibitor was increased last month.      She notes intermittent shortness of breath but this is not associated with the " "chest pain/shoulder blade pain.      Sx are worse when she lies on her L side and associated with palpitations    These sx have been occurring since pregnancy.  Has not worsened with time but has not improved either.          History of Present Illness       Reason for visit:  Chest pressure  Symptom onset:  More than a month  Symptoms include:  Tight chest pain when laying down, heart skips a beat, shortness of breath  Symptom intensity:  Moderate  Symptom progression:  Staying the same  Had these symptoms before:  No  What makes it worse:  Anxiety  What makes it better:  Sleeping   She is taking medications regularly.               Objective    Pulse 92   Temp 98  F (36.7  C) (Temporal)   Resp 18   Ht 1.698 m (5' 6.85\")   Wt 76.6 kg (168 lb 14.4 oz)   LMP 04/17/2024 (Exact Date)   SpO2 100%   BMI 26.57 kg/m    Body mass index is 26.57 kg/m .  Physical Exam   GENERAL: alert and no distress, anxious  NECK: no adenopathy, no asymmetry, masses, or scars  RESP: lungs clear to auscultation - no rales, rhonchi or wheezes  CV: regular rate and rhythm, normal S1 S2, no S3 or S4, no murmur, click or rub, no peripheral edema  ABDOMEN: soft, nontender, no hepatosplenomegaly, no masses and bowel sounds normal  MS: no gross musculoskeletal defects noted, no edema            Signed Electronically by: Sumanth Concepcion MD    "

## 2024-10-29 NOTE — PATIENT INSTRUCTIONS
Schedule an ECHO    [facility]:    [phone]    They are located off the lobby of [facility], [address]

## 2024-11-06 ENCOUNTER — ANCILLARY PROCEDURE (OUTPATIENT)
Dept: CARDIOLOGY | Facility: CLINIC | Age: 29
End: 2024-11-06
Attending: INTERNAL MEDICINE
Payer: COMMERCIAL

## 2024-11-06 DIAGNOSIS — R06.09 EXERTIONAL DYSPNEA: ICD-10-CM

## 2024-11-06 DIAGNOSIS — Z34.92 SECOND TRIMESTER PREGNANCY: ICD-10-CM

## 2024-11-06 DIAGNOSIS — R06.02 SHORTNESS OF BREATH: ICD-10-CM

## 2024-11-06 DIAGNOSIS — R00.2 PALPITATIONS: ICD-10-CM

## 2024-11-06 DIAGNOSIS — R07.89 ATYPICAL CHEST PAIN: ICD-10-CM

## 2024-11-06 LAB — LVEF ECHO: NORMAL

## 2024-11-06 PROCEDURE — 93306 TTE W/DOPPLER COMPLETE: CPT | Mod: GC | Performed by: INTERNAL MEDICINE

## 2024-11-12 ENCOUNTER — MYC MEDICAL ADVICE (OUTPATIENT)
Dept: FAMILY MEDICINE | Facility: CLINIC | Age: 29
End: 2024-11-12
Payer: COMMERCIAL

## 2024-11-28 ENCOUNTER — HOSPITAL ENCOUNTER (OUTPATIENT)
Facility: CLINIC | Age: 29
Discharge: HOME | End: 2024-11-28
Attending: OBSTETRICS & GYNECOLOGY | Admitting: OBSTETRICS & GYNECOLOGY
Payer: COMMERCIAL

## 2024-11-28 VITALS — SYSTOLIC BLOOD PRESSURE: 114 MMHG | TEMPERATURE: 98.4 F | RESPIRATION RATE: 18 BRPM | DIASTOLIC BLOOD PRESSURE: 73 MMHG

## 2024-11-28 PROBLEM — Z36.89 ENCOUNTER FOR TRIAGE IN PREGNANT PATIENT: Status: ACTIVE | Noted: 2024-11-28

## 2024-11-28 LAB
ALBUMIN UR-MCNC: NEGATIVE MG/DL
APPEARANCE UR: CLEAR
BACTERIA #/AREA URNS HPF: ABNORMAL /HPF
BILIRUB UR QL STRIP: NEGATIVE
CLUE CELLS: ABNORMAL
COLOR UR AUTO: ABNORMAL
FFN SPECIMEN INTEGRITY: NORMAL
FIBRONECTIN FETAL VAG QL: NEGATIVE
GLUCOSE UR STRIP-MCNC: NEGATIVE MG/DL
HGB UR QL STRIP: NEGATIVE
KETONES UR STRIP-MCNC: NEGATIVE MG/DL
LEUKOCYTE ESTERASE UR QL STRIP: NEGATIVE
MUCOUS THREADS #/AREA URNS LPF: PRESENT /LPF
NITRATE UR QL: NEGATIVE
PH UR STRIP: 7 [PH] (ref 5–7)
RBC URINE: <1 /HPF
RUPTURE OF FETAL MEMBRANES BY ROM PLUS: NEGATIVE
SP GR UR STRIP: 1.01 (ref 1–1.03)
SQUAMOUS EPITHELIAL: 1 /HPF
TRICHOMONAS, WET PREP: ABNORMAL
UROBILINOGEN UR STRIP-MCNC: NORMAL MG/DL
WBC URINE: 1 /HPF
WBC'S/HIGH POWER FIELD, WET PREP: ABNORMAL
YEAST, WET PREP: ABNORMAL

## 2024-11-28 PROCEDURE — 87210 SMEAR WET MOUNT SALINE/INK: CPT | Performed by: OBSTETRICS & GYNECOLOGY

## 2024-11-28 PROCEDURE — G0463 HOSPITAL OUTPT CLINIC VISIT: HCPCS

## 2024-11-28 PROCEDURE — 81003 URINALYSIS AUTO W/O SCOPE: CPT | Performed by: OBSTETRICS & GYNECOLOGY

## 2024-11-28 PROCEDURE — 82731 ASSAY OF FETAL FIBRONECTIN: CPT | Performed by: OBSTETRICS & GYNECOLOGY

## 2024-11-28 PROCEDURE — 84112 EVAL AMNIOTIC FLUID PROTEIN: CPT | Performed by: OBSTETRICS & GYNECOLOGY

## 2024-11-28 RX ORDER — VITAMIN A, VITAMIN C, VITAMIN D-3, VITAMIN E, VITAMIN B-1, VITAMIN B-2, NIACIN, VITAMIN B-6, CALCIUM, IRON, ZINC, COPPER 4000; 120; 400; 22; 1.84; 3; 20; 10; 1; 12; 200; 27; 25; 2 [IU]/1; MG/1; [IU]/1; MG/1; MG/1; MG/1; MG/1; MG/1; MG/1; UG/1; MG/1; MG/1; MG/1; MG/1
TABLET ORAL DAILY
COMMUNITY

## 2024-11-28 ASSESSMENT — ACTIVITIES OF DAILY LIVING (ADL)
ADLS_ACUITY_SCORE: 18
ADLS_ACUITY_SCORE: 18

## 2024-11-28 NOTE — PLAN OF CARE
Report received from Jacinda Marquis RN and care of the pt assumed.     Upon assessment, Joaquina reports continued occasional cramping. Abdomen palpates soft and on tender. No UCs on the toco. EFM adjusted. Fht's 145.     Negative ROM+ and negative Wet Prep resulted. Awaiting UA results.

## 2024-11-28 NOTE — PLAN OF CARE
SVE perfomred with pts verbal consent. Closed/30-40%/-4 posterior.     Discharge instructions and  labor precautions reviewed at length with Joaquina and Juancho and questions answered. Joaquina verbalizes understanding of when to seek further evaluation.     Pt d/c'd home ambulatory

## 2024-11-28 NOTE — PROVIDER NOTIFICATION
11/28/24 0800   Provider Notification   Provider Name/Title Dr. Martinez   Method of Notification Phone   Request Evaluate - Remote   Notification Reason Lab/Diagnostic Study      Dr. Martinez updated regarding fht's 145 with moderate variability, gestationally appropriate accels present and no decels, uterine irritability every 5+ minutes per pt report and on tocometer, RN unable to palpate, Negative ROM+, FFN and wet prep. Order received to perform SVE and discharge if closed.

## 2024-11-28 NOTE — DISCHARGE INSTRUCTIONS
Learning About When to Call Your Doctor During Pregnancy (After 20 Weeks)  Overview  It's common to have concerns about what might be a problem when you're pregnant. Most pregnancies don't have any serious problems. But it's still important to know when to call your doctor if you have certain symptoms or signs of labor.  These are general suggestions. Your doctor may give you some more information about when to call.  When to call your doctor (after 20 weeks)  Call 911  anytime you think you may need emergency care. For example, call if:  You have severe vaginal bleeding. This means you are soaking through a pad each hour for 2 or more hours.  You have sudden, severe pain in your belly.  You have chest pain, are short of breath, or cough up blood.  You passed out (lost consciousness).  You have a seizure.  You see or feel the umbilical cord.  You think you are about to deliver your baby and can't make it safely to the hospital or birthing center.  Call your doctor now or seek immediate medical care if:  You have vaginal bleeding.  You have belly pain.  You have a fever.  You are dizzy or lightheaded, or you feel like you may faint.  You have signs of a blood clot in your leg (called a deep vein thrombosis), such as:  Pain in the calf, back of the knee, thigh, or groin.  Swelling in your leg or groin.  A color change on the leg or groin. The skin may be reddish or purplish, depending on your usual skin color.  You have symptoms of preeclampsia, such as:  Sudden swelling of your face, hands, or feet.  New vision problems (such as dimness, blurring, or seeing spots).  A severe headache.  You have a sudden release of fluid from your vagina. (You think your water broke.)  You've been having regular contractions for an hour. This means that you've had at least 6 contractions within 1 hour, even after you change your position and drink fluids.  You notice that your baby has stopped moving or is moving less than  "normal.  You have signs of heart failure, such as:  New or increased shortness of breath.  New or worse swelling in your legs, ankles, or feet.  Sudden weight gain, such as more than 2 to 3 pounds in a day or 5 pounds in a week.  Feeling so tired or weak that you cannot do your usual activities.  You have symptoms of a urinary tract infection. These may include:  Pain or burning when you urinate.  A frequent need to urinate without being able to pass much urine.  Pain in the flank, which is just below the rib cage and above the waist on either side of the back.  Blood in your urine.  Watch closely for changes in your health, and be sure to contact your doctor if:  You have vaginal discharge that smells bad.  You feel sad, anxious, or hopeless for more than a few days.  You have skin changes, such as a rash, itching, or a yellow color to your skin.  You have other concerns about your pregnancy.  If you have labor signs at 37 weeks or more  If you have signs of labor at 37 weeks or more, your doctor may tell you to call when your labor becomes more active. Symptoms of active labor include:  Contractions that are regular.  Contractions that are less than 5 minutes apart.  Contractions that are hard to talk through.  Follow-up care is a key part of your treatment and safety. Be sure to make and go to all appointments, and call your doctor if you are having problems. It's also a good idea to know your test results and keep a list of the medicines you take.  Where can you learn more?  Go to https://www.[facility].net/patiented  Enter N531 in the search box to learn more about \"Learning About When to Call Your Doctor During Pregnancy (After 20 Weeks).\"  Current as of: July 10, 2023  Content Version: 14.2 2024 [facility].   Care instructions adapted under license by your healthcare professional. If you have questions about a medical condition or this instruction, always ask your healthcare professional. " [employer] disclaims any warranty or liability for your use of this information.

## 2024-11-28 NOTE — PROGRESS NOTES
"Joaquina Lutz [place] [facility] [facility] for evaluation of pre-term labor.  Pt states she has been lilian for a couple hours. Also reports having an increase in \"watery like\" discharge. Discussed plan of care including EFM , routine VS, FFN, wet prep, UA and ROM+.  Pt agreeable.  EFM applied and admission assessment completed.    "

## 2024-12-03 ENCOUNTER — LAB REQUISITION (OUTPATIENT)
Dept: LAB | Facility: CLINIC | Age: 29
End: 2024-12-03
Payer: COMMERCIAL

## 2024-12-03 DIAGNOSIS — Z36.89 ENCOUNTER FOR OTHER SPECIFIED ANTENATAL SCREENING: ICD-10-CM

## 2024-12-03 LAB
ANTIBODY SCREEN: NEGATIVE
ERYTHROCYTE [DISTWIDTH] IN BLOOD BY AUTOMATED COUNT: 12.9 % (ref 10–15)
HCT VFR BLD AUTO: 38.9 % (ref 35–47)
HGB BLD-MCNC: 12.4 G/DL (ref 11.7–15.7)
MCH RBC QN AUTO: 26.8 PG (ref 26.5–33)
MCHC RBC AUTO-ENTMCNC: 31.9 G/DL (ref 31.5–36.5)
MCV RBC AUTO: 84 FL (ref 78–100)
PLATELET # BLD AUTO: 235 10E3/UL (ref 150–450)
RBC # BLD AUTO: 4.62 10E6/UL (ref 3.8–5.2)
SPECIMEN EXPIRATION DATE: NORMAL
WBC # BLD AUTO: 12.4 10E3/UL (ref 4–11)

## 2024-12-04 LAB — T PALLIDUM AB SER QL: NONREACTIVE

## 2024-12-17 ENCOUNTER — LAB REQUISITION (OUTPATIENT)
Dept: LAB | Facility: CLINIC | Age: 29
End: 2024-12-17
Payer: COMMERCIAL

## 2024-12-17 DIAGNOSIS — Z33.1 PREGNANT STATE, INCIDENTAL: ICD-10-CM

## 2024-12-17 LAB
FFN SPECIMEN INTEGRITY: NORMAL
FIBRONECTIN FETAL VAG QL: NEGATIVE

## 2025-01-03 ENCOUNTER — LAB REQUISITION (OUTPATIENT)
Dept: LAB | Facility: CLINIC | Age: 30
End: 2025-01-03
Payer: COMMERCIAL

## 2025-01-03 DIAGNOSIS — F41.9 ANXIETY DISORDER, UNSPECIFIED: ICD-10-CM

## 2025-01-03 LAB
FFN SPECIMEN INTEGRITY: NORMAL
FIBRONECTIN FETAL VAG QL: NEGATIVE

## 2025-01-03 PROCEDURE — 82731 ASSAY OF FETAL FIBRONECTIN: CPT | Mod: ORL | Performed by: OBSTETRICS & GYNECOLOGY

## 2025-01-08 DIAGNOSIS — F41.0 PANIC ATTACK: ICD-10-CM

## 2025-01-08 RX ORDER — HYDROXYZINE HYDROCHLORIDE 25 MG/1
25 TABLET, FILM COATED ORAL PRN
Qty: 90 TABLET | Refills: 1 | Status: SHIPPED | OUTPATIENT
Start: 2025-01-08

## 2025-01-12 ENCOUNTER — HEALTH MAINTENANCE LETTER (OUTPATIENT)
Age: 30
End: 2025-01-12

## 2025-02-04 ENCOUNTER — ANESTHESIA (OUTPATIENT)
Dept: OBGYN | Facility: CLINIC | Age: 30
End: 2025-02-04
Payer: COMMERCIAL

## 2025-02-04 ENCOUNTER — HOSPITAL ENCOUNTER (INPATIENT)
Facility: CLINIC | Age: 30
LOS: 2 days | Discharge: HOME | End: 2025-02-06
Attending: OBSTETRICS & GYNECOLOGY | Admitting: OBSTETRICS & GYNECOLOGY
Payer: COMMERCIAL

## 2025-02-04 ENCOUNTER — ANESTHESIA EVENT (OUTPATIENT)
Dept: OBGYN | Facility: CLINIC | Age: 30
End: 2025-02-04
Payer: COMMERCIAL

## 2025-02-04 PROBLEM — Z36.89 ENCOUNTER FOR TRIAGE IN PREGNANT PATIENT: Status: ACTIVE | Noted: 2024-11-28

## 2025-02-04 LAB
ABO + RH BLD: NORMAL
BLD GP AB SCN SERPL QL: NEGATIVE
ERYTHROCYTE [DISTWIDTH] IN BLOOD BY AUTOMATED COUNT: 13.9 % (ref 10–15)
HCT VFR BLD AUTO: 36.9 % (ref 35–47)
HGB BLD-MCNC: 12 G/DL (ref 11.7–15.7)
MCH RBC QN AUTO: 25.3 PG (ref 26.5–33)
MCHC RBC AUTO-ENTMCNC: 32.5 G/DL (ref 31.5–36.5)
MCV RBC AUTO: 78 FL (ref 78–100)
PLATELET # BLD AUTO: 209 10E3/UL (ref 150–450)
RBC # BLD AUTO: 4.75 10E6/UL (ref 3.8–5.2)
SPECIMEN EXP DATE BLD: NORMAL
T PALLIDUM AB SER QL: NONREACTIVE
WBC # BLD AUTO: 11.8 10E3/UL (ref 4–11)

## 2025-02-04 PROCEDURE — 85018 HEMOGLOBIN: CPT | Performed by: OBSTETRICS & GYNECOLOGY

## 2025-02-04 PROCEDURE — 86780 TREPONEMA PALLIDUM: CPT | Performed by: OBSTETRICS & GYNECOLOGY

## 2025-02-04 PROCEDURE — 86850 RBC ANTIBODY SCREEN: CPT | Performed by: OBSTETRICS & GYNECOLOGY

## 2025-02-04 PROCEDURE — 250N000011 HC RX IP 250 OP 636: Performed by: ANESTHESIOLOGY

## 2025-02-04 PROCEDURE — G0463 HOSPITAL OUTPT CLINIC VISIT: HCPCS

## 2025-02-04 PROCEDURE — 0KQM0ZZ REPAIR PERINEUM MUSCLE, OPEN APPROACH: ICD-10-PCS | Performed by: OBSTETRICS & GYNECOLOGY

## 2025-02-04 PROCEDURE — 85041 AUTOMATED RBC COUNT: CPT | Performed by: OBSTETRICS & GYNECOLOGY

## 2025-02-04 PROCEDURE — 370N000003 HC ANESTHESIA WARD SERVICE: Performed by: ANESTHESIOLOGY

## 2025-02-04 PROCEDURE — 722N000001 HC LABOR CARE VAGINAL DELIVERY SINGLE

## 2025-02-04 PROCEDURE — 250N000009 HC RX 250: Performed by: OBSTETRICS & GYNECOLOGY

## 2025-02-04 PROCEDURE — 258N000003 HC RX IP 258 OP 636: Performed by: OBSTETRICS & GYNECOLOGY

## 2025-02-04 PROCEDURE — 3E0R3BZ INTRODUCTION OF ANESTHETIC AGENT INTO SPINAL CANAL, PERCUTANEOUS APPROACH: ICD-10-PCS | Performed by: ANESTHESIOLOGY

## 2025-02-04 PROCEDURE — 250N000011 HC RX IP 250 OP 636: Performed by: OBSTETRICS & GYNECOLOGY

## 2025-02-04 PROCEDURE — 10907ZC DRAINAGE OF AMNIOTIC FLUID, THERAPEUTIC FROM PRODUCTS OF CONCEPTION, VIA NATURAL OR ARTIFICIAL OPENING: ICD-10-PCS | Performed by: OBSTETRICS & GYNECOLOGY

## 2025-02-04 PROCEDURE — 120N000012 HC R&B POSTPARTUM

## 2025-02-04 PROCEDURE — 36415 COLL VENOUS BLD VENIPUNCTURE: CPT | Performed by: OBSTETRICS & GYNECOLOGY

## 2025-02-04 PROCEDURE — 00HU33Z INSERTION OF INFUSION DEVICE INTO SPINAL CANAL, PERCUTANEOUS APPROACH: ICD-10-PCS | Performed by: ANESTHESIOLOGY

## 2025-02-04 PROCEDURE — 86900 BLOOD TYPING SEROLOGIC ABO: CPT | Performed by: OBSTETRICS & GYNECOLOGY

## 2025-02-04 PROCEDURE — 250N000013 HC RX MED GY IP 250 OP 250 PS 637: Performed by: OBSTETRICS & GYNECOLOGY

## 2025-02-04 RX ORDER — CARBOPROST TROMETHAMINE 250 UG/ML
250 INJECTION, SOLUTION INTRAMUSCULAR
Status: DISCONTINUED | OUTPATIENT
Start: 2025-02-04 | End: 2025-02-06 | Stop reason: HOSPADM

## 2025-02-04 RX ORDER — ACETAMINOPHEN 325 MG/1
650 TABLET ORAL EVERY 4 HOURS PRN
Status: DISCONTINUED | OUTPATIENT
Start: 2025-02-04 | End: 2025-02-06 | Stop reason: HOSPADM

## 2025-02-04 RX ORDER — OXYTOCIN 10 [USP'U]/ML
10 INJECTION, SOLUTION INTRAMUSCULAR; INTRAVENOUS
Status: DISCONTINUED | OUTPATIENT
Start: 2025-02-04 | End: 2025-02-04 | Stop reason: HOSPADM

## 2025-02-04 RX ORDER — ONDANSETRON 2 MG/ML
4 INJECTION INTRAMUSCULAR; INTRAVENOUS EVERY 6 HOURS PRN
Status: DISCONTINUED | OUTPATIENT
Start: 2025-02-04 | End: 2025-02-04 | Stop reason: HOSPADM

## 2025-02-04 RX ORDER — OXYTOCIN/0.9 % SODIUM CHLORIDE 30/500 ML
1-24 PLASTIC BAG, INJECTION (ML) INTRAVENOUS CONTINUOUS
Status: DISCONTINUED | OUTPATIENT
Start: 2025-02-04 | End: 2025-02-04 | Stop reason: HOSPADM

## 2025-02-04 RX ORDER — KETOROLAC TROMETHAMINE 30 MG/ML
30 INJECTION, SOLUTION INTRAMUSCULAR; INTRAVENOUS
Status: DISCONTINUED | OUTPATIENT
Start: 2025-02-04 | End: 2025-02-04

## 2025-02-04 RX ORDER — OXYTOCIN/0.9 % SODIUM CHLORIDE 30/500 ML
340 PLASTIC BAG, INJECTION (ML) INTRAVENOUS CONTINUOUS PRN
Status: DISCONTINUED | OUTPATIENT
Start: 2025-02-04 | End: 2025-02-04 | Stop reason: HOSPADM

## 2025-02-04 RX ORDER — OXYTOCIN 10 [USP'U]/ML
10 INJECTION, SOLUTION INTRAMUSCULAR; INTRAVENOUS
Status: DISCONTINUED | OUTPATIENT
Start: 2025-02-04 | End: 2025-02-06 | Stop reason: HOSPADM

## 2025-02-04 RX ORDER — LIDOCAINE 40 MG/G
CREAM TOPICAL
Status: DISCONTINUED | OUTPATIENT
Start: 2025-02-04 | End: 2025-02-04 | Stop reason: HOSPADM

## 2025-02-04 RX ORDER — OXYTOCIN 10 [USP'U]/ML
10 INJECTION, SOLUTION INTRAMUSCULAR; INTRAVENOUS
Status: DISCONTINUED | OUTPATIENT
Start: 2025-02-04 | End: 2025-02-04

## 2025-02-04 RX ORDER — OXYTOCIN/0.9 % SODIUM CHLORIDE 30/500 ML
340 PLASTIC BAG, INJECTION (ML) INTRAVENOUS CONTINUOUS PRN
Status: DISCONTINUED | OUTPATIENT
Start: 2025-02-04 | End: 2025-02-06 | Stop reason: HOSPADM

## 2025-02-04 RX ORDER — CARBOPROST TROMETHAMINE 250 UG/ML
250 INJECTION, SOLUTION INTRAMUSCULAR
Status: DISCONTINUED | OUTPATIENT
Start: 2025-02-04 | End: 2025-02-04 | Stop reason: HOSPADM

## 2025-02-04 RX ORDER — MISOPROSTOL 200 UG/1
800 TABLET ORAL
Status: DISCONTINUED | OUTPATIENT
Start: 2025-02-04 | End: 2025-02-06 | Stop reason: HOSPADM

## 2025-02-04 RX ORDER — ROPIVACAINE HYDROCHLORIDE 2 MG/ML
INJECTION, SOLUTION EPIDURAL; INFILTRATION; PERINEURAL
Status: COMPLETED | OUTPATIENT
Start: 2025-02-04 | End: 2025-02-04

## 2025-02-04 RX ORDER — DOCUSATE SODIUM 100 MG/1
100 CAPSULE, LIQUID FILLED ORAL DAILY
Status: DISCONTINUED | OUTPATIENT
Start: 2025-02-04 | End: 2025-02-06 | Stop reason: HOSPADM

## 2025-02-04 RX ORDER — METHYLERGONOVINE MALEATE 0.2 MG/ML
200 INJECTION INTRAVENOUS
Status: DISCONTINUED | OUTPATIENT
Start: 2025-02-04 | End: 2025-02-06 | Stop reason: HOSPADM

## 2025-02-04 RX ORDER — LOPERAMIDE HYDROCHLORIDE 2 MG/1
2 CAPSULE ORAL
Status: DISCONTINUED | OUTPATIENT
Start: 2025-02-04 | End: 2025-02-04 | Stop reason: HOSPADM

## 2025-02-04 RX ORDER — TRANEXAMIC ACID 10 MG/ML
1 INJECTION, SOLUTION INTRAVENOUS EVERY 30 MIN PRN
Status: DISCONTINUED | OUTPATIENT
Start: 2025-02-04 | End: 2025-02-04 | Stop reason: HOSPADM

## 2025-02-04 RX ORDER — METOCLOPRAMIDE 10 MG/1
10 TABLET ORAL EVERY 6 HOURS PRN
Status: DISCONTINUED | OUTPATIENT
Start: 2025-02-04 | End: 2025-02-04 | Stop reason: HOSPADM

## 2025-02-04 RX ORDER — METHYLERGONOVINE MALEATE 0.2 MG/ML
200 INJECTION INTRAVENOUS
Status: DISCONTINUED | OUTPATIENT
Start: 2025-02-04 | End: 2025-02-04 | Stop reason: HOSPADM

## 2025-02-04 RX ORDER — LOPERAMIDE HYDROCHLORIDE 2 MG/1
4 CAPSULE ORAL
Status: DISCONTINUED | OUTPATIENT
Start: 2025-02-04 | End: 2025-02-06 | Stop reason: HOSPADM

## 2025-02-04 RX ORDER — CITRIC ACID/SODIUM CITRATE 334-500MG
30 SOLUTION, ORAL ORAL
Status: DISCONTINUED | OUTPATIENT
Start: 2025-02-04 | End: 2025-02-04 | Stop reason: HOSPADM

## 2025-02-04 RX ORDER — BISACODYL 10 MG
10 SUPPOSITORY, RECTAL RECTAL DAILY PRN
Status: DISCONTINUED | OUTPATIENT
Start: 2025-02-04 | End: 2025-02-06 | Stop reason: HOSPADM

## 2025-02-04 RX ORDER — LOPERAMIDE HYDROCHLORIDE 2 MG/1
4 CAPSULE ORAL
Status: DISCONTINUED | OUTPATIENT
Start: 2025-02-04 | End: 2025-02-04 | Stop reason: HOSPADM

## 2025-02-04 RX ORDER — NALOXONE HYDROCHLORIDE 0.4 MG/ML
0.2 INJECTION, SOLUTION INTRAMUSCULAR; INTRAVENOUS; SUBCUTANEOUS
Status: DISCONTINUED | OUTPATIENT
Start: 2025-02-04 | End: 2025-02-04 | Stop reason: HOSPADM

## 2025-02-04 RX ORDER — PROCHLORPERAZINE MALEATE 5 MG/1
10 TABLET ORAL EVERY 6 HOURS PRN
Status: DISCONTINUED | OUTPATIENT
Start: 2025-02-04 | End: 2025-02-04 | Stop reason: HOSPADM

## 2025-02-04 RX ORDER — MODIFIED LANOLIN
OINTMENT (GRAM) TOPICAL
Status: DISCONTINUED | OUTPATIENT
Start: 2025-02-04 | End: 2025-02-06 | Stop reason: HOSPADM

## 2025-02-04 RX ORDER — NALOXONE HYDROCHLORIDE 0.4 MG/ML
0.4 INJECTION, SOLUTION INTRAMUSCULAR; INTRAVENOUS; SUBCUTANEOUS
Status: DISCONTINUED | OUTPATIENT
Start: 2025-02-04 | End: 2025-02-04 | Stop reason: HOSPADM

## 2025-02-04 RX ORDER — OXYTOCIN/0.9 % SODIUM CHLORIDE 30/500 ML
100-340 PLASTIC BAG, INJECTION (ML) INTRAVENOUS CONTINUOUS PRN
Status: DISCONTINUED | OUTPATIENT
Start: 2025-02-04 | End: 2025-02-04

## 2025-02-04 RX ORDER — SODIUM CHLORIDE, SODIUM LACTATE, POTASSIUM CHLORIDE, CALCIUM CHLORIDE 600; 310; 30; 20 MG/100ML; MG/100ML; MG/100ML; MG/100ML
INJECTION, SOLUTION INTRAVENOUS CONTINUOUS PRN
Status: DISCONTINUED | OUTPATIENT
Start: 2025-02-04 | End: 2025-02-04 | Stop reason: HOSPADM

## 2025-02-04 RX ORDER — SODIUM CHLORIDE, SODIUM LACTATE, POTASSIUM CHLORIDE, CALCIUM CHLORIDE 600; 310; 30; 20 MG/100ML; MG/100ML; MG/100ML; MG/100ML
INJECTION, SOLUTION INTRAVENOUS CONTINUOUS
Status: DISCONTINUED | OUTPATIENT
Start: 2025-02-04 | End: 2025-02-04 | Stop reason: HOSPADM

## 2025-02-04 RX ORDER — MISOPROSTOL 200 UG/1
400 TABLET ORAL
Status: DISCONTINUED | OUTPATIENT
Start: 2025-02-04 | End: 2025-02-06 | Stop reason: HOSPADM

## 2025-02-04 RX ORDER — ONDANSETRON 4 MG/1
4 TABLET, ORALLY DISINTEGRATING ORAL EVERY 6 HOURS PRN
Status: DISCONTINUED | OUTPATIENT
Start: 2025-02-04 | End: 2025-02-04 | Stop reason: HOSPADM

## 2025-02-04 RX ORDER — IBUPROFEN 400 MG/1
800 TABLET, FILM COATED ORAL EVERY 6 HOURS PRN
Status: DISCONTINUED | OUTPATIENT
Start: 2025-02-04 | End: 2025-02-06 | Stop reason: HOSPADM

## 2025-02-04 RX ORDER — MISOPROSTOL 200 UG/1
400 TABLET ORAL
Status: DISCONTINUED | OUTPATIENT
Start: 2025-02-04 | End: 2025-02-04 | Stop reason: HOSPADM

## 2025-02-04 RX ORDER — METOCLOPRAMIDE HYDROCHLORIDE 5 MG/ML
10 INJECTION INTRAMUSCULAR; INTRAVENOUS EVERY 6 HOURS PRN
Status: DISCONTINUED | OUTPATIENT
Start: 2025-02-04 | End: 2025-02-04 | Stop reason: HOSPADM

## 2025-02-04 RX ORDER — IBUPROFEN 400 MG/1
800 TABLET, FILM COATED ORAL
Status: DISCONTINUED | OUTPATIENT
Start: 2025-02-04 | End: 2025-02-04

## 2025-02-04 RX ORDER — NALBUPHINE HYDROCHLORIDE 10 MG/ML
2.5-5 INJECTION INTRAMUSCULAR; INTRAVENOUS; SUBCUTANEOUS EVERY 6 HOURS PRN
Status: DISCONTINUED | OUTPATIENT
Start: 2025-02-04 | End: 2025-02-06 | Stop reason: HOSPADM

## 2025-02-04 RX ORDER — MISOPROSTOL 200 UG/1
800 TABLET ORAL
Status: DISCONTINUED | OUTPATIENT
Start: 2025-02-04 | End: 2025-02-04 | Stop reason: HOSPADM

## 2025-02-04 RX ORDER — ROPIVACAINE HYDROCHLORIDE 2 MG/ML
10 INJECTION, SOLUTION EPIDURAL; INFILTRATION; PERINEURAL ONCE
Status: DISCONTINUED | OUTPATIENT
Start: 2025-02-04 | End: 2025-02-04 | Stop reason: HOSPADM

## 2025-02-04 RX ORDER — LOPERAMIDE HYDROCHLORIDE 2 MG/1
2 CAPSULE ORAL
Status: DISCONTINUED | OUTPATIENT
Start: 2025-02-04 | End: 2025-02-06 | Stop reason: HOSPADM

## 2025-02-04 RX ORDER — TRANEXAMIC ACID 10 MG/ML
1 INJECTION, SOLUTION INTRAVENOUS EVERY 30 MIN PRN
Status: DISCONTINUED | OUTPATIENT
Start: 2025-02-04 | End: 2025-02-06 | Stop reason: HOSPADM

## 2025-02-04 RX ORDER — HYDROCORTISONE 25 MG/G
CREAM TOPICAL 3 TIMES DAILY PRN
Status: DISCONTINUED | OUTPATIENT
Start: 2025-02-04 | End: 2025-02-06 | Stop reason: HOSPADM

## 2025-02-04 RX ORDER — FENTANYL CITRATE 50 UG/ML
100 INJECTION, SOLUTION INTRAMUSCULAR; INTRAVENOUS
Status: DISCONTINUED | OUTPATIENT
Start: 2025-02-04 | End: 2025-02-04 | Stop reason: HOSPADM

## 2025-02-04 RX ORDER — EPHEDRINE SULFATE 50 MG/ML
5 INJECTION, SOLUTION INTRAMUSCULAR; INTRAVENOUS; SUBCUTANEOUS
Status: DISCONTINUED | OUTPATIENT
Start: 2025-02-04 | End: 2025-02-04 | Stop reason: HOSPADM

## 2025-02-04 RX ADMIN — Medication 2 MILLI-UNITS/MIN: at 09:09

## 2025-02-04 RX ADMIN — SODIUM CHLORIDE, POTASSIUM CHLORIDE, SODIUM LACTATE AND CALCIUM CHLORIDE: 600; 310; 30; 20 INJECTION, SOLUTION INTRAVENOUS at 15:49

## 2025-02-04 RX ADMIN — Medication: at 11:00

## 2025-02-04 RX ADMIN — FLUOXETINE HYDROCHLORIDE 20 MG: 20 CAPSULE ORAL at 08:57

## 2025-02-04 RX ADMIN — ROPIVACAINE HYDROCHLORIDE 10 ML: 2 INJECTION, SOLUTION EPIDURAL; INFILTRATION at 11:04

## 2025-02-04 RX ADMIN — METOCLOPRAMIDE 10 MG: 5 INJECTION, SOLUTION INTRAMUSCULAR; INTRAVENOUS at 12:36

## 2025-02-04 RX ADMIN — FLUOXETINE HYDROCHLORIDE 40 MG: 20 CAPSULE ORAL at 08:58

## 2025-02-04 RX ADMIN — SODIUM CHLORIDE, POTASSIUM CHLORIDE, SODIUM LACTATE AND CALCIUM CHLORIDE 1000 ML: 600; 310; 30; 20 INJECTION, SOLUTION INTRAVENOUS at 11:00

## 2025-02-04 RX ADMIN — SODIUM CHLORIDE, POTASSIUM CHLORIDE, SODIUM LACTATE AND CALCIUM CHLORIDE: 600; 310; 30; 20 INJECTION, SOLUTION INTRAVENOUS at 09:03

## 2025-02-04 ASSESSMENT — ACTIVITIES OF DAILY LIVING (ADL)
ADLS_ACUITY_SCORE: 18

## 2025-02-04 NOTE — PLAN OF CARE
Goal Outcome Evaluation:  Baby boy born spontaneously.  Laid on moms abdomen and delayed cord clamping done.  See delivery summary

## 2025-02-04 NOTE — ANESTHESIA PREPROCEDURE EVALUATION
Anesthesia Pre-Procedure Evaluation    Patient: Ary Lutz   MRN: [id number] : 1995        Procedure :           Past Medical History:   Diagnosis Date    Abnormal Pap smear of cervix 2020 NIL pap per pt report 18 NIL pap 2020 Abnormal pap per pt report. Unknown result. 10/30/20 NIL pap. Plan: await provider    Anxiety       Past Surgical History:   Procedure Laterality Date    COLONOSCOPY      ESOPHAGOSCOPY, GASTROSCOPY, DUODENOSCOPY (EGD), COMBINED N/A 2017    Procedure: COMBINED ESOPHAGOSCOPY, GASTROSCOPY, DUODENOSCOPY (EGD), BIOPSY SINGLE OR MULTIPLE;  gastroscopy;  Surgeon: Hollis Prather MD;  Location:  GI      Allergies   Allergen Reactions    No Clinical Screening - See Comments      PN: JUDI CM1: CONTRAST- nka Reaction :    Sulfamethoxazole-Trimethoprim Rash and Hives     Other reaction(s): Unknown/Not Verified      Social History     Tobacco Use    Smoking status: Never     Passive exposure: Never    Smokeless tobacco: Never   Substance Use Topics    Alcohol use: Not Currently      Wt Readings from Last 1 Encounters:   25 86.2 kg (190 lb)           Physical Exam    Airway        Mallampati: II       Respiratory Devices and Support         Dental           Cardiovascular   cardiovascular exam normal          Pulmonary   pulmonary exam normal                OUTSIDE LABS:  CBC:   Lab Results   Component Value Date    WBC 11.8 (H) 2025    WBC 12.4 (H) 2024    HGB 12.0 2025    HGB 12.4 2024    HCT 36.9 2025    HCT 38.9 2024     2025     2024     BMP:   Lab Results   Component Value Date     07/15/2024     2023    POTASSIUM 4.2 07/15/2024    POTASSIUM 4.4 2023    CHLORIDE 102 07/15/2024    CHLORIDE 102 2023    CO2 23 07/15/2024    CO2 25 2023    BUN 13.1 07/15/2024    BUN 14.5 2023    CR 0.59 07/15/2024    CR 0.73 2023    GLC 92 07/15/2024    GLC  "85 09/22/2023     COAGS: No results found for: \"PTT\", \"INR\", \"FIBR\"  POC:   Lab Results   Component Value Date    HCGS Negative 03/06/2018     HEPATIC:   Lab Results   Component Value Date    ALBUMIN 4.3 07/15/2024    PROTTOTAL 7.2 07/15/2024    ALT 22 07/15/2024    AST 22 07/15/2024    ALKPHOS 64 07/15/2024    BILITOTAL 0.7 07/15/2024     OTHER:   Lab Results   Component Value Date    A1C 4.9 08/05/2024    SATHYA 9.4 07/15/2024    LIPASE 165 03/06/2018    TSH 1.59 07/15/2024    T4 1.07 06/17/2024       Anesthesia Plan    ASA Status:  2       Anesthesia Type: Epidural.              Consents    Anesthesia Plan(s) and associated risks, benefits, and realistic alternatives discussed. Questions answered and patient/representative(s) expressed understanding.     - Discussed:     - Discussed with:  Patient            Postoperative Care            Comments:               VESTA SAGE MD    I have reviewed the pertinent notes and labs in the chart from the past 30 days and (re)examined the patient.  Any updates or changes from those notes are reflected in this note.    Clinically Significant Risk Factors Present on Admission                                          "

## 2025-02-04 NOTE — L&D DELIVERY NOTE
Admission date: 2025  Delivery date:  25  Place of delivery: [facility]    ADMITTING DIAGNOSIS:term IUP, pregnancy complicated by maternal anxiety    PROCEDURES:      HISTORY: The patient is a 29 year old  at 37w4d  wks gestation admitted to labor and delivery in active labor. Her pregnancy was complicated by maternal anxiety-she started meds during the pregnancy and had multiple extra visits and consults.  Her  labs showed blood type O negative, antibody screen was negative, rubella not immune,  Hepatitis B negative, syphilis negative, and her group B strep screen was negative.      The patient was admitted. AROM was done at 0838 and she was 4 cm for mec stained fluid.  She received epidural for pain control.  She progressed to complete and began pushing at about 13:40 hrs        She delivered a viable male infant at 1637 hours, with Apgars of 8 and 9 at 1 and 5 minutes respectively. The infant weight is pending.  Delayed cord clamping was performed.      The placenta was delivered intact with a 3-vessel cord at 1641 hours. Examination of the cervix and vagina revealed no evidence of lacerations. A second-degree perineal laceration was repaired with 3-0 Vicryl in a routine fashion. Estimated blood loss was 150 cc. Mom and baby are stable postpartum.

## 2025-02-04 NOTE — PROVIDER NOTIFICATION
02/04/25 0536   Provider Notification   Provider Name/Title Dr. Martinez   Method of Notification Phone   Request Evaluate - Remote   Notification Reason Patient Arrived;SVE     Physician returned page. Updated her on but not limited to sve and fetal heart tracing. Orders received to admit to L&D, standard labs, patient may have whatever she wants for pain. Expectant management. TORB.

## 2025-02-04 NOTE — PLAN OF CARE
Goal Outcome Evaluation:       Received pt into care at 0552, pt primp in labour (3.5/80/-2), lilian since 2300. Denies needing anything for pain at this time, wants epidural eventually.    Patient GBS negative, intact.    Plan of care expectant management of labour.    Patient expressed anxiety re delivering baby (anxiety about 37 week delivery and health concerns early in pregnancy).  Writer provided reassurance.        Cat I tracing. Handover to Ewelina NARVAEZ.     Problem: Labor  Goal: Hemostasis  Outcome: Progressing  Goal: Stable Fetal Wellbeing  Outcome: Progressing  Goal: Effective Progression to Delivery  Outcome: Progressing  Goal: Absence of Infection Signs and Symptoms  Outcome: Progressing  Goal: Acceptable Pain Control  Outcome: Progressing  Goal: Normal Uterine Contraction Pattern  Outcome: Progressing    Problem: Adult Inpatient Plan of Care  Goal: Plan of Care Review    Outcome: Progressing  Goal: Patient-Specific Goal (Individualized)    Outcome: Progressing  Goal: Absence of Hospital-Acquired Illness or Injury  Outcome: Progressing  Goal: Optimal Comfort and Wellbeing  Outcome: Progressing  Goal: Readiness for Transition of Care  Outcome: Progressing

## 2025-02-04 NOTE — PROVIDER NOTIFICATION
02/04/25 1130   Vital Signs   Temp 98.1  F (36.7  C)   Temp src Temporal   Resp 18   Oximeter Heart Rate 101 bpm   /56   BP - Mean 77   Oxygen Therapy   SpO2 99 %   O2 Device None (Room air)   Pain/Comfort/Sleep   Patient Currently in Pain no   Preferred Pain Scale DVPRS (Group)   DVPRS Pain Rating Score   (DVPRS) Pain Rating Score  0-No pain   Uterine Activity Assessment   Method external tocotransducer   Contraction Frequency (Minutes) 3-3.5   Contraction Duration (seconds) 60-70   Contraction Intensity moderate by palpation   Uterine Resting Tone soft by palpation   Fetal Assessment   Fetal Movement active   Fetal HR Assessment Method external US   Fetal HR (beats/min) 125   Fetal HR Baseline normal range   Fetal HR Variability moderate (amplitude range 6 to 25 bpm)   Fetal HR Accelerations increase 15 bpm above baseline lasting 15 seconds   Fetal HR Decelerations variable;early;prolonged   RN Strip Review Continuous   Intrauterine Corrective Measures Oxytocin discontinued;Reposition;Provider notified   Membranes   Membrane Status Leaking   Labor Care   Activity In Labor bedrest   Change of Maternal Position right side;left side   Comfort Interventions quiet environment promoted   Support At Bedside spouse   Trust Relationship/Rapport care explained;questions answered;questions encouraged;reassurance provided   Labor Interventions   Labor Interventions difficulty tracing, fetal monitor adjusted   Regional Anesthesia Motor Assessment   Motor Left Lower Extremity Able to bend knee;Able to bend ankle   Motor Right Lower Extremity Able to bend knee;Able to bend ankle   Local Anesthetic Toxicity   Local Anesthetic Systemic Toxicity Assessment WDL     Patient had a prolonged decel last 6.5 minutes.  SVE 5 cm, 90%, -2.  Noticed mec fluid.  Baby is active.  Prolonged decel decreased to 90's-110s.  Good variablilty.  Moved patient from right side to left side.  Stopped pitocin.  Called Dr. Nina to inform her.   See observed FHT from clinic.  Baby recoved back to baseline.  Will continue to watch her.

## 2025-02-04 NOTE — PROGRESS NOTES
Data: [facility] presented to [facility]: 2025  5:05 AM.  Reason for maternal/fetal assessment is uterine contractions. Patient reports contractions every 4-6 minutes apart that are stronger than before. Patient denies leaking of vaginal fluid/rupture of membranes, swelling, nausea. Patient reports fetal movement is normal. Patient is a 37w4d . Prenatal record reviewed. Pregnancy has been uncomplicated. Support person is present.     Fetal HR baseline was 145 , variability is moderate . Accelerations: present . Decelerations: not present . Uterine assessment is mild/moderate during contractions and soft at rest. Cervix 3.5 cm dilated and 80% effaced. Fetal station -2. Fetal presentation cephalic per cervical exam. Membranes: intact.    Vital signs wnl. Patient reports pain and is coping.     Action: Verbal consent for EFM. Triage assessment completed. Patient may meet criteria for early labor discharge.     Response: Patient verbalized understanding of triage assessment. Will contact Dr. Martinez with assessment and consideration of early labor discharge vs admission.

## 2025-02-04 NOTE — H&P
"[facility] Labor and Delivery History and Physical    Ary Lutz MRN# [id number]   Age: 29 year old YOB: 1995     Date of Admission:  2025    Primary care provider: Yanick Baca           Chief Complaint:   Ary Lutz is a 29 year old female who is 37w4d pregnant and being admitted for active labor management.          Pregnancy history:     OBSTETRIC HISTORY:    OB History    Para Term  AB Living   1 0 0 0 0 0   SAB IAB Ectopic Multiple Live Births   0 0 0 0 0      # Outcome Date GA Lbr Tre/2nd Weight Sex Type Anes PTL Lv   1 Current                EDC: Estimated Date of Delivery: 2025    Prenatal Labs:   Lab Results   Component Value Date    AS Negative 2025    HEPBANG Nonreactive 2024    CHPCRT Negative 2019    GCPCRT Negative 2019    TREPAB Negative 2017    HGB 12.0 2025       GBS Status:   No results found for: \"GBS\"    Active Problem List  Patient Active Problem List   Diagnosis    SCOTT (generalized anxiety disorder)    Panic attack    Cervical high risk HPV (human papillomavirus) test positive    Encounter for triage in pregnant patient    Chlamydia infection    Indication for care in labor or delivery       Medication Prior to Admission  Facility-Administered Medications Prior to Admission   Medication Dose Route Frequency Provider Last Rate Last Admin    Botulinum Toxin Type A (BOTOX) 200 units injection 150 Units  150 Units Intramuscular See Admin Instructions Ann Marion MD   150 Units at 22 0947    Botulinum Toxin Type A (BOTOX) 200 units injection 155 Units  155 Units Intramuscular See Admin Instructions Coretta Shea APRN CNP   155 Units at 22 1517    Botulinum Toxin Type A (BOTOX) 200 units injection 155 Units  155 Units Intramuscular Q90 Days Coretta Shea APRN CNP        Botulinum Toxin Type A (BOTOX) 200 units injection 200 " Units  200 Units Intramuscular See Admin Instructions Adonis Sheamila ROBE Ayon CNP   155 Units at 04/15/24 1127    Botulinum Toxin Type A (BOTOX) 200 units injection 200 Units  200 Units Intramuscular See Admin Instructions Adonis Sheamila ROBE Ayon CNP   165 Units at 07/27/23 0710     Medications Prior to Admission   Medication Sig Dispense Refill Last Dose/Taking    FLUoxetine (PROZAC) 20 MG capsule Take 20 mg along with 40 mg ( TDD 60 mg daily) 90 capsule 1 2/3/2025 Morning    FLUoxetine (PROZAC) 40 MG capsule Take 1 capsule (40 mg) by mouth daily. With a 20 mg capsule daily for total of 60 mg daily 90 capsule 0 2/3/2025 Morning    hydrOXYzine HCl (ATARAX) 25 MG tablet TAKE 1 TABLET (25 MG) BY MOUTH AS NEEDED FOR ITCHING (AS NEEDED FOR ITCHING.) 90 tablet 1 2/3/2025    pantoprazole (PROTONIX) 20 MG EC tablet Take 1 tablet by mouth daily at 2 pm.   2/3/2025    Prenatal Vit-Fe Fumarate-FA (PRENATAL MULTIVITAMIN  PLUS IRON) 27-1 MG TABS Take by mouth daily.   2/3/2025    Vitamin D3 (CHOLECALCIFEROL) 125 MCG (5000 UT) tablet Take 1 tablet by mouth daily.   2/3/2025   .        Maternal Past Medical History:     Past Medical History:   Diagnosis Date    Abnormal Pap smear of cervix 02/01/2020 9/2016 NIL pap per pt report 9/25/18 NIL pap 2/2020 Abnormal pap per pt report. Unknown result. 10/30/20 NIL pap. Plan: await provider    Anxiety                        Family History:   I have reviewed this patient's family history            Social History:     Social History     Tobacco Use    Smoking status: Never     Passive exposure: Never    Smokeless tobacco: Never   Substance Use Topics    Alcohol use: Not Currently            Review of Systems:   C: NEGATIVE for fever, chills, change in weight  E/M: NEGATIVE for ear, mouth and throat problems  R: NEGATIVE for significant cough or SOB  CV: NEGATIVE for chest pain, palpitations or peripheral edema          Physical Exam:   Vitals were  reviewed    Constitutional: Awake, alert, cooperative, no apparent distress, and appears stated age.  Eyes: Lids and lashes normal, pupils equal, round and reactive to light, extra ocular muscles intact, sclera clear, conjunctiva normal.  ENT: Normocephalic, without obvious abnormality, atramatic, sinuses nontender on palpation, external ears without lesions, oral pharynx with moist mucus membranes, tonsils without erythema or exudates, gums normal and good dentition.  Neck: Supple, symmetrical, trachea midline, no adenopathy, thyroid symmetric, not enlarged and no tenderness, skin normal.  Hematologic / Lymphatic: No cervical lymphadenopathy and no supraclavicular lymphadenopathy.  Back: Symmetric, no curvature, spinous processes are non-tender on palpation, paraspinous muscles are non-tender on palpation, no costal vertebral tenderness.  Lungs: No increased work of breathing, good air exchange, clear to auscultation bilaterally, no crackles or wheezing.  Cardiovascular: Regular rate and rhythm, normal S1 and S2, no S3 or S4, and no murmur noted.  Chest / Breast: Breasts symmetrical, skin without lesion(s), no nipple retraction or dimpling, no nipple discharge, no masses palpated, no axillary or supraclavicular adenopathy.  Abdomen: No scars, normal bowel sounds, soft, non-distended, non-tender, no masses palpated, no hepatosplenomegally.  Genitourinary: No urethral discharge, normal external genitalia, no hernia.  Musculoskeletal: No redness, warmth, or swelling of the joints.  Full range of motion noted.  Motor strength is 5 out of 5 all extremities bilaterally.  Tone is normal.  Neurologic: Awake, alert, oriented to name, place and time.  Cranial nerves II-XII are grossly intact.  Motor is 5 out of 5 bilaterally.  Cerebellar finger to nose, heel to shin intact.  Sensory is intact.  Babinski down going, Romberg negative, and gait is normal.  Neuropsychiatric: Normal affect, mood, orientation, memory and  insight.  Skin: No rashes, erythema, pallor, petechia or purpura.     Cervix:   Membranes: AROM   Dilation: 4   Effacement: 80%   Station:-2    Presentation:Cephalic  Fetal Heart Rate Tracing: reactive and reassuring  Tocometer: external monitor                       Assessment:   Ary Lutz is a 37w4d pregnant female admitted with active labor management.          Plan:   Admit - see IP orders    Lisa Nina MD

## 2025-02-05 LAB
ABO + RH BLD: NORMAL
FETAL CELL SCN BLD-IMP: NORMAL
HGB BLD-MCNC: 10.8 G/DL (ref 11.7–15.7)
SPECIMEN EXP DATE BLD: NORMAL

## 2025-02-05 PROCEDURE — 120N000012 HC R&B POSTPARTUM

## 2025-02-05 PROCEDURE — 85018 HEMOGLOBIN: CPT | Performed by: OBSTETRICS & GYNECOLOGY

## 2025-02-05 PROCEDURE — 85461 HEMOGLOBIN FETAL: CPT | Performed by: OBSTETRICS & GYNECOLOGY

## 2025-02-05 PROCEDURE — 86900 BLOOD TYPING SEROLOGIC ABO: CPT | Performed by: OBSTETRICS & GYNECOLOGY

## 2025-02-05 PROCEDURE — 250N000013 HC RX MED GY IP 250 OP 250 PS 637: Performed by: OBSTETRICS & GYNECOLOGY

## 2025-02-05 PROCEDURE — 36415 COLL VENOUS BLD VENIPUNCTURE: CPT | Performed by: OBSTETRICS & GYNECOLOGY

## 2025-02-05 PROCEDURE — 250N000011 HC RX IP 250 OP 636: Performed by: OBSTETRICS & GYNECOLOGY

## 2025-02-05 RX ORDER — FERROUS SULFATE 325(65) MG
325 TABLET ORAL DAILY
Status: DISCONTINUED | OUTPATIENT
Start: 2025-02-05 | End: 2025-02-06 | Stop reason: HOSPADM

## 2025-02-05 RX ADMIN — FLUOXETINE HYDROCHLORIDE 40 MG: 20 CAPSULE ORAL at 08:29

## 2025-02-05 RX ADMIN — ACETAMINOPHEN 650 MG: 325 TABLET, FILM COATED ORAL at 19:24

## 2025-02-05 RX ADMIN — ACETAMINOPHEN 650 MG: 325 TABLET, FILM COATED ORAL at 06:20

## 2025-02-05 RX ADMIN — IBUPROFEN 800 MG: 400 TABLET, FILM COATED ORAL at 06:20

## 2025-02-05 RX ADMIN — FERROUS SULFATE TAB 325 MG (65 MG ELEMENTAL FE) 325 MG: 325 (65 FE) TAB at 11:15

## 2025-02-05 RX ADMIN — HUMAN RHO(D) IMMUNE GLOBULIN 300 MCG: 1500 SOLUTION INTRAMUSCULAR; INTRAVENOUS at 08:28

## 2025-02-05 RX ADMIN — IBUPROFEN 800 MG: 400 TABLET, FILM COATED ORAL at 12:34

## 2025-02-05 RX ADMIN — FLUOXETINE HYDROCHLORIDE 20 MG: 20 CAPSULE ORAL at 08:30

## 2025-02-05 RX ADMIN — DOCUSATE SODIUM 100 MG: 100 CAPSULE, LIQUID FILLED ORAL at 08:29

## 2025-02-05 RX ADMIN — ACETAMINOPHEN 650 MG: 325 TABLET, FILM COATED ORAL at 12:34

## 2025-02-05 RX ADMIN — IBUPROFEN 800 MG: 400 TABLET, FILM COATED ORAL at 19:24

## 2025-02-05 RX ADMIN — ACETAMINOPHEN 650 MG: 325 TABLET, FILM COATED ORAL at 00:45

## 2025-02-05 RX ADMIN — IBUPROFEN 800 MG: 400 TABLET, FILM COATED ORAL at 00:46

## 2025-02-05 ASSESSMENT — ACTIVITIES OF DAILY LIVING (ADL)
ADLS_ACUITY_SCORE: 18

## 2025-02-05 NOTE — PROVIDER NOTIFICATION
02/04/25 1300   Vital Signs   Temp 97.3  F (36.3  C)   Temp src Temporal   Pain/Comfort/Sleep   Patient Currently in Pain no   Preferred Pain Scale DVPRS (Group)   DVPRS Pain Rating Score   (DVPRS) Pain Rating Score  0-No pain   Uterine Activity Assessment   Method Karen monitor   Contraction Pattern tripling   Contraction Frequency (Minutes) 1.5-3   Contraction Duration (seconds) 40-70   Contraction Intensity moderate by palpation   Uterine Resting Tone soft by palpation   Fetal Assessment   Fetal HR Assessment Method karen   Fetal HR (beats/min) 130   Fetal HR Baseline normal range   Fetal HR Variability moderate (amplitude range 6 to 25 bpm)   Fetal HR Accelerations increase 15 bpm above baseline lasting 15 seconds   Fetal HR Decelerations prolonged   RN Strip Review Continuous   Intrauterine Corrective Measures IV Fluid bolus;Oxytocin discontinued;Reposition;Provider notified   Labor Care   Activity In Labor bedrest   Change of Maternal Position right side;left side   Comfort Interventions quiet environment promoted   Support At Bedside spouse   Trust Relationship/Rapport care explained;questions answered;questions encouraged;reassurance provided   Labor Interventions   Labor Interventions fetal heart rate monitored   Regional Anesthesia Motor Assessment   Motor Left Lower Extremity Unable to bend knee;Unable to bend ankle   Motor Right Lower Extremity Unable to bend knee;Unable to bend ankle   Local Anesthetic Toxicity   Local Anesthetic Systemic Toxicity Assessment WDL     Patient had a few prolonged decels.  First one dropped to 100's lasting 3 mins.  Then recovered back to 120s.  Second decel dropped to 90's  for 4 mins recovered for 3 minutes droppped again to 110s for 2 minutes recovered again for 1 minute and dropped again to 110s for 3 minutes.   Called to come and assess.  She accessed strip at clinic and felt it recovered well and said to just watch FHT;s.  Did reposition several times.   Stopped pitocin and gave bolus of 250 mls.  FHT recovered to 120s at 1306

## 2025-02-05 NOTE — PROGRESS NOTES
Data: Ary Lutz transferred to [facility] via wheelchair at 1950. Baby transferred via parent's arms.  Action: Receiving unit notified of transfer: Yes. Patient and family notified of room change. Report given to Dana JOHNSON RN at 1955. Belongings sent to receiving unit. Accompanied by Registered Nurse. Oriented patient to surroundings. Call light within reach. ID bands double-checked with receiving RN.  Response: Patient tolerated transfer and is stable.

## 2025-02-05 NOTE — PROGRESS NOTES
[facility]       DAILY NOTE - POSTPARTUM DAY 1     SUBJECTIVE:     Pain controlled? Yes  Tolerating a regular diet? YES  Ambulating? YES  Voiding without difficulty? Yes    OBJECTIVE:  Vitals:    25 1915 259 25 0049 25   BP: 132/73 114/81 115/81 119/78   BP Location:  Right arm Right arm Right arm   Patient Position:       Cuff Size:       Pulse:  87 84 87   Resp:  16 16 16   Temp:  97.9  F (36.6  C) 98.2  F (36.8  C) 98.8  F (37.1  C)   TempSrc:  Oral Axillary Axillary   SpO2: 100%      Weight:       Height:           Constitutional: healthy, alert, and no distress    Abdomen:  Uterine fundus is firm, non-tender and at the level of the umbilicus     Ext: trace edema, no CT      LABS:  Hemoglobin   Date Value Ref Range Status   2025 10.8 (L) 11.7 - 15.7 g/dL Final   2025 12.0 11.7 - 15.7 g/dL Final   2020 13.8 11.7 - 15.7 g/dL Final   2018 12.9 11.7 - 15.7 g/dL Final       ASSESSMENT:  Post-partum day #1 s/p   Pregnancy complicated by NO COMPLICATIONS    Doing well.  No excessive bleeding  Pain well-controlled.       PLAN:   Ambulation encouraged  Pain control measures as needed  Start iron supplementation  Continue routine postpartum cares  Anticipate discharge tomorrow    Chuyita Martinez MD

## 2025-02-05 NOTE — LACTATION NOTE
Lactation visit with MERCY Kunz, and baby Raymond.    Infant was jittery at birth and had a blood sugar of 36, infant ended up receiving gel x3 for blood sugars continuing to dip. Now infant has completed receiving three blood glucoses above 50. Per RN, it has been a struggle to get infant to breastfeed but he has been bottle feeding DBM with Dr Nemesio che well (now taking 20 ml).    I assisted with the 1300 feeding. Observed infant's face to be asymmetric and he holds his head to the left. Infant also has a very tight jaw, Primary RN had offered a nipple shield to help infant open his mouth wide enough to breastfeed. Primary RN shares football hold has been the best. Help to position infant in football hold on R breast. Infant appears very uncomfortable with trying to latch. Arching, whimpering, and his breathing rate begins to increase. Did bring infant chest to chest with Joaquina to calm him down and then we did attempt cross cradle hold as well. Able to get infant to eventually latch and he had a couple of bursts of suckles but then stopped and with his fast respiratory rate, suggested we transition him to his bottle.    Will consult with OT about this baby and ask if appropriate to place OT consult.     Addendum:  Revisit after 1600 feeding to share with parents that OT will visit tomorrow. Joaquina shares infant suckled a little bit with this feeding and dad did the bottle. Joaquina pumping. Infant hadn't finished his bottle and was in his bassinet, he was a little spitty; dad turned infant on his side and he burped and then started rooting. Suggested after a feeding to hold infant vertical against their chest for 5-10 minutes after a feeding to allow milk to settle in Arseniocaity's tummy.  And since Raymond still showing hunger cues, suggested dad finish feeding him the bottle. Raymond did well with the remainder of his bottle!  Talked to parents about burping techniques. Joaquina pumped about 1 ml and I showed them how to dip a  finger in colostrum and allow infant to suckle the finger.    Appreciative of visit.    Dunia Rachel RN, IBCLC

## 2025-02-05 NOTE — PLAN OF CARE
VSS, fundus firm, light flow. Using IP and Tucks. Working on breastfeeding with a shield, pumping after and suppl. with DM by bottle and drops of colostrum. Hgb. 10.8. Rhogam given.   Goal Outcome Evaluation:      Plan of Care Reviewed With: patient    Overall Patient Progress: improvingOverall Patient Progress: improving

## 2025-02-05 NOTE — PLAN OF CARE
Vital signs stable. Postpartum assessment WDL. Pain controlled with Tylenol and Ibuprofen. PIV SL. Am Hgb and will need Rhogam- study released. Patient voiding without difficulty. Working on breastfeeding and supplementing infant with DBM via bottle. Started pumping. Patient and infant bonding well. Encouraged to call with questions/concerns. Will continue with current plan of care.

## 2025-02-06 VITALS
TEMPERATURE: 98.5 F | WEIGHT: 188.1 LBS | HEART RATE: 93 BPM | SYSTOLIC BLOOD PRESSURE: 123 MMHG | HEIGHT: 68 IN | OXYGEN SATURATION: 100 % | DIASTOLIC BLOOD PRESSURE: 84 MMHG | BODY MASS INDEX: 28.51 KG/M2 | RESPIRATION RATE: 18 BRPM

## 2025-02-06 PROCEDURE — 250N000013 HC RX MED GY IP 250 OP 250 PS 637: Performed by: OBSTETRICS & GYNECOLOGY

## 2025-02-06 PROCEDURE — 90707 MMR VACCINE SC: CPT | Performed by: OBSTETRICS & GYNECOLOGY

## 2025-02-06 PROCEDURE — 90471 IMMUNIZATION ADMIN: CPT | Performed by: OBSTETRICS & GYNECOLOGY

## 2025-02-06 PROCEDURE — 250N000011 HC RX IP 250 OP 636: Performed by: OBSTETRICS & GYNECOLOGY

## 2025-02-06 RX ORDER — NALOXONE HYDROCHLORIDE 0.4 MG/ML
0.4 INJECTION, SOLUTION INTRAMUSCULAR; INTRAVENOUS; SUBCUTANEOUS
Status: DISCONTINUED | OUTPATIENT
Start: 2025-02-06 | End: 2025-02-06 | Stop reason: HOSPADM

## 2025-02-06 RX ORDER — NALOXONE HYDROCHLORIDE 0.4 MG/ML
0.2 INJECTION, SOLUTION INTRAMUSCULAR; INTRAVENOUS; SUBCUTANEOUS
Status: DISCONTINUED | OUTPATIENT
Start: 2025-02-06 | End: 2025-02-06 | Stop reason: HOSPADM

## 2025-02-06 RX ORDER — ACETAMINOPHEN 325 MG/1
650 TABLET ORAL EVERY 4 HOURS PRN
COMMUNITY
Start: 2025-02-06

## 2025-02-06 RX ORDER — DOCUSATE SODIUM 100 MG/1
100 CAPSULE, LIQUID FILLED ORAL DAILY
COMMUNITY
Start: 2025-02-06

## 2025-02-06 RX ORDER — IBUPROFEN 800 MG/1
800 TABLET, FILM COATED ORAL EVERY 6 HOURS PRN
COMMUNITY
Start: 2025-02-06

## 2025-02-06 RX ADMIN — MEASLES, MUMPS, AND RUBELLA VIRUS VACCINE LIVE 0.5 ML: 1000; 12500; 1000 INJECTION, POWDER, LYOPHILIZED, FOR SUSPENSION SUBCUTANEOUS at 11:34

## 2025-02-06 RX ADMIN — IBUPROFEN 800 MG: 400 TABLET, FILM COATED ORAL at 02:03

## 2025-02-06 RX ADMIN — FLUOXETINE HYDROCHLORIDE 40 MG: 20 CAPSULE ORAL at 08:45

## 2025-02-06 RX ADMIN — ACETAMINOPHEN 650 MG: 325 TABLET, FILM COATED ORAL at 08:46

## 2025-02-06 RX ADMIN — IBUPROFEN 800 MG: 400 TABLET, FILM COATED ORAL at 08:45

## 2025-02-06 RX ADMIN — ACETAMINOPHEN 650 MG: 325 TABLET, FILM COATED ORAL at 02:03

## 2025-02-06 RX ADMIN — DOCUSATE SODIUM 100 MG: 100 CAPSULE, LIQUID FILLED ORAL at 08:44

## 2025-02-06 RX ADMIN — FLUOXETINE HYDROCHLORIDE 20 MG: 20 CAPSULE ORAL at 08:45

## 2025-02-06 RX ADMIN — FERROUS SULFATE TAB 325 MG (65 MG ELEMENTAL FE) 325 MG: 325 (65 FE) TAB at 08:45

## 2025-02-06 ASSESSMENT — ACTIVITIES OF DAILY LIVING (ADL)
ADLS_ACUITY_SCORE: 18

## 2025-02-06 NOTE — ANESTHESIA POSTPROCEDURE EVALUATION
Patient: Ary Lutz    Procedure: * No procedures listed *       Anesthesia Type:  Epidural    Note:  Disposition: Inpatient   Postop Pain Control: Uneventful            Sign Out: Well controlled pain   PONV: No   Neuro/Psych: Uneventful            Sign Out: Acceptable/Baseline neuro status   Airway/Respiratory: Uneventful            Sign Out: Acceptable/Baseline resp. status   CV/Hemodynamics: Uneventful            Sign Out: Acceptable CV status   Other NRE: NONE   DID A NON-ROUTINE EVENT OCCUR?            Last vitals:  Vitals:    02/05/25 0855 02/05/25 1300 02/05/25 1600   BP: 121/82 (P) 119/80 122/81   Pulse: 95 (P) 90 82   Resp: 18 (P) 18 16   Temp: 37.2  C (99  F) (P) 36.7  C (98  F) 36.8  C (98.2  F)   SpO2:          Electronically Signed By: Hira Feliz MD  February 5, 2025  10:25 PM

## 2025-02-06 NOTE — LACTATION NOTE
Routine visit with Joaquina, [facility] and baby Milcaity.    Breastfeeding general information reviewed.   Advised to breastfeed first on one side and then pump after each feeding.  Baby having a hard time turning to the right,  placed in football hold on the left breast and baby able to latch on well with the shield.  Band-aids placed to help keep the shield on.  Tube of HDM and with tube at the breast Baby took 25ml of the 20ml and FOB bottle fed the remaining 5ml.   Encouraged skin to skin, feeding on demand 8-12x/day or sooner if baby cues. Reviewed benefits of holding and skin to skin.  LC Measured nipples bilaterally 21mm  so the 24mm flange is the correct size.   brought in and had mother put lanolin on  prior to pumping and  made Joaquina a hands free  pumping bra.   set mother up with the breast pump. Joaquina very appreciative of all the tips/ information and helping make breastfeeding and pumping more comfortable.        Instructed on signs/symptoms of engorgement/ plugged ducts and mastitis.  Instructed on comfort measures and when to call MD.  Getting ready for discharge.  Plan: Watch for feeding cues and feed every 2-3 hours and/or on demand. Continue to use feeding log to track intake and appropriate voids and stools. Take feeding log to first follow up appointment or weight check. Encourage skin to skin to promote frequent feedings, thermoregulation and bonding. Follow-up with healthcare provider or lactation consultant for questions or concerns.  Has a breast pump for home. No further questions at this time.   Will follow as needed.   Lucille Ferrari BSN, RN, PHN, RNC-MNN, IBCLC

## 2025-02-06 NOTE — PLAN OF CARE
Vital signs stable. Postpartum assessment WDL. Pain controlled with Tylenol and Ibuprofen. Patient voiding without difficulty. Working on breastfeeding infant every 3 hours, pumping, and supplementing infant with DBM via bottle. Patient and infant bonding well. Encouraged to call with questions/concerns. Will continue with current plan of care.

## 2025-02-06 NOTE — PROGRESS NOTES
[facility]       DAILY NOTE - POSTPARTUM DAY 2     SUBJECTIVE:     Pain controlled? Yes  Tolerating a regular diet? YES  Ambulating? YES  Voiding without difficulty? Yes    OBJECTIVE:  Vitals:    25 1300 25 1600 25 2316 25 0828   BP: 119/80 122/81 121/83    BP Location:   Right arm    Pulse: 90 82 87    Resp: 18 16 16    Temp: 98  F (36.7  C) 98.2  F (36.8  C) 98.6  F (37  C)    TempSrc: Oral Axillary Axillary    SpO2:       Weight:    85.3 kg (188 lb 1.6 oz)   Height:           Constitutional: healthy, alert, and no distress    Abdomen:  Uterine fundus is firm, non-tender and at the level of the umbilicus -3 cm    Extr +1 edema      LABS:  Hemoglobin   Date Value Ref Range Status   2025 10.8 (L) 11.7 - 15.7 g/dL Final   2025 12.0 11.7 - 15.7 g/dL Final   2020 13.8 11.7 - 15.7 g/dL Final   2018 12.9 11.7 - 15.7 g/dL Final       ASSESSMENT:  Post-partum day #2 s/p   Pregnancy complicated by maternal anxiety    Doing well.       PLAN:   Discharge today.  Return to clinic in 2 and 6 weeks.  Continue routine postpartum cares    Lisa Nina MD

## 2025-02-06 NOTE — PLAN OF CARE
Goal Outcome Evaluation:      Plan of Care Reviewed With: patient, spouse    Overall Patient Progress: improvingOverall Patient Progress: improving     Fundus firm and bleeding wnl.  VSS.  Voiding without difficulty.  Taking tylenol and ibuprofen with good relief.  Up independently.  Using ice and tucks.  Encouraged to call with questions or concerns.

## 2025-02-06 NOTE — DISCHARGE INSTRUCTIONS
Warning Signs after Having a Baby    Keep this paper on your fridge or somewhere else where you can see it.    Call your provider if you have any of these symptoms up to 12 weeks after having your baby.    Thoughts of hurting yourself or your baby  Pain in your chest or trouble breathing  Severe headache not helped by pain medicine  Eyesight concerns (blurry vision, seeing spots or flashes of light, other changes to eyesight)  Fainting, shaking or other signs of a seizure    Call 9-1-1 if you feel that it is an emergency.     The symptoms below can happen to anyone after giving birth. They can be very serious. Call your provider if you have any of these warning signs.    My provider s phone number: _______________________    Losing too much blood (hemorrhage)    Call your provider if you soak through a pad in less than an hour or pass blood clots bigger than a golf ball. These may be signs that you are bleeding too much.    Blood clots in the legs or lungs    After you give birth, your body naturally clots its blood to help prevent blood loss. Sometimes this increased clotting can happen in other areas of the body, like the legs or lungs. This can block your blood flow and be very dangerous.     Call your provider if you:  Have a red, swollen spot on the back of your leg that is warm or painful when you touch it.   Are coughing up blood.     Infection    Call your provider if you have any of these symptoms:  Fever of 100.4 F (38 C) or higher.  Pain or redness around your stitches if you had an incision.   Any yellow, white, or green fluid coming from places where you had stitches or surgery.    Mood Problems (postpartum depression)    Many people feel sad or have mood changes after having a baby. But for some people, these mood swings are worse.     Call your provider right away if you feel so anxious or nervous that you can't care for yourself or your baby.    Preeclampsia (high blood pressure)    Even if you  "didn't have high blood pressure when you were pregnant, you are at risk for the high blood pressure disease called preeclampsia. This risk can last up to 12 weeks after giving birth.     Call your provider if you have:   Pain on your right side under your rib cage  Sudden swelling in the hands and face    Remember: You know your body. If something doesn't feel right, get medical help.     For informational purposes only. Not to replace the advice of your health care provider. Copyright 2020 [place] [employer] [facility]. All rights reserved. Clinically reviewed by Dianne Singh, RNC-OB, MSN. [employer] [id number] - Rev .    Postpartum Care at Home With Your Baby: Care Instructions  Overview     After childbirth (postpartum period), your body goes through many changes as you recover. In these weeks after delivery, try to take good care of yourself. Get rest whenever you can and accept help from others.  It may take 4 to 6 weeks to feel like yourself again, and possibly longer if you had a  birth. You may feel sore or very tired as you recover. After delivery, you may continue to have contractions as the uterus returns to the size it was before your pregnancy. You will also have some vaginal bleeding. And you may have pain around the vagina as you heal. Several days after delivery you may also have pain and swelling in your breasts as they fill with milk. There are things you can do at home to help ease these discomforts.  After childbirth, it's common to feel emotional. You may feel irritable, cry easily, and feel happy one minute and sad the next. This is called the \"baby blues.\" Hormone changes are one cause of these emotional changes. These feelings usually get better within a couple of weeks. If they don't, talk to your doctor or midwife.  In the first couple of weeks after you give birth, your doctor or midwife may want to check in with you and make a plan for follow-up care. You will likely have a " complete postpartum visit in the first 3 months after delivery. At that time, your doctor or midwife will check on your recovery and see how you're doing. But if you have questions or concerns before then, you can always call your doctor or midwife.  Follow-up care is a key part of your treatment and safety. Be sure to make and go to all appointments, and call your doctor if you are having problems. It's also a good idea to know your test results and keep a list of the medicines you take.  How can you care for yourself at home?  Taking care of your body  Use pads instead of tampons for bleeding. After birth, you will have bloody vaginal discharge. You may also pass some blood clots that shouldn't be bigger than an egg. Over the next 6 weeks or so, your bleeding should decrease a little every day and slowly change to a pinkish and then whitish discharge.  For cramps or mild pain, try an over-the-counter pain medicine, such as acetaminophen (Tylenol) or ibuprofen (Advil, Motrin). Read and follow all instructions on the label.  To ease pain around the vagina or from hemorrhoids:  Put ice or a cold pack on the area for 10 to 20 minutes at a time. Put a thin cloth between the ice and your skin.  Try sitting in a few inches of warm water (sitz bath) when you can or after bowel movements.  Clean yourself with a gentle squeeze of warm water from a bottle instead of wiping with toilet paper.  Use witch hazel or hemorrhoid pads (such as Tucks).  Try using a cold compress for sore and swollen breasts. And wear a supportive bra that fits.  Ease constipation by drinking plenty of fluids and eating high-fiber foods. Ask your doctor or midwife about over-the-counter stool softeners.  Activity  Rest when you can.  Ask for help from family or friends when you need it.  If you can, have another adult in your home for at least 2 or 3 days after birth.  When you feel ready, try to get some exercise every day. For many people, walking  is a good choice. Don't do any heavy exercise until your doctor or midwife says it's okay.  Ask your doctor or midwife when it is okay to have vaginal sex.  If you don't want to get pregnant, talk to your doctor or midwife about birth control options. You can get pregnant even before your period returns. Also, you can get pregnant while you are breastfeeding.  Talk to your doctor or midwife if you want to get pregnant again. They can talk to you about when it is safe.  Emotional health  It's normal to have some sadness, anxiety, and mood swings after delivery. It may help to talk with a trusted friend or family member. You can also call the Maternal Mental Health Hotline at 7-868-[facility] [place] (1-551.859.4169) for support. If these mood changes last more than a couple of weeks, talk to your doctor or midwife.  When should you call for help?  Share this information with your partner, family, or a friend. They can help you watch for warning signs.  Call 911  anytime you think you may need emergency care. For example, call if:    You feel you cannot stop from hurting yourself, your baby, or someone else.     You passed out (lost consciousness).     You have chest pain, are short of breath, or cough up blood.     You have a seizure.   Where to get help 24 hours a day, 7 days a week   If you or someone you know talks about suicide, self-harm, a mental health crisis, a substance use crisis, or any other kind of emotional distress, get help right away. You can:    Call the Suicide and Crisis Lifeline at 458.     Call 2-416-019-TALK (1-765.519.9403).     Text HOME to 383617 to access the Crisis Text Line.   Consider saving these numbers in your phone.  Go to [employer] for more information or to chat online.  Call your doctor or midwife now or seek immediate medical care if:    You have signs of hemorrhage (too much bleeding), such as:  Heavy vaginal bleeding. This means that you are soaking through one or more pads in an  "hour. Or you pass blood clots bigger than an egg.  Feeling dizzy or lightheaded, or you feel like you may faint.  Feeling so tired or weak that you cannot do your usual activities.  A fast or irregular heartbeat.  New or worse belly pain.     You have signs of infection, such as:  A fever.  Increased pain, swelling, warmth, or redness from an incision or wound.  Frequent or painful urination or blood in your urine.  Vaginal discharge that smells bad.  New or worse belly pain.     You have symptoms of a blood clot in your leg (called a deep vein thrombosis), such as:  Pain in the calf, back of the knee, thigh, or groin.  Swelling in the leg or groin.  A color change on the leg or groin. The skin may be reddish or purplish, depending on your usual skin color.     You have signs of preeclampsia, such as:  Sudden swelling of your face, hands, or feet.  New vision problems (such as dimness, blurring, or seeing spots).  A severe headache.     You have signs of heart failure, such as:  New or increased shortness of breath.  New or worse swelling in your legs, ankles, or feet.  Sudden weight gain, such as more than 2 to 3 pounds in a day or 5 pounds in a week.  Feeling so tired or weak that you cannot do your usual activities.     You had spinal or epidural pain relief and have:  New or worse back pain.  Increased pain, swelling, warmth, or redness at the injection site.  Tingling, weakness, or numbness in your legs or groin.   Watch closely for changes in your health, and be sure to contact your doctor or midwife if:    Your vaginal bleeding isn't decreasing.     You feel sad, anxious, or hopeless for more than a few days.     You are having problems with your breasts or breastfeeding.   Where can you learn more?  Go to https://www.healthwise.net/patiented  Enter Z768 in the search box to learn more about \"Postpartum Care at Home With Your Baby: Care Instructions.\"  Current as of: April 30, 2024  Content Version: 14.3    " 2024 [employer].   Care instructions adapted under license by your healthcare professional. If you have questions about a medical condition or this instruction, always ask your healthcare professional. [employer] disclaims any warranty or liability for your use of this information.

## 2025-02-06 NOTE — PLAN OF CARE
D: VSS, assessments WDL. Taking Tylenol and Ibuprofen for pain. Lactation visit today for assist with breastfeeding. Baby able to latch with shield and suppl. with DM at breast. OT consult done today and parents shown techniques to help relax baby's neck muscles. Planning to suppl. with Similac at home. MMR given today.   I: Pt. received complete discharge paperwork.  Pt. was given times of last dose for all discharge medications in writing on discharge medication sheets.  Discharge teaching included home medication, pain management, activity restrictions, postpartum cares, and signs and symptoms of infection.    A: Discharge outcomes on care plan met.  Mother states understanding and comfort with self care and follow up care.   P: Pt. Discharged.  Pt. was accompanied by  and left with personal belongings.Pt. to follow up with OB provider per discharge instructions.  Pt. had no further questions at the time of discharge and no unmet needs were identified.  Goal Outcome Evaluation:      Plan of Care Reviewed With: patient    Overall Patient Progress: improvingOverall Patient Progress: improving

## 2025-02-08 ENCOUNTER — PATIENT OUTREACH (OUTPATIENT)
Dept: CARE COORDINATION | Facility: CLINIC | Age: 30
End: 2025-02-08
Payer: COMMERCIAL

## 2025-02-08 NOTE — PROGRESS NOTES
Connected Care Resource Center Contact  [facility]/Voicemail     Clinical Data: Post-Discharge Outreach     Outreach attempted x 2.  Left message on patient's voicemail, providing [facility]'s central phone number of [phone] ([phone]) for questions/concerns and/or to schedule an appt with an [facility] provider, if they do not have a PCP.      Plan:  [facility] will do no further outreaches at this time.        Sierra BATEMAN, Community Health Worker  Clinic Care Coordination  [facility]  Phone: [phone]      *Connected Care Resource Team does NOT follow patient ongoing. Referrals are identified based on internal discharge reports and the outreach is to ensure patient has an understanding of their discharge instructions

## 2025-02-17 ENCOUNTER — LACTATION ENCOUNTER (OUTPATIENT)
Dept: OTHER | Facility: CLINIC | Age: 30
End: 2025-02-17

## 2025-02-17 NOTE — LACTATION NOTE
"This note was copied from a baby's chart.  Lactation visit with Joaquina, mother & baby Raymond in NICU to work on feeding. Joaquina shared latching has been going better when she's worked on it with Raymond, and she's getting lots milk with pumping, but she has a difficult time knowing when he's finished breastfeeding and is just \"comfort sucking\", and when to change to the other breast. She also feels he latches better on right side than on left side, even though he's doing much better latching now on both sides than he had been doing soon after birth. During visit, he was alert and showing feeding cues. We decided to work on Joaquina's more \"difficult\" side, the left, at her request.    Placed Raymond in cross cradle hold with pillow support, and encouraged Joaquina to support her breast in a deep U hold. Demonstrated how sandwiching her breast deeply underneath breast helped Raymond to be able to latch more deeply, and assisted from upper side. He was able to start a nutritive suck pattern with a few tries and then continue feeding, developing a strong suck and audible swallows noted. Reviewed breast compression technique, how to gently stimulate him during feeding to keep him actively sucking, and how to know when he's finished feeding on one side and when to change over to other side. Reviewed his nutritive vs non-nutritive suck pattern so she knows what to watch for. We changed over to right side and practiced re-latching on right and he continued to feed with a deep latch and strong feeding pattern. Joaquina stated she feels more comfortable watching for his feeding behaviors and knowing when to end one side and change over.     Discussed pumping and when it will continue to be necessary. Encouraged pumping after feedings if Raymond is needing supplemental milk after a feeding, or if she's  from him for a breastfeed recommend she pump at least every 3 hours. Discussed that with good breastfeeds, as he takes his full feeding at " breast we'd recommend she no longer pump as it will help her milk supply to regulate. Joaquina shared she had been very nervous at home, not knowing how much Raymond was getting at the breast. Offered support and encouragement and reviewed other ways we can know babies are breastfeeding well. Also discussed weighed in/weighed out feeds we can do while here in NICU to help have an idea of Raymond's general feeding progress. Joaquina appreciative of our visit and Lactation support. Lactation is available as needed to revisit.    Lindsey Gaspar, RN, BSN, MNN, IBCLC

## 2025-03-03 ENCOUNTER — LACTATION ENCOUNTER (OUTPATIENT)
Dept: OTHER | Facility: CLINIC | Age: 30
End: 2025-03-03

## 2025-03-03 NOTE — LACTATION NOTE
This note was copied from a baby's chart.  Routine visit Mother reports she is pumping every 3 hours around the clock.  Her Baby is only able to take thickened formula this week. Encouraged to have her nurse call Lactation when breastfeeding.    Mother requested information about medication Hydroxyzine and discussed with mother what to look for with infant and that Dr. Cast's medication and mother's milk  L2.  No further questions at this time. Will follow as needed. Lucille Ferrari BSN, RN, PHN, RNC-MNN, IBCLC

## 2025-03-21 ENCOUNTER — LAB REQUISITION (OUTPATIENT)
Dept: LAB | Facility: CLINIC | Age: 30
End: 2025-03-21
Payer: COMMERCIAL

## 2025-03-21 DIAGNOSIS — Z12.4 ENCOUNTER FOR SCREENING FOR MALIGNANT NEOPLASM OF CERVIX: ICD-10-CM

## 2025-03-21 PROCEDURE — 87624 HPV HI-RISK TYP POOLED RSLT: CPT | Mod: ORL | Performed by: OBSTETRICS & GYNECOLOGY

## 2025-03-21 PROCEDURE — G0145 SCR C/V CYTO,THINLAYER,RESCR: HCPCS | Mod: ORL | Performed by: OBSTETRICS & GYNECOLOGY

## 2025-03-24 LAB
HPV HR 12 DNA CVX QL NAA+PROBE: NEGATIVE
HPV16 DNA CVX QL NAA+PROBE: NEGATIVE
HPV18 DNA CVX QL NAA+PROBE: NEGATIVE
HUMAN PAPILLOMA VIRUS FINAL DIAGNOSIS: NORMAL

## 2025-03-27 LAB
BKR AP ASSOCIATED HPV REPORT: NORMAL
BKR LAB AP GYN ADEQUACY: NORMAL
BKR LAB AP GYN INTERPRETATION: NORMAL
BKR LAB AP LMP: NORMAL
BKR LAB AP PREVIOUS ABNL DX: NORMAL
BKR LAB AP PREVIOUS ABNORMAL: NORMAL
PATH REPORT.COMMENTS IMP SPEC: NORMAL
PATH REPORT.COMMENTS IMP SPEC: NORMAL
PATH REPORT.RELEVANT HX SPEC: NORMAL

## 2025-03-28 PROBLEM — G43.709 CHRONIC MIGRAINE WITHOUT AURA WITHOUT STATUS MIGRAINOSUS, NOT INTRACTABLE: Status: ACTIVE | Noted: 2025-03-28

## 2025-04-09 ENCOUNTER — TELEPHONE (OUTPATIENT)
Dept: NEUROLOGY | Facility: CLINIC | Age: 30
End: 2025-04-09
Payer: COMMERCIAL

## 2025-04-09 NOTE — TELEPHONE ENCOUNTER
Patient confirmed scheduled appointment:  Date: 6/25/25  Time: 11:30am  Visit type: Return Headache  Provider: Coretta Shea  Location: Virtual  Testing/imaging: NA  Additional notes: 3 month follow up    Dana Arita on 4/9/2025 at 11:06 AM

## 2025-04-21 PROBLEM — R87.810 CERVICAL HIGH RISK HPV (HUMAN PAPILLOMAVIRUS) TEST POSITIVE: Status: ACTIVE | Noted: 2020-02-01

## 2025-04-23 ENCOUNTER — TELEPHONE (OUTPATIENT)
Dept: NEUROLOGY | Facility: CLINIC | Age: 30
End: 2025-04-23
Payer: COMMERCIAL

## 2025-04-23 DIAGNOSIS — G43.019 INTRACTABLE MIGRAINE WITHOUT AURA AND WITHOUT STATUS MIGRAINOSUS: ICD-10-CM

## 2025-04-23 DIAGNOSIS — G43.709 CHRONIC MIGRAINE WITHOUT AURA WITHOUT STATUS MIGRAINOSUS, NOT INTRACTABLE: Primary | ICD-10-CM

## 2025-04-23 DIAGNOSIS — G43.709 CHRONIC MIGRAINE WITHOUT AURA WITHOUT STATUS MIGRAINOSUS, NOT INTRACTABLE: ICD-10-CM

## 2025-04-23 DIAGNOSIS — G43.009 MIGRAINE WITHOUT AURA AND WITHOUT STATUS MIGRAINOSUS, NOT INTRACTABLE: Primary | ICD-10-CM

## 2025-04-23 NOTE — TELEPHONE ENCOUNTER
Prior Authorization Retail Medication Request    Medication/Dose: Nurtec 75 mg  Diagnosis and ICD code (if different than what is on RX):    New/renewal/insurance change PA/secondary ins. PA:  Previously Tried and Failed:   naratriptan, sumatriptan and zolmitriptans, Hydroxyzine    Naproxen  Ibuprofen  Zofran  Rizatriptan     Rationale: acute migraine     Insurance NAME:  [facility] [employer]   Insurance ID [id number]

## 2025-04-27 NOTE — TELEPHONE ENCOUNTER
Central Prior Authorization Team   Phone: [phone]    PA Initiation    Medication: Nurtec 75 mg  Insurance Company: [email or web address] - Phone [phone] Fax [phone]  Pharmacy Filling the Rx: [id number] [employer] [facility] [place] - [address]  Filling Pharmacy Phone: [phone]  Filling Pharmacy Fax:    Start Date: 4/27/2025

## 2025-04-29 NOTE — TELEPHONE ENCOUNTER
PRIOR AUTHORIZATION DENIED    Medication: Nurtec 75 mg-PA DENIED     Denial Date: 4/28/2025    Denial Rational:           Appeal Information:

## 2025-04-30 NOTE — TELEPHONE ENCOUNTER
Medication Appeal Initiation    We have initiated an appeal for the requested medication:  Medication: Nurtec 75 mg-PA DENIED, INITIATE PA APPEAL   Appeal Start Date:  4/30/2025  Insurance Company:  "[employer] PRESCRIPTION CLAIM APPEALS (FAX) [phone]  Comments:         Initiate PA Appeal via fax [phone] with Letter of Medical Necessity (dated 4/30/2025) attached with Request. Awaiting on a response on Appeal.

## 2025-05-12 DIAGNOSIS — G43.709 CHRONIC MIGRAINE WITHOUT AURA WITHOUT STATUS MIGRAINOSUS, NOT INTRACTABLE: Primary | ICD-10-CM

## 2025-05-15 SDOH — HEALTH STABILITY: PHYSICAL HEALTH: ON AVERAGE, HOW MANY DAYS PER WEEK DO YOU ENGAGE IN MODERATE TO STRENUOUS EXERCISE (LIKE A BRISK WALK)?: 4 DAYS

## 2025-05-15 SDOH — HEALTH STABILITY: PHYSICAL HEALTH: ON AVERAGE, HOW MANY MINUTES DO YOU ENGAGE IN EXERCISE AT THIS LEVEL?: 30 MIN

## 2025-05-15 ASSESSMENT — SOCIAL DETERMINANTS OF HEALTH (SDOH): HOW OFTEN DO YOU GET TOGETHER WITH FRIENDS OR RELATIVES?: ONCE A WEEK

## 2025-05-20 ENCOUNTER — OFFICE VISIT (OUTPATIENT)
Dept: FAMILY MEDICINE | Facility: CLINIC | Age: 30
End: 2025-05-20
Payer: COMMERCIAL

## 2025-05-20 ENCOUNTER — OFFICE VISIT (OUTPATIENT)
Dept: NEUROLOGY | Facility: CLINIC | Age: 30
End: 2025-05-20
Payer: COMMERCIAL

## 2025-05-20 ENCOUNTER — MYC MEDICAL ADVICE (OUTPATIENT)
Dept: FAMILY MEDICINE | Facility: CLINIC | Age: 30
End: 2025-05-20

## 2025-05-20 VITALS
HEART RATE: 85 BPM | RESPIRATION RATE: 16 BRPM | BODY MASS INDEX: 27.78 KG/M2 | TEMPERATURE: 97.1 F | DIASTOLIC BLOOD PRESSURE: 72 MMHG | HEIGHT: 67 IN | SYSTOLIC BLOOD PRESSURE: 100 MMHG | WEIGHT: 177 LBS | OXYGEN SATURATION: 99 %

## 2025-05-20 DIAGNOSIS — N76.0 BV (BACTERIAL VAGINOSIS): Primary | ICD-10-CM

## 2025-05-20 DIAGNOSIS — F41.1 GAD (GENERALIZED ANXIETY DISORDER): ICD-10-CM

## 2025-05-20 DIAGNOSIS — Z00.00 ROUTINE GENERAL MEDICAL EXAMINATION AT A HEALTH CARE FACILITY: Primary | ICD-10-CM

## 2025-05-20 DIAGNOSIS — N89.8 VAGINAL DISCHARGE: ICD-10-CM

## 2025-05-20 DIAGNOSIS — B96.89 BV (BACTERIAL VAGINOSIS): Primary | ICD-10-CM

## 2025-05-20 DIAGNOSIS — R53.83 OTHER FATIGUE: ICD-10-CM

## 2025-05-20 DIAGNOSIS — G43.709 CHRONIC MIGRAINE WITHOUT AURA WITHOUT STATUS MIGRAINOSUS, NOT INTRACTABLE: Primary | ICD-10-CM

## 2025-05-20 DIAGNOSIS — E78.00 ELEVATED LDL CHOLESTEROL LEVEL: ICD-10-CM

## 2025-05-20 LAB
CHOLEST SERPL-MCNC: 218 MG/DL
CLUE CELLS: PRESENT
FASTING STATUS PATIENT QL REPORTED: NO
HDLC SERPL-MCNC: 68 MG/DL
LDLC SERPL CALC-MCNC: 123 MG/DL
NONHDLC SERPL-MCNC: 150 MG/DL
TRICHOMONAS, WET PREP: ABNORMAL
TRIGL SERPL-MCNC: 133 MG/DL
WBC'S/HIGH POWER FIELD, WET PREP: ABNORMAL
YEAST, WET PREP: ABNORMAL

## 2025-05-20 PROCEDURE — 99213 OFFICE O/P EST LOW 20 MIN: CPT | Mod: 25 | Performed by: PHYSICIAN ASSISTANT

## 2025-05-20 PROCEDURE — 36415 COLL VENOUS BLD VENIPUNCTURE: CPT | Performed by: PHYSICIAN ASSISTANT

## 2025-05-20 PROCEDURE — 87210 SMEAR WET MOUNT SALINE/INK: CPT | Performed by: PHYSICIAN ASSISTANT

## 2025-05-20 PROCEDURE — 99395 PREV VISIT EST AGE 18-39: CPT | Performed by: PHYSICIAN ASSISTANT

## 2025-05-20 PROCEDURE — 80061 LIPID PANEL: CPT | Performed by: PHYSICIAN ASSISTANT

## 2025-05-20 PROCEDURE — 64615 CHEMODENERV MUSC MIGRAINE: CPT | Performed by: NURSE PRACTITIONER

## 2025-05-20 RX ORDER — METRONIDAZOLE 500 MG/1
500 TABLET ORAL 2 TIMES DAILY
Qty: 14 TABLET | Refills: 0 | Status: SHIPPED | OUTPATIENT
Start: 2025-05-20 | End: 2025-05-27

## 2025-05-20 ASSESSMENT — HEADACHE IMPACT TEST (HIT 6)
HIT6 TOTAL SCORE: 66
HOW OFTEN HAVE YOU FELT TOO TIRED TO WORK BECAUSE OF YOUR HEADACHES: VERY OFTEN
WHEN YOU HAVE A HEADACHE HOW OFTEN DO YOU WISH YOU COULD LIE DOWN: VERY OFTEN
HOW OFTEN DID HEADACHS LIMIT CONCENTRATION ON WORK OR DAILY ACTIVITY: VERY OFTEN
HOW OFTEN HAVE YOU FELT FED UP OR IRRITATED BECAUSE OF YOUR HEADACHES: VERY OFTEN
WHEN YOU HAVE HEADACHES HOW OFTEN IS THE PAIN SEVERE: VERY OFTEN
HOW OFTEN DO HEADACHES LIMIT YOUR DAILY ACTIVITIES: VERY OFTEN

## 2025-05-20 ASSESSMENT — PAIN SCALES - GENERAL: PAINLEVEL_OUTOF10: NO PAIN (0)

## 2025-05-20 NOTE — PROGRESS NOTES
Preventive Care Visit  [facility] MÓNICA Baca PA-C, Family Medicine  May 20, 2025      1. Routine general medical examination at a health care facility (Primary)  - Lipid Profile (Chol, Trig, HDL, LDL calc); Future  - Lipid Profile (Chol, Trig, HDL, LDL calc)    2. SCOTT (generalized anxiety disorder)  -Patient is tolerating 60mg Prozac dose well and would like to continue current dose.     3. Vaginal discharge  - Wet prep - Clinic Collect     Subjective   Joaquina is a   29 year old, presenting for the following:  Physical    Patient gave birth to her first child in 2025. She has stopped breastfeeding for the past month but has not yet noticed a return of her period. She has also noticed for the past month non-odorous white vaginal discharge, vaginal itchiness and dyspareunia. She denies vaginal bleeding, pelvic pain, dysuria, urinary frequency or incomplete emptying.     Patient's Prozac dose was increased to 60mg 3 months due to anxiety around childbirth but she feels it is helpful for her anxiety and would like to remain on this dose. Denies chest pain, shortness of breath, constipation, diarrhea, breast concerns or recent illness.           2025     9:13 AM   Additional Questions   Roomed by Jennifer          HPI    Advance Care Planning          5/15/2025   General Health   How would you rate your overall physical health? (!) FAIR   Feel stress (tense, anxious, or unable to sleep) Rather much   (!) STRESS CONCERN      5/15/2025   Nutrition   Three or more servings of calcium each day? Yes   Diet: Regular (no restrictions)   How many servings of fruit and vegetables per day? (!) 2-3   How many sweetened beverages each day? 0-1         5/15/2025   Exercise   Days per week of moderate/strenous exercise 4 days   Average minutes spent exercising at this level 30 min         5/15/2025   Social Factors   Frequency of gathering with friends or relatives Once a week    Worry food won't last until get money to buy more No   Food not last or not have enough money for food? No   Do you have housing? (Housing is defined as stable permanent housing and does not include staying outside in a car, in a tent, in an abandoned building, in an overnight shelter, or couch-surfing.) Yes   Are you worried about losing your housing? No   Lack of transportation? No   Unable to get utilities (heat,electricity)? No         5/15/2025   Dental   Dentist two times every year? Yes           Today's PHQ-2 Score:       3/27/2025     7:04 AM   PHQ-2 ( 1999 [employer])   Q1: Little interest or pleasure in doing things 0   Q2: Feeling down, depressed or hopeless 0   PHQ-2 Score 0         5/15/2025   Substance Use   Alcohol more than 3/day or more than 7/wk Not Applicable   Do you use any other substances recreationally? No     Social History     Tobacco Use    Smoking status: Never     Passive exposure: Never    Smokeless tobacco: Never   Vaping Use    Vaping status: Never Used   Substance Use Topics    Alcohol use: Not Currently    Drug use: No           6/21/2021   LAST FHS-7 RESULTS   1st degree relative breast or ovarian cancer No   Any relative bilateral breast cancer No   Any male have breast cancer No   Any ONE woman have BOTH breast AND ovarian cancer Yes   Any woman with breast cancer before 50yrs Unknown   2 or more relatives with breast AND/OR ovarian cancer Unknown   2 or more relatives with breast AND/OR bowel cancer Unknown     Mammogram Screening - Patient under 40 years of age: Routine Mammogram Screening not recommended.         5/15/2025   STI Screening   New sexual partner(s) since last STI/HIV test? No     History of abnormal Pap smear: No - age 30-64 HPV with reflex Pap every 5 years recommended        Latest Ref Rng & Units 3/21/2025     1:24 PM 9/22/2023     7:30 AM 10/30/2020     9:54 AM   PAP / HPV   PAP  Negative for Intraepithelial Lesion or Malignancy (NILM)  Negative for  Intraepithelial Lesion or Malignancy (NILM)     PAP (Historical)    NIL    HPV 16 DNA Negative Negative  Negative     HPV 18 DNA Negative Negative  Negative     Other HR HPV Negative Negative  Positive             5/15/2025   Contraception/Family Planning   Questions about contraception or family planning No     Reviewed and updated as needed this visit by Provider                    Past Medical History:   Diagnosis Date    Abnormal Pap smear of cervix 02/01/2020 9/2016 NIL pap per pt report 9/25/18 NIL pap 2/2020 Abnormal pap per pt report. Unknown result. 10/30/20 NIL pap. Plan: await provider    Anxiety      Past Surgical History:   Procedure Laterality Date    COLONOSCOPY      ESOPHAGOSCOPY, GASTROSCOPY, DUODENOSCOPY (EGD), COMBINED N/A 9/7/2017    Procedure: COMBINED ESOPHAGOSCOPY, GASTROSCOPY, DUODENOSCOPY (EGD), BIOPSY SINGLE OR MULTIPLE;  gastroscopy;  Surgeon: Hollis Prather MD;  [facility]: [facility]     Current Outpatient Medications   Medication Sig Dispense Refill    acetaminophen (TYLENOL) 325 MG tablet Take 2 tablets (650 mg) by mouth every 4 hours as needed for mild pain or fever (greater than or equal to 38 C /100.4 F (oral) or 38.5 C/ 101.4 F (core).).      docusate sodium (COLACE) 100 MG capsule Take 1 capsule (100 mg) by mouth daily.      FLUoxetine (PROZAC) 20 MG capsule Take 20 mg along with 40 mg ( TDD 60 mg daily) 90 capsule 1    FLUoxetine (PROZAC) 40 MG capsule TAKE 1 CAPSULE (40 MG) BY MOUTH DAILY. WITH A 20 MG CAPSULE DAILY FOR TOTAL OF 60 MG DAILY 90 capsule 0    hydrOXYzine HCl (ATARAX) 25 MG tablet TAKE 1 TABLET (25 MG) BY MOUTH AS NEEDED FOR ITCHING (AS NEEDED FOR ITCHING.) 90 tablet 1    ibuprofen (ADVIL/MOTRIN) 800 MG tablet Take 1 tablet (800 mg) by mouth every 6 hours as needed for other (cramping).      metoclopramide (REGLAN) 5 MG tablet Take 1 tablet (5 mg) by mouth every 6 hours as needed for vomiting. 20 tablet 1    pantoprazole (PROTONIX) 20 MG EC tablet Take 1 tablet  "by mouth daily at 2 pm.      Prenatal Vit-Fe Fumarate-FA (PRENATAL MULTIVITAMIN  PLUS IRON) 27-1 MG TABS Take by mouth daily.      rimegepant (NURTEC) 75 MG ODT tablet Take 1 tablet (75 mg) by mouth See Admin Instructions. At onset of headache, place 1 tablet (75 mg) on or under the tongue. Maximum of 1 tablet every 24 hours. 8 tablet 9    SUMAtriptan (IMITREX) 50 MG tablet Take 1 tablet (50 mg) by mouth at onset of headache for migraine. May repeat in 2 hours. Max 4 tablets/24 hours. 12 tablet 11    Vitamin D3 (CHOLECALCIFEROL) 125 MCG (5000 UT) tablet Take 1 tablet by mouth daily.       Allergies   Allergen Reactions    No Clinical Screening - See Comments      PN: LW CM1: CONTRAST- nka Reaction :    Sulfamethoxazole-Trimethoprim Rash and Hives     Other reaction(s): Unknown/Not Verified         Review of Systems  Constitutional, neuro, ENT, endocrine, pulmonary, cardiac, gastrointestinal, genitourinary, musculoskeletal, integument and psychiatric systems are negative, except as otherwise noted.     Objective    Exam  /72   Pulse 85   Temp 97.1  F (36.2  C) (Temporal)   Resp 16   Ht 1.695 m (5' 6.73\")   Wt 80.3 kg (177 lb)   SpO2 99%   Breastfeeding No   BMI 27.95 kg/m     Estimated body mass index is 27.95 kg/m  as calculated from the following:    Height as of this encounter: 1.695 m (5' 6.73\").    Weight as of this encounter: 80.3 kg (177 lb).    Physical Exam  GENERAL: alert and no distress  EYES: Eyes grossly normal to inspection, PERRL and conjunctivae and sclerae normal  HENT: ear canals and TM's normal, nose and mouth without ulcers or lesions  NECK: no adenopathy, no asymmetry, masses, or scars  RESP: lungs clear to auscultation - no rales, rhonchi or wheezes  BREAST: normal without masses, tenderness or nipple discharge and no palpable axillary masses or adenopathy  CV: regular rate and rhythm, normal S1 S2, no S3 or S4, no murmur, click or rub, no peripheral edema  ABDOMEN: soft, " nontender, no hepatosplenomegaly, no masses and bowel sounds normal   (female) w/bimanual: White vaginal discharge noted on speculum exam. Normal female external genitalia, normal urethral meatus, normal vaginal mucosa, and normal cervix/adnexa/uterus without masses or discharge  MS: no gross musculoskeletal defects noted, no edema  SKIN: no suspicious lesions or rashes  NEURO: Normal strength and tone, mentation intact and speech normal  PSYCH: mentation appears normal, affect normal/bright    Signed Electronically by: Yanick Baca PA-C

## 2025-05-20 NOTE — LETTER
5/20/2025       RE: Ary Lutz  [address] [address]     Dear Colleague,    Thank you for referring your patient, Ary Lutz, to the [facility] [place] [facility]. Please see a copy of my visit note below.    PROCEDURE NOTE:     [facility]  Botulinum Toxin Procedure     Headache Neurology     05/20/25    Procedure:  OnabotulinumtoxinA injections for chronic migraine  Indication:  Chronic migraine     Joaquina suffers from severe intractable headaches.  She was referred by for onabotulinumtoxinA injections for headache.  Risks, benefits, and alternatives were discussed.  All questions were answered and consent given.  She decided to proceed with the injections.      Headache history, from initial consult note: 5/25/21 initial visit      Prior to initiation of botulinum toxin injections, Ms. Lutz reported 16 severe migraine headache days per month,  Her headaches are quite disabling and often interfere with her ability to function normally.     Date of last injections:   04/15/24-stopped due to pregnancy     Ms. Lutz reports 16 severe migraine headache days per month.  She has noticed a wearing off phenomenon prior to this round of botulinum toxin injections, lasting 0 weeks.     Botulinum toxin injections have improved their functioning. Able to function better -social and work as headaches controlled     She has attempted other migraine prophylactic treatments in the past, which have included:  : verapamil, topiramate, fluoxetine, gabapentin.  Beta blocker has been avoided due to depression hx.       She currently takes  for headache prevention. fluoxetine, tizanidine, rimegepant     Ms. Roses pain was assessed prior to the procedure.  She rated her pain today as 0 out of 10.     Procedural Pause: Procedural pause was conducted to verify correct patient identity, procedure to be performed,  correct side and site, correct patient position, and special requirements. Appropriate hand hygiene was utilized, and each injection site was prepped with alcohol wipe or Chloraprep swab.      Procedure Details: 200 units of onabotulinumtoxinA was diluted in 4 mL 0.9% normal saline. A total of 155 units of onabotulinumtoxinA were injected using 30 gauge 0.5 in needles into the muscles listed below. 45 units of onabotulinumtoxinA were wasted.         GOAL OF PROCEDURE:  The goal of this procedure is to decrease pain and enhance functional independence.        CONSENT:  The risks, benefits, and treatment options were discussed with the patient who agreed to proceed.     Written consent was obtained      EQUIPMENT USED:  Needles-30 gauge, 0.5 inches for injections  Four 1-ml tuberculin syringes for injections  One sodium chloride 10 ml vial preservative free  Alcohol swabs     SKIN PREPARATION:  Skin preparation was performed using an alcohol wipe.        AREA/MUSCLE INJECTED:  155 units of Botox  Right upper Trapezius (upper cervical) - 5 units of Botox at 3 site/s.   Left upper Trapezius (upper cervical) - 5 units of Botox at 3 site/s.      Right cervical paraspinals - 5 units of Botox at 2 site/s.   Left cervical paraspinals - 5 units of Botox at 2 site/s.      Left occipitalis - 5 units of Botox at 3 site/s.  Right occipitalis -5 units of Botox at 3 site/s     Right Frontalis - 5 units of Botox at 2 site/s.  Left Frontalis - 5 units of Botox at 2 site/s.     Right Temporalis - 5 units of Botox at 4 site/s.  Left Temporalis - 5 units of Botox at 4 site/s.     Right  - 5 units of Botox at 1 site/s.              Left  - 5 units of Botox at 1 site/s.     Procerus - 5 units of Botox at 1 site/s.     RESPONSE TO PROCEDURE:  tolerated the procedure well and there were no immediate complications.  Patient was allowed to recover for an appropriate period of time and was discharged home in stable  condition.     FOLLOW UP:     Repeat Botox injections in 12 weeks        This procedure was performed under a hospital privileging agreement with ROBE Stevenson, CNP  [facility]       Again, thank you for allowing me to participate in the care of your patient.      Sincerely,    ROBE Vargas CNP

## 2025-05-20 NOTE — PROGRESS NOTES
PROCEDURE NOTE:     [facility]  Botulinum Toxin Procedure     Headache Neurology     05/20/25    Procedure:  OnabotulinumtoxinA injections for chronic migraine  Indication:  Chronic migraine     Joaquina suffers from severe intractable headaches.  She was referred by for onabotulinumtoxinA injections for headache.  Risks, benefits, and alternatives were discussed.  All questions were answered and consent given.  She decided to proceed with the injections.      Headache history, from initial consult note: 5/25/21 initial visit      Prior to initiation of botulinum toxin injections, Ms. Lutz reported 16 severe migraine headache days per month,  Her headaches are quite disabling and often interfere with her ability to function normally.     Date of last injections:   04/15/24-stopped due to pregnancy     Ms. Lutz reports 16 severe migraine headache days per month.  She has noticed a wearing off phenomenon prior to this round of botulinum toxin injections, lasting 0 weeks.     Botulinum toxin injections have improved their functioning. Able to function better -social and work as headaches controlled     She has attempted other migraine prophylactic treatments in the past, which have included:  : verapamil, topiramate, fluoxetine, gabapentin.  Beta blocker has been avoided due to depression hx.       She currently takes  for headache prevention. fluoxetine, tizanidine, rimegepant     Ms. Roses pain was assessed prior to the procedure.  She rated her pain today as 0 out of 10.     Procedural Pause: Procedural pause was conducted to verify correct patient identity, procedure to be performed, correct side and site, correct patient position, and special requirements. Appropriate hand hygiene was utilized, and each injection site was prepped with alcohol wipe or Chloraprep swab.      Procedure Details: 200 units of onabotulinumtoxinA was diluted in 4 mL 0.9% normal saline. A total of 155 units of  onabotulinumtoxinA were injected using 30 gauge 0.5 in needles into the muscles listed below. 45 units of onabotulinumtoxinA were wasted.         GOAL OF PROCEDURE:  The goal of this procedure is to decrease pain and enhance functional independence.        CONSENT:  The risks, benefits, and treatment options were discussed with the patient who agreed to proceed.     Written consent was obtained      EQUIPMENT USED:  Needles-30 gauge, 0.5 inches for injections  Four 1-ml tuberculin syringes for injections  One sodium chloride 10 ml vial preservative free  Alcohol swabs     SKIN PREPARATION:  Skin preparation was performed using an alcohol wipe.        AREA/MUSCLE INJECTED:  155 units of Botox  Right upper Trapezius (upper cervical) - 5 units of Botox at 3 site/s.   Left upper Trapezius (upper cervical) - 5 units of Botox at 3 site/s.      Right cervical paraspinals - 5 units of Botox at 2 site/s.   Left cervical paraspinals - 5 units of Botox at 2 site/s.      Left occipitalis - 5 units of Botox at 3 site/s.  Right occipitalis -5 units of Botox at 3 site/s     Right Frontalis - 5 units of Botox at 2 site/s.  Left Frontalis - 5 units of Botox at 2 site/s.     Right Temporalis - 5 units of Botox at 4 site/s.  Left Temporalis - 5 units of Botox at 4 site/s.     Right  - 5 units of Botox at 1 site/s.              Left  - 5 units of Botox at 1 site/s.     Procerus - 5 units of Botox at 1 site/s.     RESPONSE TO PROCEDURE:  tolerated the procedure well and there were no immediate complications.  Patient was allowed to recover for an appropriate period of time and was discharged home in stable condition.     FOLLOW UP:     Repeat Botox injections in 12 weeks        This procedure was performed under a hospital privileging agreement with Dr. Sarabjit Shea, APRN, [facility]

## 2025-05-24 ENCOUNTER — LAB (OUTPATIENT)
Dept: LAB | Facility: CLINIC | Age: 30
End: 2025-05-24
Payer: COMMERCIAL

## 2025-05-24 DIAGNOSIS — E78.00 ELEVATED LDL CHOLESTEROL LEVEL: ICD-10-CM

## 2025-05-24 DIAGNOSIS — R53.83 OTHER FATIGUE: ICD-10-CM

## 2025-05-24 LAB
ALBUMIN SERPL BCG-MCNC: 4.2 G/DL (ref 3.5–5.2)
ALP SERPL-CCNC: 101 U/L (ref 40–150)
ALT SERPL W P-5'-P-CCNC: 22 U/L (ref 0–50)
ANION GAP SERPL CALCULATED.3IONS-SCNC: 8 MMOL/L (ref 7–15)
AST SERPL W P-5'-P-CCNC: 24 U/L (ref 0–45)
BASOPHILS # BLD AUTO: 0 10E3/UL (ref 0–0.2)
BASOPHILS NFR BLD AUTO: 0 %
BILIRUB SERPL-MCNC: 0.4 MG/DL
BUN SERPL-MCNC: 17 MG/DL (ref 6–20)
CALCIUM SERPL-MCNC: 9.2 MG/DL (ref 8.8–10.4)
CHLORIDE SERPL-SCNC: 104 MMOL/L (ref 98–107)
CHOLEST SERPL-MCNC: 191 MG/DL
CREAT SERPL-MCNC: 0.75 MG/DL (ref 0.51–0.95)
EGFRCR SERPLBLD CKD-EPI 2021: >90 ML/MIN/1.73M2
EOSINOPHIL # BLD AUTO: 0.1 10E3/UL (ref 0–0.7)
EOSINOPHIL NFR BLD AUTO: 2 %
ERYTHROCYTE [DISTWIDTH] IN BLOOD BY AUTOMATED COUNT: 14 % (ref 10–15)
FASTING STATUS PATIENT QL REPORTED: YES
FASTING STATUS PATIENT QL REPORTED: YES
FERRITIN SERPL-MCNC: 23 NG/ML (ref 6–175)
GLUCOSE SERPL-MCNC: 85 MG/DL (ref 70–99)
HCO3 SERPL-SCNC: 27 MMOL/L (ref 22–29)
HCT VFR BLD AUTO: 40.3 % (ref 35–47)
HDLC SERPL-MCNC: 76 MG/DL
HGB BLD-MCNC: 13.5 G/DL (ref 11.7–15.7)
IMM GRANULOCYTES # BLD: 0 10E3/UL
IMM GRANULOCYTES NFR BLD: 0 %
IRON BINDING CAPACITY (ROCHE): 309 UG/DL (ref 240–430)
IRON SATN MFR SERPL: 13 % (ref 15–46)
IRON SERPL-MCNC: 40 UG/DL (ref 37–145)
LDLC SERPL CALC-MCNC: 104 MG/DL
LYMPHOCYTES # BLD AUTO: 2.1 10E3/UL (ref 0.8–5.3)
LYMPHOCYTES NFR BLD AUTO: 35 %
MCH RBC QN AUTO: 25.9 PG (ref 26.5–33)
MCHC RBC AUTO-ENTMCNC: 33.5 G/DL (ref 31.5–36.5)
MCV RBC AUTO: 77 FL (ref 78–100)
MONOCYTES # BLD AUTO: 0.4 10E3/UL (ref 0–1.3)
MONOCYTES NFR BLD AUTO: 7 %
NEUTROPHILS # BLD AUTO: 3.3 10E3/UL (ref 1.6–8.3)
NEUTROPHILS NFR BLD AUTO: 56 %
NONHDLC SERPL-MCNC: 115 MG/DL
PLATELET # BLD AUTO: 212 10E3/UL (ref 150–450)
POTASSIUM SERPL-SCNC: 4.6 MMOL/L (ref 3.4–5.3)
PROT SERPL-MCNC: 7.1 G/DL (ref 6.4–8.3)
RBC # BLD AUTO: 5.22 10E6/UL (ref 3.8–5.2)
SODIUM SERPL-SCNC: 139 MMOL/L (ref 135–145)
TRIGL SERPL-MCNC: 55 MG/DL
TSH SERPL DL<=0.005 MIU/L-ACNC: 1.26 UIU/ML (ref 0.3–4.2)
WBC # BLD AUTO: 5.9 10E3/UL (ref 4–11)

## 2025-05-24 PROCEDURE — 83550 IRON BINDING TEST: CPT

## 2025-05-24 PROCEDURE — 83540 ASSAY OF IRON: CPT

## 2025-05-24 PROCEDURE — 80061 LIPID PANEL: CPT

## 2025-05-24 PROCEDURE — 84443 ASSAY THYROID STIM HORMONE: CPT

## 2025-05-24 PROCEDURE — 80053 COMPREHEN METABOLIC PANEL: CPT

## 2025-05-24 PROCEDURE — 85025 COMPLETE CBC W/AUTO DIFF WBC: CPT

## 2025-05-24 PROCEDURE — 82728 ASSAY OF FERRITIN: CPT

## 2025-05-24 PROCEDURE — 36415 COLL VENOUS BLD VENIPUNCTURE: CPT

## 2025-05-26 ENCOUNTER — RESULTS FOLLOW-UP (OUTPATIENT)
Dept: FAMILY MEDICINE | Facility: CLINIC | Age: 30
End: 2025-05-26

## 2025-05-27 NOTE — RESULT ENCOUNTER NOTE
Ary-  Here are your recent results.     Your cholesterol is improved and LDL is just over 100.  Eating a healthy diet and getting regular exercise can improve this.  We can recheck this in 1 year    Your hemoglobin is normal and our ferritin ( iron storage) is normal.  Your blood cells are slightly small in size which is likely due to a resolving anemia from your recent delivery.  You can continue to take your prenatal vitamin and we can recheck this in 3 months.       The remainder of your labs are normal      Yanick Baca

## 2025-06-08 DIAGNOSIS — F41.1 GAD (GENERALIZED ANXIETY DISORDER): ICD-10-CM

## 2025-06-09 DIAGNOSIS — R26.89 BALANCE PROBLEMS: Primary | ICD-10-CM

## 2025-06-09 RX ORDER — FLUOXETINE HYDROCHLORIDE 40 MG/1
40 CAPSULE ORAL DAILY
Qty: 90 CAPSULE | Refills: 0 | Status: SHIPPED | OUTPATIENT
Start: 2025-06-09

## 2025-06-18 ENCOUNTER — PATIENT OUTREACH (OUTPATIENT)
Dept: FAMILY MEDICINE | Facility: CLINIC | Age: 30
End: 2025-06-18
Payer: COMMERCIAL

## 2025-06-18 NOTE — TELEPHONE ENCOUNTER
3/21/25 NIL pap, neg HR HPV (outside record). Plan 1 year cotest (due 3/21/26)    Pt returned to [facility] after being seen at outside clinic. Resume tracking

## 2025-07-14 ENCOUNTER — E-VISIT (OUTPATIENT)
Dept: URGENT CARE | Facility: CLINIC | Age: 30
End: 2025-07-14
Payer: COMMERCIAL

## 2025-07-14 DIAGNOSIS — L30.9 DERMATITIS: Primary | ICD-10-CM

## 2025-07-14 PROCEDURE — 99207 PR NON-BILLABLE SERV PER CHARTING: CPT | Performed by: NURSE PRACTITIONER

## 2025-07-14 RX ORDER — TRIAMCINOLONE ACETONIDE 1 MG/G
OINTMENT TOPICAL 2 TIMES DAILY
Qty: 80 G | Refills: 1 | Status: SHIPPED | OUTPATIENT
Start: 2025-07-14

## 2025-07-15 ENCOUNTER — MYC MEDICAL ADVICE (OUTPATIENT)
Dept: FAMILY MEDICINE | Facility: CLINIC | Age: 30
End: 2025-07-15

## 2025-07-15 DIAGNOSIS — D48.5 NEOPLASM OF UNCERTAIN BEHAVIOR OF SKIN: Primary | ICD-10-CM

## 2025-07-15 NOTE — TELEPHONE ENCOUNTER
To Provider:    Patient is requesting biopsy of red bump on leg and would like to know if this is something that can be done in clinic. If not she would like a referral to dermatologist.

## 2025-07-16 ENCOUNTER — PATIENT OUTREACH (OUTPATIENT)
Dept: CARE COORDINATION | Facility: CLINIC | Age: 30
End: 2025-07-16
Payer: COMMERCIAL

## 2025-08-05 ENCOUNTER — OFFICE VISIT (OUTPATIENT)
Dept: FAMILY MEDICINE | Facility: CLINIC | Age: 30
End: 2025-08-05
Payer: COMMERCIAL

## 2025-08-05 VITALS
SYSTOLIC BLOOD PRESSURE: 104 MMHG | BODY MASS INDEX: 26.76 KG/M2 | OXYGEN SATURATION: 98 % | RESPIRATION RATE: 15 BRPM | HEIGHT: 68 IN | WEIGHT: 176.56 LBS | HEART RATE: 77 BPM | TEMPERATURE: 97.1 F | DIASTOLIC BLOOD PRESSURE: 70 MMHG

## 2025-08-05 DIAGNOSIS — H60.542 ECZEMA OF LEFT EXTERNAL EAR: Primary | ICD-10-CM

## 2025-08-05 PROCEDURE — 3078F DIAST BP <80 MM HG: CPT | Performed by: NURSE PRACTITIONER

## 2025-08-05 PROCEDURE — 3074F SYST BP LT 130 MM HG: CPT | Performed by: NURSE PRACTITIONER

## 2025-08-05 PROCEDURE — 1125F AMNT PAIN NOTED PAIN PRSNT: CPT | Performed by: NURSE PRACTITIONER

## 2025-08-05 PROCEDURE — 99213 OFFICE O/P EST LOW 20 MIN: CPT | Performed by: NURSE PRACTITIONER

## 2025-08-05 RX ORDER — HYDROCORTISONE 25 MG/G
OINTMENT TOPICAL 2 TIMES DAILY
Qty: 30 G | Refills: 1 | Status: SHIPPED | OUTPATIENT
Start: 2025-08-05 | End: 2025-08-19

## 2025-08-05 ASSESSMENT — PAIN SCALES - GENERAL: PAINLEVEL_OUTOF10: MODERATE PAIN (5)

## 2025-08-12 ENCOUNTER — OFFICE VISIT (OUTPATIENT)
Dept: NEUROLOGY | Facility: CLINIC | Age: 30
End: 2025-08-12
Payer: COMMERCIAL

## 2025-08-12 DIAGNOSIS — G43.009 MIGRAINE WITHOUT AURA AND WITHOUT STATUS MIGRAINOSUS, NOT INTRACTABLE: ICD-10-CM

## 2025-08-12 DIAGNOSIS — G43.709 CHRONIC MIGRAINE WITHOUT AURA WITHOUT STATUS MIGRAINOSUS, NOT INTRACTABLE: Primary | ICD-10-CM

## 2025-08-12 PROCEDURE — 64615 CHEMODENERV MUSC MIGRAINE: CPT | Performed by: NURSE PRACTITIONER

## 2025-08-12 RX ORDER — NORETHINDRONE 0.35 MG/1
1 TABLET ORAL DAILY
COMMUNITY
Start: 2025-07-11

## 2025-08-12 ASSESSMENT — HEADACHE IMPACT TEST (HIT 6)
HIT6 TOTAL SCORE: 60
HOW OFTEN HAVE YOU FELT TOO TIRED TO WORK BECAUSE OF YOUR HEADACHES: SOMETIMES
WHEN YOU HAVE A HEADACHE HOW OFTEN DO YOU WISH YOU COULD LIE DOWN: SOMETIMES
HOW OFTEN DO HEADACHES LIMIT YOUR DAILY ACTIVITIES: SOMETIMES
HOW OFTEN DID HEADACHS LIMIT CONCENTRATION ON WORK OR DAILY ACTIVITY: SOMETIMES
HOW OFTEN HAVE YOU FELT FED UP OR IRRITATED BECAUSE OF YOUR HEADACHES: SOMETIMES
WHEN YOU HAVE HEADACHES HOW OFTEN IS THE PAIN SEVERE: SOMETIMES

## (undated) RX ORDER — FENTANYL CITRATE 50 UG/ML
INJECTION, SOLUTION INTRAMUSCULAR; INTRAVENOUS
Status: DISPENSED
Start: 2017-09-07